# Patient Record
Sex: FEMALE | Race: WHITE | Employment: FULL TIME | URBAN - METROPOLITAN AREA
[De-identification: names, ages, dates, MRNs, and addresses within clinical notes are randomized per-mention and may not be internally consistent; named-entity substitution may affect disease eponyms.]

---

## 2017-04-04 ENCOUNTER — EMPLOYEE WELLNESS (OUTPATIENT)
Dept: OTHER | Age: 52
End: 2017-04-04

## 2017-04-04 LAB
CHOLESTEROL, TOTAL: 145 MG/DL (ref 0–199)
GLUCOSE BLD-MCNC: 78 MG/DL (ref 70–99)
HDLC SERPL-MCNC: 26 MG/DL (ref 40–60)
LDL CHOLESTEROL CALCULATED: 93 MG/DL
TRIGL SERPL-MCNC: 131 MG/DL (ref 0–150)

## 2017-04-13 ENCOUNTER — OFFICE VISIT (OUTPATIENT)
Dept: DERMATOLOGY | Age: 52
End: 2017-04-13

## 2017-04-13 DIAGNOSIS — R20.9 DISTURBANCE OF SKIN SENSATION: ICD-10-CM

## 2017-04-13 DIAGNOSIS — D18.01 CHERRY ANGIOMA: ICD-10-CM

## 2017-04-13 DIAGNOSIS — L91.8 SKIN TAG: Primary | ICD-10-CM

## 2017-04-13 PROCEDURE — 99213 OFFICE O/P EST LOW 20 MIN: CPT | Performed by: DERMATOLOGY

## 2017-04-13 PROCEDURE — 11200 RMVL SKIN TAGS UP TO&INC 15: CPT | Performed by: DERMATOLOGY

## 2017-06-01 ENCOUNTER — PROCEDURE VISIT (OUTPATIENT)
Dept: DERMATOLOGY | Age: 52
End: 2017-06-01

## 2017-06-01 DIAGNOSIS — Z41.1 ELECTIVE PROCEDURE FOR UNACCEPTABLE COSMETIC APPEARANCE: ICD-10-CM

## 2017-06-01 DIAGNOSIS — D18.01 CHERRY ANGIOMA: Primary | ICD-10-CM

## 2017-06-01 PROCEDURE — DM01335 VBEAM LASER ANGIOMAS 1-5: Performed by: DERMATOLOGY

## 2017-07-07 ENCOUNTER — HOSPITAL ENCOUNTER (OUTPATIENT)
Dept: MAMMOGRAPHY | Age: 52
Discharge: HOME | End: 2017-07-07
Admitting: OBSTETRICS & GYNECOLOGY

## 2017-07-07 DIAGNOSIS — N63.10 BREAST MASS, RIGHT: ICD-10-CM

## 2017-07-07 DIAGNOSIS — N63.0 LUMP OR MASS IN BREAST: ICD-10-CM

## 2017-11-16 VITALS
HEIGHT: 66 IN | RESPIRATION RATE: 16 BRPM | OXYGEN SATURATION: 100 % | DIASTOLIC BLOOD PRESSURE: 66 MMHG | SYSTOLIC BLOOD PRESSURE: 111 MMHG | HEART RATE: 71 BPM | BODY MASS INDEX: 28.12 KG/M2 | TEMPERATURE: 97.9 F | WEIGHT: 175 LBS

## 2017-11-16 NOTE — ANESTHESIA PRE-OP
Department of Anesthesiology  Preprocedure Note       Name:  Lionel Lozano   Age:  50 y.o.  :  1965                                          MRN:  [id number]         Date:  2017      Surgeon: Greg Munroe    Procedure: colonoscopy    Medications prior to admission:   Prior to Admission medications    Medication Sig Start Date End Date Taking? Authorizing Provider   norethindrone-ethinyl estradiol-Fe (LO LOESTRIN FE) 1 MG-10 MCG / 10 MCG tablet Take 1 tablet by mouth daily   Yes Historical Provider, MD   fluticasone (VERAMYST) 27.5 MCG/SPRAY nasal spray 2 sprays by Nasal route daily   Yes Historical Provider, MD   Fexofenadine HCl (ALLEGRA PO) Take  by mouth daily. Yes Historical Provider, MD       Current medications:    Current Outpatient Prescriptions   Medication Sig Dispense Refill    norethindrone-ethinyl estradiol-Fe (LO LOESTRIN FE) 1 MG-10 MCG / 10 MCG tablet Take 1 tablet by mouth daily      fluticasone (VERAMYST) 27.5 MCG/SPRAY nasal spray 2 sprays by Nasal route daily      Fexofenadine HCl (ALLEGRA PO) Take  by mouth daily. No current facility-administered medications for this encounter. Allergies: Allergies   Allergen Reactions    Dye [Iodides]        Problem List:  There is no problem list on file for this patient. Past Medical History:        Diagnosis Date    Difficult intubation     states she's been told twice this.     Endometriosis     Fibroid tumor     Nausea & vomiting     TMJ disease        Past Surgical History:        Procedure Laterality Date     SECTION      CHOLECYSTECTOMY  2013    laparoscopic    DILATION AND CURETTAGE OF UTERUS         Social History:    Social History   Substance Use Topics    Smoking status: Never Smoker    Smokeless tobacco: Not on file    Alcohol use No                                Counseling given: Not Answered      Vital Signs (Current):   Vitals:    17 1030   BP: 111/66   Pulse: 71   Resp: 16   Temp:

## 2017-11-16 NOTE — OP NOTE
descending colon was removed  Recommendations  Patient was instructed to call for biopsy results in 1 week and follow up appropriately. The patient is recommended to have a follow-up colonoscopy in 3 years. Thank you and please let me know if there are any additional questions or concerns.     Leslye Hogan

## 2017-11-16 NOTE — PLAN OF CARE
ADMISSION PRE-PROCEDURE INTRA-PROCEDURE POST-PROCEDURE: RECOVERY/ DISCHARGE   ASSESSMENT &  EVALUATION CONSULTS [x] Verify patient identification, allergies, vital signs, NPO, IV, & SPO2  [x] Complete the AUGUSTUS SCORE  [x] Consent form to treat signed  [x] History and Physical [x] Reassess patient after pre- procedure medication given  [x] GI physician evaluates pt  [x] Verify patient's name, allergies [x] Continuous monitoring of vital  signs, SPO2, LOC  [x] Emotional status  [x] Patient comfort level [x] Total system admission assessment with appropriate intervention  [x] Pain evaluation and management  [x] Discharge criteria met  [x] Discharge assessment with appropriate intervention  [x] Compare with pre-procedure status  [x] Discharge by appropriate physician   DIAGNOSTIC / TESTS [x]  Lab work ordered  [x]  Obtain and attach lab work to patient's chart  [x]  Report abnormals and F/U with physician [x] Assure needed test results are present [x] Diagnostic testing as indicated  [x] Obtained specimens sent to lab [x] Diagnostic testing as indicated     MEDICATIONS [x]  Conscious sedation medications  explained to patient  [x]  Start IV per physician's order  and/or protocol  [x]  Verify compliance of the colon prep  []  Pre-procedure med. as ordered  [x] Verify compliance of colon prep. [x] Re-check IV access [x] Assist with administration of IV conscious sedation medication  [x] Start O2  per  nasal cannula, if needed   [x] IV fluids as indicated/ordered  [x] Administration of medications as ordered  [x] Medications as prescribed  [x] D/C O2 therapy as ordered   PROCEDURE/TREATMENT [x] Specific order by GI physician  [x] Specific procedure as scheduled  [x]  Verify procedure as ordered [x] Time out/procedure verification checklist complete.  [x] EGD/Colonoscopy and related procedures  [x] Assist physician with the procedure [x] Treatment as indicated   NUTRITION / DIET [x] NPO after midnight, as ordered [x] Verify NPO status [x] IV fluids as support [x] Clear liquids and/or ice chips as ordered  [x] Tolerating clear liquids  [x] Special diet as ordered  [x] D/C IV fluids   ACTIVITY  [x] Assess level of function  [x]  Specified by physician  [x]  Activity as tolerated [x] Position on left side [x] Position on left side and reposition patient as physician ordered [x] Gradually elevate HOB to omers position  [x] Position changes as patient tolerated  [x] Ambulate as pre-procedure   PATIENT / S.O. EDUCATION [x] Pre, Intra Post-procedure  teaching appropriate to procedure  [x] Conscious Sedation Teaching  [x] Pain Management - instructed [x] Encourage questions  [x] Clarify any concerns [x] Safety devices maintain to  prevent patient injury  [x] Assist and support patient  [x] Observe standard precautions [x] Physician confers with the family/S.O. [x] Short visit from family in [place] area  [x] Physician specific post-procedure orders  [x] S/S complications with proper [x]F/U; office visits F/U  [x] Review discharge instructions, medicine to family /S. O. [x] Medication/ special diet information given to family/ S.O. [x]  Copy of discharge instructions givn to family/S.O.   OUTCOME PLANNING  DISCHARGE PLAN [x] Patient/S. O. will verbalize understanding of admission procedure & expectations of outcome in realistic terms   [x] Patient verbalize the role of family/S. O. in plan of care  [x] Patient will have designated responsible person available for discharge.  [x] Patient will demonstrate an  understanding of the planned  procedure and its related procedures and conscious sedation [x] Patient will:  - receive services according to the           standards of care  - receive standards for conscious       sedation  - remain free of injury [x] Patient will:  - have stable vital signs based on Rosie Score   - be pain free or tolerable, have no bleeding  - have minimal abdominal distention   -  return to pre-procedure level of orientation   -  tolerate fluid with no nausea and vomiting  -  ambulate as pre-procedure ADL   - verbalize understanding of the discharge instructions     [address]

## 2017-11-16 NOTE — ANESTHESIA POST-OP
Anesthesia Post-op Note    Patient: Syd Thompson  MRN: [id number]  YOB: 1965  Date of evaluation: 11/16/2017  Time:  12:27 PM     Procedure(s) Performed:     Last Vitals: /66   Pulse 71   Temp 97.9 °F (36.6 °C)   Resp 16   Ht 5' 6\" (1.676 m)   Wt 175 lb (79.4 kg)   SpO2 100%   BMI 28.25 kg/m²     Rosie Phase I:      Rosie Phase II:      Anesthesia Post Evaluation    Final anesthesia type: MAC  Patient location during evaluation: PACU  Patient participation: complete - patient participated  Level of consciousness: awake and alert  Pain score: 0  Airway patency: patent  Nausea & Vomiting: no nausea and no vomiting  Complications: no  Cardiovascular status: blood pressure returned to baseline  Respiratory status: acceptable  Hydration status: euvolemic        Cielo Carballo MD  12:27 PM

## 2017-11-30 ENCOUNTER — HOSPITAL ENCOUNTER (OUTPATIENT)
Dept: MAMMOGRAPHY | Age: 52
Discharge: HOME | End: 2017-11-30
Attending: OBSTETRICS & GYNECOLOGY | Admitting: OBSTETRICS & GYNECOLOGY

## 2017-11-30 DIAGNOSIS — Z12.31 ENCOUNTER FOR SCREENING MAMMOGRAM FOR BREAST CANCER: ICD-10-CM

## 2017-12-19 ENCOUNTER — HOSPITAL ENCOUNTER (OUTPATIENT)
Dept: ENDOSCOPY | Age: 52
Discharge: HOME | End: 2017-11-16

## 2018-02-19 ENCOUNTER — HOSPITAL ENCOUNTER (OUTPATIENT)
Dept: OTHER | Age: 53
Discharge: HOME | End: 2018-02-19
Attending: OBSTETRICS & GYNECOLOGY | Admitting: OBSTETRICS & GYNECOLOGY

## 2018-02-19 LAB
BASOPHILS ABSOLUTE: 0.1 K/UL (ref 0–0.2)
BASOPHILS RELATIVE PERCENT: 0.7 %
EOSINOPHILS ABSOLUTE: 0.2 K/UL (ref 0–0.6)
EOSINOPHILS RELATIVE PERCENT: 2.8 %
FOLLICLE STIMULATING HORMONE: 3.5 MIU/ML
HCT VFR BLD CALC: 36.7 % (ref 36–48)
HEMOGLOBIN: 12.6 G/DL (ref 12–16)
LYMPHOCYTES ABSOLUTE: 2.1 K/UL (ref 1–5.1)
LYMPHOCYTES RELATIVE PERCENT: 26.2 %
MCH RBC QN AUTO: 31.6 PG (ref 26–34)
MCHC RBC AUTO-ENTMCNC: 34.4 G/DL (ref 31–36)
MCV RBC AUTO: 91.7 FL (ref 80–100)
MONOCYTES ABSOLUTE: 0.6 K/UL (ref 0–1.3)
MONOCYTES RELATIVE PERCENT: 7.2 %
NEUTROPHILS ABSOLUTE: 5 K/UL (ref 1.7–7.7)
NEUTROPHILS RELATIVE PERCENT: 63.1 %
PDW BLD-RTO: 13.9 % (ref 12.4–15.4)
PLATELET # BLD: 213 K/UL (ref 135–450)
PMV BLD AUTO: 10.5 FL (ref 5–10.5)
RBC # BLD: 4 M/UL (ref 4–5.2)
WBC # BLD: 8 K/UL (ref 4–11)

## 2018-02-23 ENCOUNTER — NURSE TRIAGE (OUTPATIENT)
Dept: OTHER | Facility: CLINIC | Age: 53
End: 2018-02-23

## 2018-02-23 PROBLEM — N93.8 DUB (DYSFUNCTIONAL UTERINE BLEEDING): Status: ACTIVE | Noted: 2018-02-23

## 2018-03-01 ENCOUNTER — HOSPITAL ENCOUNTER (OUTPATIENT)
Dept: MRI IMAGING | Age: 53
Discharge: HOME | End: 2018-03-01
Attending: OBSTETRICS & GYNECOLOGY | Admitting: OBSTETRICS & GYNECOLOGY

## 2018-03-01 ENCOUNTER — PAT TELEPHONE (OUTPATIENT)
Dept: PREADMISSION TESTING | Age: 53
End: 2018-03-01

## 2018-03-01 VITALS — HEIGHT: 66 IN | WEIGHT: 175 LBS | BODY MASS INDEX: 28.12 KG/M2

## 2018-03-01 DIAGNOSIS — N93.8 UTERINE BLEEDING, DYSFUNCTIONAL: ICD-10-CM

## 2018-03-01 DIAGNOSIS — D25.9 UTERINE LEIOMYOMA, UNSPECIFIED LOCATION: ICD-10-CM

## 2018-03-01 DIAGNOSIS — N39.0 URINARY TRACT INFECTION: ICD-10-CM

## 2018-03-05 PROBLEM — D25.9 FIBROID UTERUS: Status: ACTIVE | Noted: 2018-03-05

## 2018-03-06 PROBLEM — G89.18 POST-OP PAIN: Status: ACTIVE | Noted: 2018-03-06

## 2018-03-20 VITALS — WEIGHT: 174 LBS | BODY MASS INDEX: 27.25 KG/M2

## 2018-04-03 ENCOUNTER — EMPLOYEE WELLNESS (OUTPATIENT)
Dept: OTHER | Age: 53
End: 2018-04-03

## 2018-04-03 LAB
CHOLESTEROL, TOTAL: 191 MG/DL (ref 0–199)
GLUCOSE BLD-MCNC: 89 MG/DL (ref 70–99)
HDLC SERPL-MCNC: 36 MG/DL (ref 40–60)
LDL CHOLESTEROL CALCULATED: 127 MG/DL
TRIGL SERPL-MCNC: 141 MG/DL (ref 0–150)

## 2018-04-10 VITALS — BODY MASS INDEX: 27.92 KG/M2 | WEIGHT: 173 LBS

## 2018-08-28 ENCOUNTER — NURSE TRIAGE (OUTPATIENT)
Dept: OTHER | Facility: CLINIC | Age: 53
End: 2018-08-28

## 2018-08-28 ENCOUNTER — HOSPITAL ENCOUNTER (EMERGENCY)
Age: 53
Discharge: HOME | End: 2018-08-28
Attending: EMERGENCY MEDICINE
Payer: COMMERCIAL

## 2018-08-28 VITALS
BODY MASS INDEX: 28.12 KG/M2 | OXYGEN SATURATION: 99 % | SYSTOLIC BLOOD PRESSURE: 106 MMHG | WEIGHT: 175 LBS | HEART RATE: 62 BPM | TEMPERATURE: 98 F | DIASTOLIC BLOOD PRESSURE: 67 MMHG | HEIGHT: 66 IN | RESPIRATION RATE: 16 BRPM

## 2018-08-28 DIAGNOSIS — R42 VERTIGO: Primary | ICD-10-CM

## 2018-08-28 LAB
A/G RATIO: 1.5 (ref 1.1–2.2)
ALBUMIN SERPL-MCNC: 4.4 G/DL (ref 3.4–5)
ALP BLD-CCNC: 90 U/L (ref 40–129)
ALT SERPL-CCNC: 15 U/L (ref 10–40)
ANION GAP SERPL CALCULATED.3IONS-SCNC: 11 MMOL/L (ref 3–16)
AST SERPL-CCNC: 14 U/L (ref 15–37)
BASOPHILS ABSOLUTE: 0 K/UL (ref 0–0.2)
BASOPHILS RELATIVE PERCENT: 0.6 %
BILIRUB SERPL-MCNC: 0.4 MG/DL (ref 0–1)
BUN BLDV-MCNC: 15 MG/DL (ref 7–20)
CALCIUM SERPL-MCNC: 10.3 MG/DL (ref 8.3–10.6)
CHLORIDE BLD-SCNC: 107 MMOL/L (ref 99–110)
CO2: 26 MMOL/L (ref 21–32)
CREAT SERPL-MCNC: 0.7 MG/DL (ref 0.6–1.1)
EOSINOPHILS ABSOLUTE: 0.1 K/UL (ref 0–0.6)
EOSINOPHILS RELATIVE PERCENT: 2.3 %
GFR AFRICAN AMERICAN: >60
GFR NON-AFRICAN AMERICAN: >60
GLOBULIN: 2.9 G/DL
GLUCOSE BLD-MCNC: 102 MG/DL (ref 70–99)
HCT VFR BLD CALC: 41.5 % (ref 36–48)
HEMOGLOBIN: 13.6 G/DL (ref 12–16)
LYMPHOCYTES ABSOLUTE: 1.2 K/UL (ref 1–5.1)
LYMPHOCYTES RELATIVE PERCENT: 20.4 %
MCH RBC QN AUTO: 29.3 PG (ref 26–34)
MCHC RBC AUTO-ENTMCNC: 32.7 G/DL (ref 31–36)
MCV RBC AUTO: 89.7 FL (ref 80–100)
MONOCYTES ABSOLUTE: 0.4 K/UL (ref 0–1.3)
MONOCYTES RELATIVE PERCENT: 6.9 %
NEUTROPHILS ABSOLUTE: 4.2 K/UL (ref 1.7–7.7)
NEUTROPHILS RELATIVE PERCENT: 69.8 %
PDW BLD-RTO: 14.8 % (ref 12.4–15.4)
PLATELET # BLD: 206 K/UL (ref 135–450)
PMV BLD AUTO: 9.8 FL (ref 5–10.5)
POTASSIUM SERPL-SCNC: 4.7 MMOL/L (ref 3.5–5.1)
RBC # BLD: 4.63 M/UL (ref 4–5.2)
SODIUM BLD-SCNC: 144 MMOL/L (ref 136–145)
TOTAL PROTEIN: 7.3 G/DL (ref 6.4–8.2)
WBC # BLD: 5.9 K/UL (ref 4–11)

## 2018-08-28 PROCEDURE — 6370000000 HC RX 637 (ALT 250 FOR IP): Performed by: EMERGENCY MEDICINE

## 2018-08-28 PROCEDURE — 99284 EMERGENCY DEPT VISIT MOD MDM: CPT

## 2018-08-28 PROCEDURE — 96374 THER/PROPH/DIAG INJ IV PUSH: CPT

## 2018-08-28 PROCEDURE — 2580000003 HC RX 258: Performed by: EMERGENCY MEDICINE

## 2018-08-28 PROCEDURE — 96361 HYDRATE IV INFUSION ADD-ON: CPT

## 2018-08-28 PROCEDURE — 85025 COMPLETE CBC W/AUTO DIFF WBC: CPT

## 2018-08-28 PROCEDURE — 80053 COMPREHEN METABOLIC PANEL: CPT

## 2018-08-28 PROCEDURE — 36415 COLL VENOUS BLD VENIPUNCTURE: CPT

## 2018-08-28 PROCEDURE — 6360000002 HC RX W HCPCS: Performed by: EMERGENCY MEDICINE

## 2018-08-28 RX ORDER — MECLIZINE HYDROCHLORIDE 25 MG/1
25 TABLET ORAL 3 TIMES DAILY PRN
Qty: 20 TABLET | Refills: 0 | Status: SHIPPED | OUTPATIENT
Start: 2018-08-28 | End: 2020-10-13

## 2018-08-28 RX ORDER — ONDANSETRON 4 MG/1
4 TABLET, ORALLY DISINTEGRATING ORAL EVERY 8 HOURS PRN
Qty: 20 TABLET | Refills: 0 | Status: SHIPPED | OUTPATIENT
Start: 2018-08-28 | End: 2019-05-01 | Stop reason: ALTCHOICE

## 2018-08-28 RX ORDER — 0.9 % SODIUM CHLORIDE 0.9 %
1000 INTRAVENOUS SOLUTION INTRAVENOUS ONCE
Status: COMPLETED | OUTPATIENT
Start: 2018-08-28 | End: 2018-08-28

## 2018-08-28 RX ORDER — ONDANSETRON 2 MG/ML
4 INJECTION INTRAMUSCULAR; INTRAVENOUS
Status: DISCONTINUED | OUTPATIENT
Start: 2018-08-28 | End: 2018-08-28 | Stop reason: HOSPADM

## 2018-08-28 RX ORDER — MECLIZINE HYDROCHLORIDE 25 MG/1
25 TABLET ORAL ONCE
Status: COMPLETED | OUTPATIENT
Start: 2018-08-28 | End: 2018-08-28

## 2018-08-28 RX ADMIN — ONDANSETRON HYDROCHLORIDE 4 MG: 2 INJECTION, SOLUTION INTRAMUSCULAR; INTRAVENOUS at 08:19

## 2018-08-28 RX ADMIN — SODIUM CHLORIDE 1000 ML: 9 INJECTION, SOLUTION INTRAVENOUS at 08:19

## 2018-08-28 RX ADMIN — MECLIZINE HYDROCHLORIDE 25 MG: 25 TABLET ORAL at 08:19

## 2018-08-28 NOTE — LETTER
[facility] Emergency Department  [place] [address] [address]  Phone: [phone]               August 28, 2018    Patient: Stefanie Desai   YOB: 1965   Date of Visit: 8/28/2018       To Whom It May Concern:    Stefanie Desai was seen and treated in our emergency department on 8/28/2018. She was advised to avoid flying if she is still experiencing symptoms due to her medical condition diagnosed today.       Sincerely,       Tegan Good MD         Signature:__________________________________

## 2018-08-28 NOTE — TELEPHONE ENCOUNTER
Reason for Disposition   SEVERE dizziness (e.g., unable to stand, requires support to walk, feels like passing out now)    Protocols used: DIZZINESS-ADULT-OH    Caller states this morning she has severe dizziness and states the room was spinning including her blinds in her room were moving. Caller states she has nausea and has vomited with this and currently has severe dizziness where she is afraid to stand. Caller denies diabetes or any other chronic illness and has been eating, drinking and urinating normally. Caller states she called her mother who will take her to the [place] ER.

## 2018-12-04 ENCOUNTER — HOSPITAL ENCOUNTER (OUTPATIENT)
Dept: WOMENS IMAGING | Age: 53
Discharge: HOME | End: 2018-12-04
Payer: COMMERCIAL

## 2018-12-04 DIAGNOSIS — Z12.39 BREAST CANCER SCREENING: ICD-10-CM

## 2018-12-04 PROCEDURE — 77067 SCR MAMMO BI INCL CAD: CPT

## 2019-05-01 ENCOUNTER — OFFICE VISIT (OUTPATIENT)
Dept: FAMILY MEDICINE CLINIC | Age: 54
End: 2019-05-01
Payer: COMMERCIAL

## 2019-05-01 VITALS
OXYGEN SATURATION: 96 % | HEART RATE: 83 BPM | HEIGHT: 66 IN | SYSTOLIC BLOOD PRESSURE: 112 MMHG | TEMPERATURE: 98.1 F | DIASTOLIC BLOOD PRESSURE: 72 MMHG | WEIGHT: 190.4 LBS | BODY MASS INDEX: 30.6 KG/M2 | RESPIRATION RATE: 16 BRPM

## 2019-05-01 DIAGNOSIS — Z00.00 ANNUAL PHYSICAL EXAM: Primary | ICD-10-CM

## 2019-05-01 DIAGNOSIS — M62.89 TIGHTNESS OF LEG FASCIA: ICD-10-CM

## 2019-05-01 DIAGNOSIS — M25.511 CHRONIC RIGHT SHOULDER PAIN: ICD-10-CM

## 2019-05-01 DIAGNOSIS — G89.29 CHRONIC RIGHT SHOULDER PAIN: ICD-10-CM

## 2019-05-01 DIAGNOSIS — R63.5 UNEXPLAINED WEIGHT GAIN: Primary | ICD-10-CM

## 2019-05-01 PROCEDURE — 99204 OFFICE O/P NEW MOD 45 MIN: CPT | Performed by: FAMILY MEDICINE

## 2019-05-01 ASSESSMENT — PATIENT HEALTH QUESTIONNAIRE - PHQ9
SUM OF ALL RESPONSES TO PHQ9 QUESTIONS 1 & 2: 0
SUM OF ALL RESPONSES TO PHQ QUESTIONS 1-9: 0
1. LITTLE INTEREST OR PLEASURE IN DOING THINGS: 0
SUM OF ALL RESPONSES TO PHQ QUESTIONS 1-9: 0
2. FEELING DOWN, DEPRESSED OR HOPELESS: 0

## 2019-05-01 NOTE — PROGRESS NOTES
resource strain: Not on file    Food insecurity:     Worry: Not on file     Inability: Not on file    Transportation needs:     Medical: Not on file     Non-medical: Not on file   Tobacco Use    Smoking status: Never Smoker    Smokeless tobacco: Never Used   Substance and Sexual Activity    Alcohol use: No    Drug use: No    Sexual activity: Yes     Partners: Male   Lifestyle    Physical activity:     Days per week: Not on file     Minutes per session: Not on file    Stress: Not on file   Relationships    Social connections:     Talks on phone: Not on file     Gets together: Not on file     Attends Hindu service: Not on file     Active member of club or organization: Not on file     Attends meetings of clubs or organizations: Not on file     Relationship status: Not on file    Intimate partner violence:     Fear of current or ex partner: Not on file     Emotionally abused: Not on file     Physically abused: Not on file     Forced sexual activity: Not on file   Other Topics Concern    Not on file   Social History Narrative    Not on file       Family History   Problem Relation Age of Onset    High Cholesterol Mother     Diabetes Father     Kidney Disease Father     High Cholesterol Father        Current Outpatient Medications   Medication Sig Dispense Refill    fexofenadine (ALLEGRA ALLERGY) 180 MG tablet Take 180 mg by mouth daily      fluticasone (FLONASE) 50 MCG/ACT nasal spray 1 spray by Nasal route as needed for Rhinitis      ibuprofen (ADVIL;MOTRIN) 600 MG tablet Take 1 tablet by mouth every 6 hours as needed for Pain 30 tablet 1    meclizine (ANTIVERT) 25 MG tablet Take 1 tablet by mouth 3 times daily as needed for Dizziness 20 tablet 0    docusate sodium (COLACE) 100 MG capsule Take 1 capsule by mouth 2 times daily 60 capsule 1     No current facility-administered medications for this visit.         Immunization History   Administered Date(s) Administered    Influenza Vaccine, unspecified formulation 10/01/2017       Allergies   Allergen Reactions    Dye [Iodides] Other (See Comments)     IVP dye for cystoscopy - 30 years ago approximately -\"instant head congestion\"       Admission on 08/28/2018, Discharged on 08/28/2018   Component Date Value Ref Range Status    WBC 08/28/2018 5.9  4.0 - 11.0 K/uL Final    RBC 08/28/2018 4.63  4.00 - 5.20 M/uL Final    Hemoglobin 08/28/2018 13.6  12.0 - 16.0 g/dL Final    Hematocrit 08/28/2018 41.5  36.0 - 48.0 % Final    MCV 08/28/2018 89.7  80.0 - 100.0 fL Final    MCH 08/28/2018 29.3  26.0 - 34.0 pg Final    MCHC 08/28/2018 32.7  31.0 - 36.0 g/dL Final    RDW 08/28/2018 14.8  12.4 - 15.4 % Final    Platelets 99/61/9479 206  135 - 450 K/uL Final    MPV 08/28/2018 9.8  5.0 - 10.5 fL Final    Neutrophils % 08/28/2018 69.8  % Final    Lymphocytes % 08/28/2018 20.4  % Final    Monocytes % 08/28/2018 6.9  % Final    Eosinophils % 08/28/2018 2.3  % Final    Basophils % 08/28/2018 0.6  % Final    Neutrophils # 08/28/2018 4.2  1.7 - 7.7 K/uL Final    Lymphocytes # 08/28/2018 1.2  1.0 - 5.1 K/uL Final    Monocytes # 08/28/2018 0.4  0.0 - 1.3 K/uL Final    Eosinophils # 08/28/2018 0.1  0.0 - 0.6 K/uL Final    Basophils # 08/28/2018 0.0  0.0 - 0.2 K/uL Final    Sodium 08/28/2018 144  136 - 145 mmol/L Final    Potassium 08/28/2018 4.7  3.5 - 5.1 mmol/L Final    Chloride 08/28/2018 107  99 - 110 mmol/L Final    CO2 08/28/2018 26  21 - 32 mmol/L Final    Anion Gap 08/28/2018 11  3 - 16 Final    Glucose 08/28/2018 102* 70 - 99 mg/dL Final    BUN 08/28/2018 15  7 - 20 mg/dL Final    CREATININE 08/28/2018 0.7  0.6 - 1.1 mg/dL Final    GFR Non- 08/28/2018 >60  >60 Final    Comment: >60 mL/min/1.73m2 EGFR, calc. for ages 25 and older using the  MDRD formula (not corrected for weight), is valid for stable  renal function.       GFR  08/28/2018 >60  >60 Final    Comment: Chronic Kidney Disease: less than 60 ml/min/1.73 sq.m. Kidney Failure: less than 15 ml/min/1.73 sq.m. Results valid for patients 18 years and older.  Calcium 08/28/2018 10.3  8.3 - 10.6 mg/dL Final    Total Protein 08/28/2018 7.3  6.4 - 8.2 g/dL Final    Alb 08/28/2018 4.4  3.4 - 5.0 g/dL Final    Albumin/Globulin Ratio 08/28/2018 1.5  1.1 - 2.2 Final    Total Bilirubin 08/28/2018 0.4  0.0 - 1.0 mg/dL Final    Alkaline Phosphatase 08/28/2018 90  40 - 129 U/L Final    ALT 08/28/2018 15  10 - 40 U/L Final    AST 08/28/2018 14* 15 - 37 U/L Final    Globulin 08/28/2018 2.9  g/dL Final       Review of Systems   Constitutional: Positive for unexpected weight change. Genitourinary: Negative for vaginal bleeding and vaginal pain. Musculoskeletal: Positive for arthralgias (with pressure). Reduced right UE ROM       /72 (Site: Left Upper Arm, Position: Sitting, Cuff Size: Medium Adult)   Pulse 83   Temp 98.1 °F (36.7 °C) (Oral)   Resp 16   Ht 5' 6\" (1.676 m)   Wt 190 lb 6.4 oz (86.4 kg)   SpO2 96%   Breastfeeding? No   BMI 30.73 kg/m²     Physical Exam   Constitutional: She is oriented to person, place, and time. She appears well-developed and well-nourished. HENT:   Head: Normocephalic and atraumatic. Eyes: Pupils are equal, round, and reactive to light. EOM are normal.   Neck: Normal range of motion. Cardiovascular: Normal rate, regular rhythm and normal heart sounds. Pulmonary/Chest: Effort normal and breath sounds normal.   Abdominal: Bowel sounds are normal. There is no tenderness. Musculoskeletal: She exhibits tenderness (right forearm) and deformity (left lateral calf). She exhibits no edema. Decreased right UE ROM   Neurological: She is alert and oriented to person, place, and time. She displays normal reflexes. Psychiatric: She has a normal mood and affect. Her behavior is normal. Judgment and thought content normal.   Vitals reviewed. Plan   Diagnosis Orders   1.  Unexplained weight gain 2. Tightness of leg fascia     3. Chronic right shoulder pain  Jalen Aden MD, Orthopedic Surgery, [facility]       Return in about 2 months (around 7/1/2019) for Physical Exam.    Prior to Visit Medications    Medication Sig Taking?  Authorizing Provider   fexofenadine (ALLEGRA ALLERGY) 180 MG tablet Take 180 mg by mouth daily Yes Historical Provider, MD   fluticasone (FLONASE) 50 MCG/ACT nasal spray 1 spray by Nasal route as needed for Rhinitis Yes Historical Provider, MD   ibuprofen (ADVIL;MOTRIN) 600 MG tablet Take 1 tablet by mouth every 6 hours as needed for Pain Yes Inocencio Marlow DO   meclizine (ANTIVERT) 25 MG tablet Take 1 tablet by mouth 3 times daily as needed for Dizziness  Karen Cardoza MD   docusate sodium (COLACE) 100 MG capsule Take 1 capsule by mouth 2 times daily  Inocencio Marlow DO

## 2019-05-03 DIAGNOSIS — Z00.00 ANNUAL PHYSICAL EXAM: ICD-10-CM

## 2019-05-03 LAB
A/G RATIO: 1.8 (ref 1.1–2.2)
ALBUMIN SERPL-MCNC: 4.4 G/DL (ref 3.4–5)
ALP BLD-CCNC: 78 U/L (ref 40–129)
ALT SERPL-CCNC: 13 U/L (ref 10–40)
ANION GAP SERPL CALCULATED.3IONS-SCNC: 10 MMOL/L (ref 3–16)
AST SERPL-CCNC: 12 U/L (ref 15–37)
BASOPHILS ABSOLUTE: 0.1 K/UL (ref 0–0.2)
BASOPHILS RELATIVE PERCENT: 0.9 %
BILIRUB SERPL-MCNC: 0.6 MG/DL (ref 0–1)
BUN BLDV-MCNC: 13 MG/DL (ref 7–20)
CALCIUM SERPL-MCNC: 9.9 MG/DL (ref 8.3–10.6)
CHLORIDE BLD-SCNC: 107 MMOL/L (ref 99–110)
CHOLESTEROL, TOTAL: 181 MG/DL (ref 0–199)
CO2: 26 MMOL/L (ref 21–32)
CREAT SERPL-MCNC: 0.6 MG/DL (ref 0.6–1.1)
EOSINOPHILS ABSOLUTE: 0.2 K/UL (ref 0–0.6)
EOSINOPHILS RELATIVE PERCENT: 3.9 %
GFR AFRICAN AMERICAN: >60
GFR NON-AFRICAN AMERICAN: >60
GLOBULIN: 2.5 G/DL
GLUCOSE BLD-MCNC: 89 MG/DL (ref 70–99)
HCT VFR BLD CALC: 41.8 % (ref 36–48)
HDLC SERPL-MCNC: 42 MG/DL (ref 40–60)
HEMOGLOBIN: 13.5 G/DL (ref 12–16)
LDL CHOLESTEROL CALCULATED: 122 MG/DL
LYMPHOCYTES ABSOLUTE: 1.5 K/UL (ref 1–5.1)
LYMPHOCYTES RELATIVE PERCENT: 27.7 %
MAGNESIUM: 2.3 MG/DL (ref 1.8–2.4)
MCH RBC QN AUTO: 30.1 PG (ref 26–34)
MCHC RBC AUTO-ENTMCNC: 32.2 G/DL (ref 31–36)
MCV RBC AUTO: 93.5 FL (ref 80–100)
MONOCYTES ABSOLUTE: 0.4 K/UL (ref 0–1.3)
MONOCYTES RELATIVE PERCENT: 8.3 %
NEUTROPHILS ABSOLUTE: 3.2 K/UL (ref 1.7–7.7)
NEUTROPHILS RELATIVE PERCENT: 59.2 %
PDW BLD-RTO: 13.8 % (ref 12.4–15.4)
PLATELET # BLD: 202 K/UL (ref 135–450)
PMV BLD AUTO: 10.4 FL (ref 5–10.5)
POTASSIUM SERPL-SCNC: 4.5 MMOL/L (ref 3.5–5.1)
RBC # BLD: 4.47 M/UL (ref 4–5.2)
SODIUM BLD-SCNC: 143 MMOL/L (ref 136–145)
T3 FREE: 3.1 PG/ML (ref 2.3–4.2)
T4 FREE: 1.1 NG/DL (ref 0.9–1.8)
TOTAL PROTEIN: 6.9 G/DL (ref 6.4–8.2)
TRIGL SERPL-MCNC: 86 MG/DL (ref 0–150)
TSH SERPL DL<=0.05 MIU/L-ACNC: 2.36 UIU/ML (ref 0.27–4.2)
VITAMIN D 25-HYDROXY: 21.9 NG/ML
VLDLC SERPL CALC-MCNC: 17 MG/DL
WBC # BLD: 5.3 K/UL (ref 4–11)

## 2019-05-06 ENCOUNTER — OFFICE VISIT (OUTPATIENT)
Dept: ORTHOPEDIC SURGERY | Age: 54
End: 2019-05-06
Payer: COMMERCIAL

## 2019-05-06 VITALS
HEIGHT: 66 IN | SYSTOLIC BLOOD PRESSURE: 121 MMHG | HEART RATE: 59 BPM | BODY MASS INDEX: 29.73 KG/M2 | WEIGHT: 185 LBS | DIASTOLIC BLOOD PRESSURE: 77 MMHG

## 2019-05-06 DIAGNOSIS — M25.511 RIGHT SHOULDER PAIN, UNSPECIFIED CHRONICITY: ICD-10-CM

## 2019-05-06 DIAGNOSIS — M75.21 BICEPS TENDONITIS ON RIGHT: Primary | ICD-10-CM

## 2019-05-06 DIAGNOSIS — M25.611 SHOULDER STIFFNESS, RIGHT: ICD-10-CM

## 2019-05-06 PROCEDURE — 99203 OFFICE O/P NEW LOW 30 MIN: CPT | Performed by: PHYSICIAN ASSISTANT

## 2019-05-06 PROCEDURE — 73030 X-RAY EXAM OF SHOULDER: CPT | Performed by: PHYSICIAN ASSISTANT

## 2019-05-06 PROCEDURE — 20610 DRAIN/INJ JOINT/BURSA W/O US: CPT | Performed by: PHYSICIAN ASSISTANT

## 2019-05-06 RX ORDER — MELOXICAM 15 MG/1
15 TABLET ORAL DAILY
Qty: 30 TABLET | Refills: 3 | Status: ON HOLD | OUTPATIENT
Start: 2019-05-06 | End: 2020-12-23

## 2019-05-06 RX ORDER — ESTRADIOL 1 MG/1
0.5 TABLET ORAL DAILY
COMMUNITY

## 2019-05-06 ASSESSMENT — ENCOUNTER SYMPTOMS: BACK PAIN: 1

## 2019-05-06 NOTE — PROGRESS NOTES
Depo-Medrol:  NDC: P7733084  Lot #: [id number]  Expiration Date: 1/2021    LIDOCAINE  NDC: 0762-3623-27  LOT: [id number]  EXP: 8/20    Rt.  Shoulder

## 2019-05-06 NOTE — LETTER
Physical Therapy Rehabilitation Referral    Patient Name:  Rocky Hanley      YOB: 1965    Diagnosis:    1. Chronic right shoulder pain    2. Biceps tendonitis on right    3. Shoulder stiffness, right    she has some characteristics of a early frozen shoulder  Precautions:     [x] Evaluate and Treat    Post Op Instructions:  [] Continuous passive motion (CPM) [] Elbow ROM  [x] Exercise in plane of scapula  []  Strengthening     [] Pulley and instruction   [x] Home exercise program (copy to patient)   [] Sling when arm at risk  [] Sling or brace at all times   [] AAROM: Forward elevation to  140            [] AAROM: External rotation  To  40    [] Isometric external rotator strengthening [] AAROM: internal rotation: up the back  [x] Isometric abductor strengthening  [] AAROM: Internal abduction   [] Isometric internal rotator strengthening [] AAROM: cross-body adduction             Stretching:     Strengthening:  [x] Four quadrant (FE, ER, IR, CBA)  [x] Rotator cuff (ER, IR, Abd)  [x] Forward Elevation    [] External Rotators     [x] External Rotation    [] Internal Rotators  [x] Internal Rotation: up/back   [] Abductors     [x] Internal Rotation: supine in abduction  [x] Sleeper Stretch    [] Flexors  [x] Cross-body abduction    [] Extensors  [x] Pendulum (FE, Abd/Add, cw/ccw)  [x] Scapular Stabilizers   [x] Wall-walking (FE, Abd)        [x] Shoulder shrugs     [x] Table slides (FE)                [x] Rhomboid pinch  [] Elbow (flex, ext, pron, sup)        [] Lat.  Pull downs     [] Medial epicondylitis program       [] Forward punch   [] Lateral epicondylitis program       [] Internal rotators     [] Progressive resistive exercises  [] Bench Press        [] Bench press plus  Activities:     [] Lateral pull-downs  [] Rowing     [x] Progressive two-hand supine press  [] Stepper/Exercise bike   [x] Biceps: curls/supination  [] Swimming  [] Water exercises    Modalities:     Return to Sport: [x] Of Choice      [] Plyometrics  [] Ultrasound     [] Rhythmic stabilization  [] Iontophoresis    [] Core strengthening   [] Moist heat     [] Sports specific program:   [] Massage         [x] Cryotherapy      [] Electrical stimulation     [] Paraffin  [] Whirlpool  [] TENS    [x] Home exercise program (copy to patient). Perform exercises for:   15     minutes    3      times/day  [x] Supervised physical therapy  Frequency: []  1x week  [x] 2x week  [] 3x week  [] Other:   Duration: [] 2 weeks   [] 4 weeks  [x] 6 weeks  [] Other:     Additional Instructions:   Please work on stretches first and once the motion is restored, she can work on strengthening of biceps and rotator cuff.           Malka Mead PA-C

## 2019-05-06 NOTE — LETTER
Physical Therapy Rehabilitation Referral    Patient Name:  Pb Kellogg      YOB: 1965    Diagnosis:    1. Chronic right shoulder pain        Precautions:     [x] Evaluate and Treat    Post Op Instructions:  [] Continuous passive motion (CPM) [] Elbow ROM  [x] Exercise in plane of scapula  []  Strengthening     [] Pulley and instruction   [x] Home exercise program (copy to patient)   [] Sling when arm at risk  [] Sling or brace at all times   [] AAROM: Forward elevation to  140            [] AAROM: External rotation  To  40    [] Isometric external rotator strengthening [] AAROM: internal rotation: up the back  [x] Isometric abductor strengthening  [] AAROM: Internal abduction   [] Isometric internal rotator strengthening [] AAROM: cross-body adduction             Stretching:     Strengthening:  [x] Four quadrant (FE, ER, IR, CBA)  [x] Rotator cuff (ER, IR, Abd)  [x] Forward Elevation    [] External Rotators     [x] External Rotation    [] Internal Rotators  [x] Internal Rotation: up/back   [] Abductors     [x] Internal Rotation: supine in abduction  [x] Sleeper Stretch    [] Flexors  [x] Cross-body abduction    [] Extensors  [x] Pendulum (FE, Abd/Add, cw/ccw)  [x] Scapular Stabilizers   [x] Wall-walking (FE, Abd)        [x] Shoulder shrugs     [x] Table slides (FE)                [x] Rhomboid pinch  [] Elbow (flex, ext, pron, sup)        [] Lat.  Pull downs     [] Medial epicondylitis program       [] Forward punch   [] Lateral epicondylitis program       [] Internal rotators     [] Progressive resistive exercises  [] Bench Press        [] Bench press plus  Activities:     [] Lateral pull-downs  [] Rowing     [x] Progressive two-hand supine press  [] Stepper/Exercise bike   [x] Biceps: curls/supination  [] Swimming  [] Water exercises    Modalities:     Return to Sport:  [x] Of Choice      [] Plyometrics  [] Ultrasound     [] Rhythmic stabilization [] Iontophoresis    [] Core strengthening   [] Moist heat     [] Sports specific program:   [] Massage         [x] Cryotherapy      [] Electrical stimulation     [] Paraffin  [] Whirlpool  [] TENS    [x] Home exercise program (copy to patient). Perform exercises for:   15     minutes    3      times/day  [x] Supervised physical therapy  Frequency: []  1x week  [x] 2x week  [] 3x week  [] Other:   Duration: [] 2 weeks   [] 4 weeks  [x] 6 weeks  [] Other:     Additional Instructions:   Please work on stretches first and once the motion is restored, she can work on strengthening of biceps and rotator cuff.           Florecita Subramanian PA-C

## 2019-05-06 NOTE — PROGRESS NOTES
Review of Systems   Constitutional:        Recent weight change   HENT:        Headaches-TMJ  Blurry vision   Cardiovascular: Positive for leg swelling. Gastrointestinal:        Hemorrhoids   Musculoskeletal: Positive for back pain and neck pain.        DONE: 5/6/19

## 2019-05-07 ENCOUNTER — TELEPHONE (OUTPATIENT)
Dept: FAMILY MEDICINE CLINIC | Age: 54
End: 2019-05-07

## 2019-05-07 DIAGNOSIS — E55.9 VITAMIN D DEFICIENCY: Primary | ICD-10-CM

## 2019-05-07 RX ORDER — ERGOCALCIFEROL (VITAMIN D2) 1250 MCG
50000 CAPSULE ORAL WEEKLY
Qty: 4 CAPSULE | Refills: 2 | Status: SHIPPED | OUTPATIENT
Start: 2019-05-07 | End: 2019-08-14 | Stop reason: ALTCHOICE

## 2019-05-08 ENCOUNTER — HOSPITAL ENCOUNTER (OUTPATIENT)
Dept: PHYSICAL THERAPY | Age: 54
Setting detail: THERAPIES SERIES
Discharge: HOME | End: 2019-05-08
Payer: COMMERCIAL

## 2019-05-08 PROCEDURE — 97140 MANUAL THERAPY 1/> REGIONS: CPT

## 2019-05-08 PROCEDURE — 97161 PT EVAL LOW COMPLEX 20 MIN: CPT

## 2019-05-08 PROCEDURE — 97110 THERAPEUTIC EXERCISES: CPT

## 2019-05-08 NOTE — PROGRESS NOTES
Limitations:   [x]Yes   []No  Functional Complaints:  Pain at work, unable to reach up to connect bra    PLOF:   [x]No functional limitations   []Pt with previous functional limitations  Pt's sleep is affected?    [x]Yes   []No         Patient goal for therapy:  \"Full use of my arm again\"      OBJECTIVE FINDINGS      Posture: [x]WNL  []Forward head  []Forward flexed trunk   []Pronounced CT junction    []Increased thoracic kyphosis   []Increased lumbar lordosis   []Scoliosis   []Swayback  []Decreased WB on   []R   []L   []Scapular winging  []Other:       Range of Motion   []Cervical Spine   [x]Thoracic Spine     Range Tested AROM PROM MMT/Resisted Tests   Flexion dec     Extension dec     Sidebending Left wnl     Sidebending Right dec     Rotation Left wnl     Rotation Right dec     Comments:    UE Range of Motion/Strength Testing     [x]All ROM WNL except as marked below   [x]All strength WNL (5/5) except as marked below (measured out of 5)     Range Tested AROM PROM Resisted Comments   *denotes pain Left Right Left Right Left Right    Shoulder Flexion 145/ 160 135/ 150  175 4 4 PROM In supine after manual tx   Shoulder Extension NL NL   4+ 4    Shoulder Abduction 165 145   4+ 4    Shoulder Adduction  NL NL   4+ 4+    Shoulder IR  45   4+ 4+    Shoulder ER  60   4+ 4    Elbow Flexion     5 5    Elbow Extension      5 5    Pronation     5 5    Supination     5 5    Wrist Flexion          Wrist Extension          Radial Deviation          Ulnar Deviation                5 5                Scapular Strength    []All WNL (5/5) except as marked below (measured out of 5)     Scapula Left Right   Upper Trapezius 4 4   Middle Trapezius 4- 4-   Lower Trapezius     Rhomboid     Serratus Anterior       Latissimus Dorsi          Dermatomes/Myotomes     [x]All dermatomes WNL for light touch except as marked below  [x]All myotomes WNL (5/5) except as marked below (measured out of 5)    Dermatomes Left Right Myotomes Left Right Posterior Head (C2)   Cervical Flexion (C1-2)     Posterior Neck (C3)   Cervical Sidebend (C3)     Supraclavicular (C4)   Shoulder Shrug (C4)     Lateral Upper Arm (C5)   Shoulder Abduction(C5)     Lateral Forearm/1st(C6)   Elbow flexion (C6)     3rd digit (C7)   Elbow extension (C7)     5th digit (C8)    Wrist extension (C6)     Medial Arm (T1)    Wrist flexion (C7)        Thumb Extension (C8)        Intrinsics (T1)      Comments:      Flexibility     Muscle Findings   Pectoralis Minor []WNL   [x]Decreased R   [x]Decreased L   []NT   Levator Scapula []WNL   []Decreased R   []Decreased L   []NT   Scalenes []WNL   []Decreased R   []Decreased L   []NT   Upper Trapezius  []WNL   [x]Decreased R   [x]Decreased L   []NT   Suboccipitals  []WNL   []Decreased R   []Decreased L   []NT   Other:  []WNL   []Decreased R   []Decreased L   []NT     Special Tests- UE seated     Special Test Findings   Empty can test/supraspinatus [x]Neg   []Pos R   []Pos L   []NT   Drop arm test [x]Neg   []Pos R   []Pos L   []NT   Neer's impingement [x]Neg   []Pos R   []Pos L   []NT   Speed's test  [x]Neg   []Pos R   []Pos L   []NT   Elbow varus stress []Neg   []Pos R   []Pos L   []NT   Elbow valgus stress  []Neg   []Pos R   []Pos L   []NT   Tinel's sign  []Neg   []Pos R   []Pos L   []NT  []Elbow   []Wrist    Finkelstein's test []Neg   []Pos R   []Pos L   []NT   Phalen's test  []Neg   []Pos R   []Pos L   []NT   Other []Neg   []Pos R   []Pos L   []NT   Other  []Neg   []Pos R   []Pos L   []NT     Palpation     Patient reported tenderness with palpation:  [x]Yes   []No   []NA  Location:  Painful palpation to Right thoracic ribs in region of T4-5  PT notes warmth:  []Yes   [x]No   []NA  Location:   PT notes increased muscle tone:   []Yes   [x]No   []NA  Location:   PT notes crepitus with palpation:   []Yes   [x]No   []NA  Location:   Ligament tenderness/provocation:   []Yes   [x]No   []NA  Location:    PT notes decreased scar mobility:   []Yes   [x]No

## 2019-05-08 NOTE — FLOWSHEET NOTE
Outpatient Physical Therapy     [x] Daily Treatment Note   [] Progress Note   [] Discharge Note    Date:  5/8/2019    Patient Name:  Earl Moy         YOB: 1965    Medical Diagnosis:   Chronic Right shoulder pain, biceps tendonitis on R, Shoulder stiffness R  ICD 10:    Treatment Diagnosis: Thoracic alignment syndrome, limited ROM, shoulder pain                                         Onset Date:    4/1/19    Referral Date:  5/6/19                 Referring Physician: Lisette Felix PA-C                                                    Visits Allowed/Insurance/Certification Information: [facility] [employer]     Restrictions/Precautions: no restrictions     Pt Occupation/Job Duties:  Pt works at [employer] in [address] of care sent to provider:   [x]NA   []Faxed   []Co-signature       Plan of care signed:    [x]NA   []Yes   []No      Progress Note covers period from (if applicable):    [x]NA    []From          To           Next Progress Note due:   6/8/19    Visit# / total visits:  1/12    Plan for Next Session:  Repeat manual thoracic techniques, review HEP, progress strengthening. Subjective:  See eval     Pain level:   AT EVAL:       Objective:       Exercises:    Exercises in bold performed in department today. Items not bolded are carried forward from prior visits for continuity of the record. Exercise/Equipment Resistance/Repetitions Other comments          AAROM supine flexion   x10    Sidelying IR/ER stretch   x10           Isometric ADD   x10    Isometric EXT   x10                                                                                             Therapeutic Exercise/Home Exercise Program:   10 minutes  Pt education provided regarding anatomy and physiology associated with dx, etiology of  Symptoms and plan of care. All questions answered to pt satisfaction.        Group Therapy:    0 minutes    Therapeutic Activity:  0 minutes     Gait: 0 minutes    Neuromuscular

## 2019-05-14 ENCOUNTER — HOSPITAL ENCOUNTER (OUTPATIENT)
Dept: PHYSICAL THERAPY | Age: 54
Setting detail: THERAPIES SERIES
Discharge: HOME | End: 2019-05-14
Payer: COMMERCIAL

## 2019-05-14 PROCEDURE — 97110 THERAPEUTIC EXERCISES: CPT

## 2019-05-14 PROCEDURE — 97140 MANUAL THERAPY 1/> REGIONS: CPT

## 2019-05-14 NOTE — FLOWSHEET NOTE
Outpatient Physical Therapy     [x] Daily Treatment Note   [] Progress Note   [] Discharge Note    Date:  5/14/2019    Patient Name:  Shelly Claudio         YOB: 1965    Medical Diagnosis:   Chronic Right shoulder pain, biceps tendonitis on R, Shoulder stiffness R  ICD 10:    Treatment Diagnosis: Thoracic alignment syndrome, limited ROM, shoulder pain                                         Onset Date:    4/1/19    Referral Date:  5/6/19                 Referring Physician: Kemar Dick PA-C                                                    Visits Allowed/Insurance/Certification Information: [employer]     Restrictions/Precautions: no restrictions     Pt Occupation/Job Duties:  Pt works at [employer] in [address] of care sent to provider:   [x]NA   []Faxed   []Co-signature       Plan of care signed:    [x]NA   []Yes   []No      Progress Note covers period from (if applicable):    [x]NA    []From          To           Next Progress Note due:   6/8/19    Visit# / total visits:  3/12    Plan for Next Session:  Repeat manual thoracic techniques, review HEP, progress strengthening. Subjective: Only has pain if moving it a certain way. Shooting into forearm greatly improved, almost completely gone. Pain level: 0/10 at rest.  Up to a 7 at times. AT EVAL:        Objective:    ROM :   180 degrees PROM flexion with manual tx. 160 AROM flexion at end of tx. IR at 90 degrees abduction 65     Exercises:    Exercises in bold performed in department today. Items not bolded are carried forward from prior visits for continuity of the record.   Exercise/Equipment Resistance/Repetitions Other comments          AAROM supine flexion   x10 Cues for form   R sidely sleeper stretch 3x30\"           Isometric ADD   x10    Isometric EXT   x10      R shoulder IR/ER neuro re-ed x2'             Rows red tband 1x10 HEP                                                                       Therapeutic Exercise/Home Exercise Program:   10 minutes  Pt education provided regarding anatomy and physiology associated with dx, etiology of  Symptoms and plan of care. All questions answered to pt satisfaction. Review of form and technique for HEP    Group Therapy:    0 minutes    Therapeutic Activity:  0 minutes     Gait: 0 minutes    Neuromuscular Re-Education:  0 minutes      Canalith Repositioning Procedure:      Manual Therapy:  20 minutes  Seated cervico-thoracic distraction mob - with cavitation   Seated A-P thoracic manipulation - without cavitation  Sidelying MET for thoracic rib torsion x5  Sub scap release, UT release,   1st and 2nd rib mob   R UE capsule stretch   Contract - Relax strength to shoulder capsule/MMs for IR. x5   Post Mob R shoulder with IR/ER    Modalities: 0 minutes    Functional Outcome Measure:   [x]NA  Measure Used:   Date Assessed:    Assessment/Treatment/Activity Tolerance:    Patients response to treatment:   [x]Patient tolerated treatment well []Patient limited by fatigue   []Patient limited by pain  []Patient limited by other medical complications   []Other:     GOALS    Short Term Goals: 3    weeks Long Term Goals:    6 weeks   1). Establish HEP 1). Pt independent with HEP   2). Pain 4 /10 or less with activity 2). Pain  2/10 or less at highest   3). Increase AROM 10 degrees 3). Achieve full AROM to 170 bilat   4). Increase strength 1/3 grade 4). Achieve 5/5 bilat    5).           Prognosis: [x]Good   []Fair   []Poor    Patient Requires Follow-up:  [x]Yes  []No    Plan: []Plan of care initiated     [x]Continue per plan of care    [] Alter current plan (see comments)    []Hold pending MD visit []Discharge    Timed Code Treatment Minutes:  30  Total Treatment Minutes:  30    Medicare Cap total YTD:  NA    Electronically signed by:  Annie Sanchez, PT, DPT #[id number]

## 2019-05-16 ENCOUNTER — HOSPITAL ENCOUNTER (OUTPATIENT)
Dept: PHYSICAL THERAPY | Age: 54
Setting detail: THERAPIES SERIES
Discharge: HOME | End: 2019-05-16
Payer: COMMERCIAL

## 2019-05-16 PROCEDURE — 97140 MANUAL THERAPY 1/> REGIONS: CPT

## 2019-05-16 PROCEDURE — 97110 THERAPEUTIC EXERCISES: CPT

## 2019-05-30 ENCOUNTER — HOSPITAL ENCOUNTER (OUTPATIENT)
Dept: PHYSICAL THERAPY | Age: 54
Setting detail: THERAPIES SERIES
Discharge: HOME | End: 2019-05-30
Payer: COMMERCIAL

## 2019-05-30 PROCEDURE — 97140 MANUAL THERAPY 1/> REGIONS: CPT

## 2019-05-30 NOTE — FLOWSHEET NOTE
Outpatient Physical Therapy     [x] Daily Treatment Note   [] Progress Note   [] Discharge Note    Date:  5/30/2019    Patient Name:  Betty Thompson         YOB: 1965    Medical Diagnosis:   Chronic Right shoulder pain, biceps tendonitis on R, Shoulder stiffness R  ICD 10:    Treatment Diagnosis: Thoracic alignment syndrome, limited ROM, shoulder pain                                         Onset Date:    4/1/19    Referral Date:  5/6/19                 Referring Physician: Lauren Wall PA-C                                                    Visits Allowed/Insurance/Certification Information: [employer]     Restrictions/Precautions: no restrictions     Pt Occupation/Job Duties:  Pt works at [address] in [address] of care sent to provider:   [x]NA   []Faxed   []Co-signature       Plan of care signed:    [x]NA   []Yes   []No      Progress Note covers period from (if applicable):    [x]NA    []From          To           Next Progress Note due:   6/8/19    Visit# / total visits:  5/12    Plan for Next Session:  Repeat manual thoracic techniques, review HEP, progress strengthening. Subjective:  On vacation last week, and thinks she overdid it. 3-4/10 with cleaning out garage. Tightness in R shoulder UT area. Pain level:currently 2-3/10  Dull ache    Up to a 7 at times. AT EVAL:        Objective:    ROM :     Exercises:    Exercises in bold performed in department today. Items not bolded are carried forward from prior visits for continuity of the record.   Exercise/Equipment Resistance/Repetitions Other comments          AAROM supine flexion   x10 Cues for form   R sidely sleeper stretch 3x30\"           Isometric ADD   x10    Isometric EXT   x10      R shoulder IR/ER neuro re-ed x2'             Rows red tband 1x10 HEP    scap retractions and depressions   3x10 ea      TMJ ex clucking, resting position and yudith's PRN To decrease neck and face tension therefore decreasing shoulder hiking Therapeutic Exercise/Home Exercise Program:   5 minutes  Pt education provided regarding anatomy and physiology All questions answered to pt satisfaction. Review of form and technique for HEP     Group Therapy:    0 minutes    Therapeutic Activity:  0 minutes     Gait: 0 minutes    Neuromuscular Re-Education:  0 minutes      Canalith Repositioning Procedure:      Manual Therapy:  25 minutes  Seated cervico-thoracic distraction mob - with cavitation   Prone PA thoracic manipulation - with cavitation  Sidelying MET for thoracic rib torsion x5  Sub scap release, UT release,   1st and 2nd rib mob   R UE capsule stretchs     Post Mob R shoulder with IR/ER    Modalities: 0 minutes  Ice to go     Functional Outcome Measure:   [x]NA  Measure Used:   Date Assessed:    Assessment/Treatment/Activity Tolerance:    Patients response to treatment:   [x]Patient tolerated treatment well []Patient limited by fatigue   []Patient limited by pain  []Patient limited by other medical complications   []Other:     GOALS    Short Term Goals: 3    weeks Long Term Goals:    6 weeks   1). Establish HEP 1). Pt independent with HEP   2). Pain 4 /10 or less with activity 2). Pain  2/10 or less at highest   3). Increase AROM 10 degrees 3). Achieve full AROM to 170 bilat   4). Increase strength 1/3 grade 4). Achieve 5/5 bilat    5).           Prognosis: [x]Good   []Fair   []Poor    Patient Requires Follow-up:  [x]Yes  []No    Plan: []Plan of care initiated     [x]Continue per plan of care    [] Alter current plan (see comments)    []Hold pending MD visit []Discharge    Timed Code Treatment Minutes:  30  Total Treatment Minutes:  27  2 man   [id number]    Medicare Cap total YTD:  NA    Electronically signed by:  Kalie Green, PT, DPT #[id number]

## 2019-05-31 ENCOUNTER — EMPLOYEE WELLNESS (OUTPATIENT)
Dept: OTHER | Age: 54
End: 2019-05-31

## 2019-05-31 LAB
CHOLESTEROL, TOTAL: 156 MG/DL (ref 0–199)
GLUCOSE BLD-MCNC: 84 MG/DL (ref 70–99)
HDLC SERPL-MCNC: 43 MG/DL (ref 40–60)
LDL CHOLESTEROL CALCULATED: 98 MG/DL
TRIGL SERPL-MCNC: 73 MG/DL (ref 0–150)

## 2019-06-03 VITALS — BODY MASS INDEX: 29.86 KG/M2 | WEIGHT: 185 LBS

## 2019-06-05 ENCOUNTER — HOSPITAL ENCOUNTER (OUTPATIENT)
Dept: PHYSICAL THERAPY | Age: 54
Setting detail: THERAPIES SERIES
Discharge: HOME | End: 2019-06-05
Payer: COMMERCIAL

## 2019-06-05 PROCEDURE — 97110 THERAPEUTIC EXERCISES: CPT

## 2019-06-05 PROCEDURE — 97140 MANUAL THERAPY 1/> REGIONS: CPT

## 2019-06-05 NOTE — FLOWSHEET NOTE
Outpatient Physical Therapy     [x] Daily Treatment Note   [] Progress Note   [] Discharge Note    Date:  6/5/2019    Patient Name:  Zuleima Nowak         YOB: 1965    Medical Diagnosis:   Chronic Right shoulder pain, biceps tendonitis on R, Shoulder stiffness R  ICD 10:    Treatment Diagnosis: Thoracic alignment syndrome, limited ROM, shoulder pain                                         Onset Date:    4/1/19    Referral Date:  5/6/19                 Referring Physician: Delores Veloz PA-C                                                    Visits Allowed/Insurance/Certification Information: [employer]     Restrictions/Precautions: no restrictions     Pt Occupation/Job Duties:  Pt works at [employer] in [address] of care sent to provider:   [x]NA   []Faxed   []Co-signature       Plan of care signed:    [x]NA   []Yes   []No      Progress Note covers period from (if applicable):    [x]NA    []From          To           Next Progress Note due:   6/8/19    Visit# / total visits:  6/12    Plan for Next Session:  Review tolerance to HEP, cont to progress strengthening. Teach home MET and 1st rib mob techniques. Consider DC in next few visits if remains pain free. Subjective: States that she has been feeling pretty good. The last time she experienced any pain for symptoms was the weds of her vacation (over a week ago). At that time she did experience a return of the pain into her right arm, but it only lasted a few days and then went away. Still feels tight from time to time but it does not hurt. States that she is surprised at how much better she feels overall since starting therapy. Pain level:currently 0/10. AT EVAL:        Objective:    ROM :     Exercises:    Exercises in bold performed in department today. Items not bolded are carried forward from prior visits for continuity of the record.   Exercise/Equipment Resistance/Repetitions Other comments          AAROM supine flexion x10 Cues for form   R sidely sleeper stretch 3x30\"           Isometric ADD   x10    Isometric EXT   x10      R shoulder IR/ER neuro re-ed x2'           Mid Rows red tband 2x10 HEP   Low Rows red tband 2x10    UE ADD with RTB 2x10    R UE extension 2x10          scap retractions and depressions   3x10 ea      TMJ ex clucking, resting position and yudith's PRN To decrease neck and face tension therefore decreasing shoulder hiking           Next Visit:  UT stretch                         Self 1st rib mob                       Self Rib torsion MET                            Therapeutic Exercise/Home Exercise Program:  10 minutes  Pt education provided regarding anatomy and physiology All questions answered to pt satisfaction. Review of form and technique for HEP. Progressed HEP to include scapular strengthening. Monitor for return of symptoms     Group Therapy:    0 minutes    Therapeutic Activity:  0 minutes     Gait: 0 minutes    Neuromuscular Re-Education:  0 minutes      Canalith Repositioning Procedure:      Manual Therapy:  30 minutes  Seated cervico-thoracic distraction mob - with cavitation   Seated A-pthoracic manipulation - with cavitation  Sidelying MET for thoracic rib torsion x5  Sub scap release, UT release,   1st and 2nd rib mob   R UE capsule stretch , MFR unwinding         Modalities: 0 minutes  Ice to go     Functional Outcome Measure:   [x]NA  Measure Used:   Date Assessed:    Assessment/Treatment/Activity Tolerance:    Patients response to treatment:   [x]Patient tolerated treatment well []Patient limited by fatigue   []Patient limited by pain  []Patient limited by other medical complications   []Other:     GOALS    Short Term Goals: 3    weeks Long Term Goals:    6 weeks   1). Establish HEP 1). Pt independent with HEP   2). Pain 4 /10 or less with activity 2). Pain  2/10 or less at highest   3). Increase AROM 10 degrees 3). Achieve full AROM to 170 bilat   4).   Increase strength 1/3 grade 4). Achieve 5/5 bilat    5).           Prognosis: [x]Good   []Fair   []Poor    Patient Requires Follow-up:  [x]Yes  []No    Plan: []Plan of care initiated     [x]Continue per plan of care    [] Alter current plan (see comments)    []Hold pending MD visit []Discharge    Timed Code Treatment Minutes:  40  Total Treatment Minutes:  36  2 man , 1 Ex      Medicare Cap total YTD:  NA    Electronically signed by:  Makenzie Orosco, PT, MPT, OMT-c [id number]

## 2019-06-12 ENCOUNTER — HOSPITAL ENCOUNTER (OUTPATIENT)
Dept: PHYSICAL THERAPY | Age: 54
Setting detail: THERAPIES SERIES
Discharge: HOME | End: 2019-06-12
Payer: COMMERCIAL

## 2019-06-12 PROCEDURE — 97110 THERAPEUTIC EXERCISES: CPT

## 2019-06-12 PROCEDURE — 97140 MANUAL THERAPY 1/> REGIONS: CPT

## 2019-06-12 NOTE — FLOWSHEET NOTE
Outpatient Physical Therapy     [x] Daily Treatment Note   [] Progress Note   [x] Discharge Note    Date:  6/12/2019    Patient Name:  Kashmir Hernandez         YOB: 1965    Medical Diagnosis:   Chronic Right shoulder pain, biceps tendonitis on R, Shoulder stiffness R  ICD 10:    Treatment Diagnosis: Thoracic alignment syndrome, limited ROM, shoulder pain                                         Onset Date:    4/1/19    Referral Date:  5/6/19                 Referring Physician: Amarilys Lozano PA-C                                                    Visits Allowed/Insurance/Certification Information: [employer]     Restrictions/Precautions: no restrictions     Pt Occupation/Job Duties:  Pt works at [employer] in [address] of care sent to provider:   [x]NA   []Faxed   []Co-signature       Plan of care signed:    [x]NA   []Yes   []No      Progress Note covers period from (if applicable):    []NA    [x]From  5/8/19-6/12/19       Next Progress Note due:   NA    Visit# / total visits:  7/12    Plan for Next Session:  Patient should continue with HEP independently. Subjective: Patient feeling really good. Was doing a lot of spring cleaning at her house this weekend and noticed some reoccurrence of her symptoms (pain in dorsal aspect R forearm) that only last for a few hours for both day. She feels she may have overdone it. Patient able to sleep through the night without waking due to pain. Patient reports a 75% improvement with PT treatment. Patient reports full return to function without limitations noted. Patient on vacation next week and in agreement with d/c from PT at this time. Pain level:currently 0/10, 5/10 at worst for brief periods in R dorsal forearm  AT EVAL:        Objective:    AROM :  R shoulder flex 180, abd 180, ext 60, ER 90, IR 67 (both in 90 deg abd)  MMT:  R shoulder flex 4/5, otherwise 5/5 throughout    Exercises:    Exercises in bold performed in department today. Items not bolded are carried forward from prior visits for continuity of the record. Exercise/Equipment Resistance/Repetitions Other comments          AAROM supine flexion   x10 Cues for form   R sidely sleeper stretch 3x30\"           Isometric ADD   x10    Isometric EXT   x10      R shoulder IR/ER neuro re-ed x2'           Mid Rows red tband 2x10 HEP   Low Rows red tband 2x10    UE ADD with RTB 2x10    R UE extension 2x10          scap retractions and depressions   3x10 ea      TMJ ex clucking, resting position and yudith's PRN To decrease neck and face tension therefore decreasing shoulder hiking             UT stretch   30 sec/ x3     Self 1st rib mob 30 sec/ x3      B shoulder flex to 90 deg with tband        X10, red                           Therapeutic Exercise/Home Exercise Program:  25 minutes. Reviewed and progressed exercises as noted above with new handout given. Postural education provided due to flexed posture with excessive shoulder hiking noted. Also instructed in postural ed while performing exercises to ensure proper technique. Assessment done for d/c note. Group Therapy:    0 minutes    Therapeutic Activity:  0 minutes     Gait: 0 minutes    Neuromuscular Re-Education:  0 minutes      Canalith Repositioning Procedure:      Manual Therapy:  35 minutes  SOR  TPR to UT, SCM, scalenes, levator scap, and cervical paraspinals  Cervical traction  R first rib inferior mobs  Manual stretches of UT, levator scap, MFR upper cervical stretch, and bilateral shoulder depression. Patient was instructed in deep breathing to help with relaxation and posture to prevent guarding and to facilitate neutral alignment. Patient verbalized significant relief in muscle tension and improved posture with no reports of pain or any symptoms after manual treatment.            Modalities: 0 minutes  Ice to go     Functional Outcome Measure:   [x]NA  Measure Used:   Date Assessed:    Assessment/Treatment/Activity Tolerance:  Patient made great progress with PT treatment. R shoulder AROM and strength WNL with full return to functional tasks. Posture improved after manual therapy. Demonstrates good understanding and compliance with HEP. No further PT treatment needed at this time. Patients response to treatment:   [x]Patient tolerated treatment well with no adverse reactions noted []Patient limited by fatigue   []Patient limited by pain  []Patient limited by other medical complications   [x]Other: pain 0/10 after treatment      GOALS  - all goals met except for LTG #2. Short Term Goals: 3    weeks Long Term Goals:    6 weeks   1). Establish HEP- MET 1). Pt independent with HEP- MET   2). Pain 4 /10 or less with activity- partially met 2). Pain  2/10 or less at highest- not met   3). Increase AROM 10 degrees- MET 3). Achieve full AROM to 170 bilat- MET   4). Increase strength 1/3 grade- MET 4). Achieve 5/5 bilat - MET except for bilateral shoulder flex   5).           Prognosis: [x]Good   []Fair   []Poor    Patient Requires Follow-up:  []Yes  [x]No    Plan: []Plan of care initiated     []Continue per plan of care    [] Alter current plan (see comments)    []Hold pending MD visit [x]Discharge    Timed Code Treatment Minutes:  60  Total Treatment Minutes:  61  2 man , 2 Ex      Medicare Cap total YTD:  NA    Electronically signed by:  Viry Navarro PT, MPT [id number]

## 2019-08-14 ENCOUNTER — OFFICE VISIT (OUTPATIENT)
Dept: FAMILY MEDICINE CLINIC | Age: 54
End: 2019-08-14
Payer: COMMERCIAL

## 2019-08-14 VITALS
OXYGEN SATURATION: 96 % | WEIGHT: 187 LBS | SYSTOLIC BLOOD PRESSURE: 118 MMHG | HEIGHT: 66 IN | RESPIRATION RATE: 14 BRPM | HEART RATE: 76 BPM | BODY MASS INDEX: 30.05 KG/M2 | DIASTOLIC BLOOD PRESSURE: 78 MMHG

## 2019-08-14 DIAGNOSIS — E55.9 VITAMIN D DEFICIENCY: ICD-10-CM

## 2019-08-14 DIAGNOSIS — M25.60 JOINT STIFFNESS: Primary | ICD-10-CM

## 2019-08-14 PROCEDURE — 99213 OFFICE O/P EST LOW 20 MIN: CPT | Performed by: FAMILY MEDICINE

## 2019-08-14 ASSESSMENT — PATIENT HEALTH QUESTIONNAIRE - PHQ9
SUM OF ALL RESPONSES TO PHQ QUESTIONS 1-9: 0
SUM OF ALL RESPONSES TO PHQ QUESTIONS 1-9: 0
SUM OF ALL RESPONSES TO PHQ9 QUESTIONS 1 & 2: 0
2. FEELING DOWN, DEPRESSED OR HOPELESS: 0
1. LITTLE INTEREST OR PLEASURE IN DOING THINGS: 0

## 2019-08-15 LAB — VITAMIN D 25-HYDROXY: 33.2 NG/ML

## 2019-09-20 ENCOUNTER — HOSPITAL ENCOUNTER (OUTPATIENT)
Dept: PHYSICAL THERAPY | Age: 54
Setting detail: THERAPIES SERIES
Discharge: HOME | End: 2019-09-20
Payer: COMMERCIAL

## 2019-09-20 PROCEDURE — 97161 PT EVAL LOW COMPLEX 20 MIN: CPT

## 2019-09-20 PROCEDURE — 97110 THERAPEUTIC EXERCISES: CPT

## 2019-09-20 NOTE — PROGRESS NOTES
Triceps (C7) 3+ 3+   Quadriceps (L3,4) 2+ 2+   Achilles (S1,2) 2+ 2+   Ankle clonus Negative Negative   Garcia's reflex  Negative Negative   Babinski's reflex  NT NT   Strength     Trunk Extensors Not tested    Gluteals Not tested    Abdominals Not tested      LE Range of Motion/Strength Testing   All strength tested on 5 point scale  All ROM WNL with exceptions noted below    Range Tested AROM PROM MMT/Resisted Comments   *denotes pain Left Right Left Right Left Right    Hip Flexion 97 107   5/5 5/5    Hip Extension 58 64   4+ 5/5    Hip Abduction     4+ 4+    Hip Adduction     4+ 4+    Hip IR 30 32   Not tested Not tested    Hip ER 50 60   Not tested Not tested    Knee Flexion     4- 4-    Knee Extension     5/5 5/5    Ankle Dorsiflex     WNL WNL    Ankle Plantarflex     WNL WNL    Ankle Inversion     WNL WNL    Ankle Eversion     WNL WNL      Special Tests- seated     Special Test Findings   Seated pelvic landmarks iliac crests equal   Sitting flexion test  Negative   Hip IR PROM  decreased on L   Slump test/Dural tension  Not tested     Flexibility     Muscle Findings   Hip flexors/Juaquin  WNL   Hamstrings  Degrees in 90/90 Decreased bilaterally   Right:  25            Left:   35   Gastrocs NT   Obers/TFL/ITB NT   Piriformis  NT   Other: quad flexibility decreased on L compared to right     Special Tests Lumbosacral and hip- supine/sidelying/prone   Special Test Findings   Sit up test/ Supine Long sit test   Negative   SI distraction NT   Thigh Thrust test Not tested   90/90 test  Not tested   Gaenslen's test Not tested   Straight Leg Raise Negative   Crams Not tested   Lumbar Distraction  NT   Hip scour Negative   Gayle's test Positive on L   Hugo's test Positive on L   Oscillation Not tested   Ant/Post Provocation  Not tested   SI compression NT   Prone knee bend test Not tested   Femoral nerve tension test Not tested   Pheasant test Not tested   Sacral thrusts  NT     Specific Joint Mobility minutes     Total Treatment Time:  60 minutes    Jim Edmond PT  License #[id number]

## 2019-09-20 NOTE — FLOWSHEET NOTE
exercise and plan of care. Double knee rocks, single knee rocks   1x10    Single knee to chest  3 reps hold 10 seconds     quad stretch standing and hip flexor stretch over side of bed  3 reps hold 10 seconds                                                                                                           Therapeutic Exercise/Home Exercise Program:   25 minutes  See above. Educated pt on condition and treatment. Group Therapy:    0 minutes    Therapeutic Activity:  0 minutes     Gait: 0 minutes    Neuromuscular Re-Education:  0 minutes      Canalith Repositioning Procedure:  0 minutes    Manual Therapy:  0 minutes    Modalities: 0 minutes    Functional Outcome Measure:   []NA  Measure Used:   Date Assessed:  Score:     Assessment/Treatment/Activity Tolerance: pt tolerated exercise with some limitation secondary to tightness. Instructed pt it stretch to only stretch and not pain. Patients response to treatment:   [x]Patient tolerated treatment well []Patient limited by fatigue   []Patient limited by pain  []Patient limited by other medical complications   []Other:     Goals:   Progress towards goals:  Goals established on 9/20/19  GOALS    Short Term Goals:  2 weeks Long Term Goals:  4 weeks   1). Establish HEP 1). Pt independent with HEP   2). Pain  3/10 or less at worst  2). Pain  1/10 or less at worst   3). Increased hamstring flexibility to 15 in 90/90 position. 3). Increase flexibility in left hip flexion by 10 degrees   4). Increase bilateral LE strength to 5/5 overall. 4). Increased left quad flexibility to equal right   5). 5). Pt able to put on shoe on left LE without difficulty   6). 6).  Pt able to wash foot in shower with no noted difficulty.       Prognosis: [x]Good   []Fair   []Poor    Patient Requires Follow-up:  [x]Yes  []No    Plan: [x]Plan of care initiated     []Continue per plan of care    [] Alter current plan (see comments)    []Hold pending MD

## 2019-09-25 ENCOUNTER — HOSPITAL ENCOUNTER (OUTPATIENT)
Dept: PHYSICAL THERAPY | Age: 54
Setting detail: THERAPIES SERIES
Discharge: HOME | End: 2019-09-25
Payer: COMMERCIAL

## 2019-09-25 NOTE — FLOWSHEET NOTE
Physical Therapy  Cancellation/No-show Note    Patient Name:  Geovanna Marroquin  :  1965   Date:  2019  Cancels to Date: 1  No-shows to Date: 0    For today's appointment patient:  [x]  Cancelled  []  Rescheduled appointment  []  No-show     Reason given by patient:  []  Patient ill  []  Conflicting appointment  []  No transportation    []  Conflict with work  []  No reason given  [x]  Other:     Comments:  Pt left message stating she's having back spasms and cannot come in     Electronically signed by:  Teresita Valdez PT

## 2019-10-04 ENCOUNTER — HOSPITAL ENCOUNTER (OUTPATIENT)
Dept: PHYSICAL THERAPY | Age: 54
Setting detail: THERAPIES SERIES
Discharge: HOME | End: 2019-10-04

## 2019-12-04 ENCOUNTER — HOSPITAL ENCOUNTER (EMERGENCY)
Age: 54
Discharge: HOME | End: 2019-12-04
Payer: COMMERCIAL

## 2019-12-04 ENCOUNTER — APPOINTMENT (OUTPATIENT)
Dept: GENERAL RADIOLOGY | Age: 54
End: 2019-12-04
Payer: COMMERCIAL

## 2019-12-04 VITALS
WEIGHT: 180 LBS | HEIGHT: 66 IN | OXYGEN SATURATION: 98 % | RESPIRATION RATE: 16 BRPM | SYSTOLIC BLOOD PRESSURE: 121 MMHG | DIASTOLIC BLOOD PRESSURE: 61 MMHG | BODY MASS INDEX: 28.93 KG/M2 | TEMPERATURE: 98.1 F | HEART RATE: 79 BPM

## 2019-12-04 DIAGNOSIS — S93.401A SPRAIN OF RIGHT ANKLE, UNSPECIFIED LIGAMENT, INITIAL ENCOUNTER: Primary | ICD-10-CM

## 2019-12-04 PROCEDURE — 73600 X-RAY EXAM OF ANKLE: CPT

## 2019-12-04 PROCEDURE — 99283 EMERGENCY DEPT VISIT LOW MDM: CPT

## 2019-12-04 PROCEDURE — 6370000000 HC RX 637 (ALT 250 FOR IP): Performed by: PHYSICIAN ASSISTANT

## 2019-12-04 RX ORDER — IBUPROFEN 800 MG/1
800 TABLET ORAL ONCE
Status: COMPLETED | OUTPATIENT
Start: 2019-12-04 | End: 2019-12-04

## 2019-12-04 RX ORDER — IBUPROFEN 800 MG/1
800 TABLET ORAL EVERY 8 HOURS PRN
Qty: 30 TABLET | Refills: 0 | Status: SHIPPED | OUTPATIENT
Start: 2019-12-04 | End: 2020-10-13 | Stop reason: DRUGHIGH

## 2019-12-04 RX ADMIN — IBUPROFEN 800 MG: 800 TABLET, FILM COATED ORAL at 11:36

## 2019-12-04 ASSESSMENT — PAIN DESCRIPTION - PAIN TYPE: TYPE: ACUTE PAIN

## 2019-12-04 ASSESSMENT — PAIN DESCRIPTION - FREQUENCY: FREQUENCY: CONTINUOUS

## 2019-12-04 ASSESSMENT — PAIN SCALES - GENERAL
PAINLEVEL_OUTOF10: 5
PAINLEVEL_OUTOF10: 5

## 2019-12-04 ASSESSMENT — ENCOUNTER SYMPTOMS
VOMITING: 0
NAUSEA: 0
COUGH: 0
SHORTNESS OF BREATH: 0

## 2019-12-04 ASSESSMENT — PAIN DESCRIPTION - DESCRIPTORS: DESCRIPTORS: ACHING;THROBBING

## 2019-12-04 ASSESSMENT — PAIN DESCRIPTION - ONSET: ONSET: ON-GOING

## 2019-12-04 ASSESSMENT — PAIN DESCRIPTION - LOCATION: LOCATION: ANKLE

## 2019-12-04 ASSESSMENT — PAIN DESCRIPTION - ORIENTATION: ORIENTATION: RIGHT

## 2019-12-10 ENCOUNTER — OFFICE VISIT (OUTPATIENT)
Dept: ORTHOPEDIC SURGERY | Age: 54
End: 2019-12-10
Payer: COMMERCIAL

## 2019-12-10 VITALS — BODY MASS INDEX: 28.91 KG/M2 | WEIGHT: 179.9 LBS | HEIGHT: 66 IN

## 2019-12-10 DIAGNOSIS — S93.401A SPRAIN OF RIGHT ANKLE, UNSPECIFIED LIGAMENT, INITIAL ENCOUNTER: Primary | ICD-10-CM

## 2019-12-10 PROCEDURE — 99243 OFF/OP CNSLTJ NEW/EST LOW 30: CPT | Performed by: ORTHOPAEDIC SURGERY

## 2019-12-12 ENCOUNTER — TELEPHONE (OUTPATIENT)
Dept: ORTHOPEDIC SURGERY | Age: 54
End: 2019-12-12

## 2019-12-12 RX ORDER — MELOXICAM 15 MG/1
15 TABLET ORAL DAILY
Qty: 30 TABLET | Refills: 0 | Status: SHIPPED | OUTPATIENT
Start: 2019-12-12 | End: 2019-12-13 | Stop reason: SDUPTHER

## 2019-12-13 ENCOUNTER — TELEPHONE (OUTPATIENT)
Dept: ORTHOPEDIC SURGERY | Age: 54
End: 2019-12-13

## 2019-12-13 DIAGNOSIS — S93.401A SPRAIN OF RIGHT ANKLE, UNSPECIFIED LIGAMENT, INITIAL ENCOUNTER: Primary | ICD-10-CM

## 2019-12-13 RX ORDER — MELOXICAM 15 MG/1
15 TABLET ORAL DAILY
Qty: 30 TABLET | Refills: 0 | Status: SHIPPED | OUTPATIENT
Start: 2019-12-13 | End: 2020-08-31 | Stop reason: ALTCHOICE

## 2019-12-20 ENCOUNTER — HOSPITAL ENCOUNTER (OUTPATIENT)
Dept: PHYSICAL THERAPY | Age: 54
Setting detail: THERAPIES SERIES
Discharge: HOME | End: 2019-12-20
Payer: COMMERCIAL

## 2019-12-20 PROCEDURE — 97110 THERAPEUTIC EXERCISES: CPT

## 2019-12-20 PROCEDURE — 97161 PT EVAL LOW COMPLEX 20 MIN: CPT

## 2019-12-20 PROCEDURE — 97140 MANUAL THERAPY 1/> REGIONS: CPT

## 2019-12-21 ENCOUNTER — HOSPITAL ENCOUNTER (OUTPATIENT)
Dept: MAMMOGRAPHY | Age: 54
Discharge: HOME | End: 2019-12-21
Payer: COMMERCIAL

## 2019-12-21 DIAGNOSIS — Z12.39 BREAST CANCER SCREENING: ICD-10-CM

## 2019-12-21 PROCEDURE — 77063 BREAST TOMOSYNTHESIS BI: CPT

## 2019-12-26 ENCOUNTER — HOSPITAL ENCOUNTER (OUTPATIENT)
Dept: PHYSICAL THERAPY | Age: 54
Setting detail: THERAPIES SERIES
Discharge: HOME | End: 2019-12-26
Payer: COMMERCIAL

## 2019-12-26 PROCEDURE — 97140 MANUAL THERAPY 1/> REGIONS: CPT

## 2019-12-26 PROCEDURE — 97110 THERAPEUTIC EXERCISES: CPT

## 2020-01-02 ENCOUNTER — HOSPITAL ENCOUNTER (OUTPATIENT)
Dept: PHYSICAL THERAPY | Age: 55
Setting detail: THERAPIES SERIES
Discharge: HOME | End: 2020-01-02
Payer: COMMERCIAL

## 2020-01-02 PROCEDURE — 97110 THERAPEUTIC EXERCISES: CPT

## 2020-01-02 PROCEDURE — 97140 MANUAL THERAPY 1/> REGIONS: CPT

## 2020-01-02 PROCEDURE — 97112 NEUROMUSCULAR REEDUCATION: CPT

## 2020-01-02 NOTE — FLOWSHEET NOTE
Outpatient Physical Therapy     [x] Daily Treatment Note   [] Progress Note   [] Discharge Note    Date:  1/2/2020    Patient Name:  Norman Atwood         YOB: 1965    Medical Diagnosis: Sprain of Right Ankle  ICD 10:  S93.401A  Treatment Diagnosis:   Ankle pain     Onset Date: Dec 4  Referral Date:  12/10/19  Referring Bebo/andrew:  Dr. Danilo Lorenzo     Visits Allowed/Insurance/Certification Information:  [address] [place] [address]    Restrictions/Precautions:  No restrictions     Pt's Occupation/Job Duties:  RN     Plan of care sent to provider:      [x]Co-signature    (attempts: 1[] 2 []3[])         Plan of care signed:      []Yes date:            [x]No      Progress Note covers period from (if applicable):    [x]NA    []From          To           Next Progress Note due:   1/20/20    Visit# / total visits:  3/8    Plan for Next Session:  Manual mobilization for ROM, progress strength, dynamic balance      Subjective:    Pt reports she is feeling good, continues to notice min swelling with sitting on high stool with legs dangling at work. Has been walking around her house barefoot without issue. Bibiana Francisco for f/u on 1/7/20. Pain level: 2/10 R ankle (soreness)  AT EVAL: 5/10      Objective:       Exercises:    Exercises in bold performed in department today. Items not bolded are carried forward from prior visits for continuity of the record.   Exercise/Equipment Resistance/Repetitions HEP Other comments   Stationary bike  L1 x6 min [] VC's and subjective hx taken   DF with RTB   3x10 [x] Band provided   Eversion with RTB  3x10 [x]    BAPS   -fwd/bkwd   -lateral   -CW/CCW circles   x30  x30  x30 [] Standing in // bars     Seated HR/TR 2x10 [x]      Long sitting gastroc stretch 3x30\" [x]      Standing HR/TR  2x15 [x] UE support     Step-Ups 6\" 2x10 [] UE support     []      [x]         []        []        []    Neuro Re-edu  []      Static stand on airex -feet together  2x60\" [] No UE support     SLS R 3x30\"

## 2020-01-07 ENCOUNTER — OFFICE VISIT (OUTPATIENT)
Dept: ORTHOPEDIC SURGERY | Age: 55
End: 2020-01-07
Payer: COMMERCIAL

## 2020-01-07 VITALS — WEIGHT: 179.9 LBS | BODY MASS INDEX: 28.91 KG/M2 | HEIGHT: 66 IN

## 2020-01-07 PROCEDURE — 99212 OFFICE O/P EST SF 10 MIN: CPT | Performed by: ORTHOPAEDIC SURGERY

## 2020-01-07 NOTE — PROGRESS NOTES
Subjective: Patient is here for follow-up of her Workmen's Comp. injury right ankle. 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 She has some pain in physical therapy and has been using an Aircast.  Objective: Physical exam shows minimal swelling over the lateral aspect of the right ankle anterior drawer and talar tilt show mild laxity. Strength is 4/5 in the dorsiflexion plantarflexion with no focal weakness. No midfoot instability mild antalgic gait tenderness over the ATFL to palpation as well as over the syndesmotic ligament imaging: 3 views of the right ankle show no obvious fracture no change in the position of the ankle mortise is well reduced  Assessment and plan: This patient is doing better.   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

## 2020-01-08 ENCOUNTER — HOSPITAL ENCOUNTER (OUTPATIENT)
Dept: PHYSICAL THERAPY | Age: 55
Setting detail: THERAPIES SERIES
Discharge: HOME | End: 2020-01-08
Payer: COMMERCIAL

## 2020-01-08 PROCEDURE — 97112 NEUROMUSCULAR REEDUCATION: CPT

## 2020-01-08 PROCEDURE — 97110 THERAPEUTIC EXERCISES: CPT

## 2020-01-08 PROCEDURE — 97140 MANUAL THERAPY 1/> REGIONS: CPT

## 2020-01-08 NOTE — FLOWSHEET NOTE
Outpatient Physical Therapy     [x] Daily Treatment Note     [x] Discharge Note    Date:  1/8/2020    Patient Name:  Melvi Eli         YOB: 1965    Medical Diagnosis: Sprain of Right Ankle  ICD 10:  S93.401A  Treatment Diagnosis:   Ankle pain     Onset Date: Dec 4  Referral Date:  12/10/19  Referring Bebo/andrew:  Dr. Samantha Bourgeois     Visits Allowed/Insurance/Certification Information:  [address] [place] [address]    Restrictions/Precautions:  No restrictions     Pt's Occupation/Job Duties:  RN     Plan of care sent to provider:      [x]Co-signature    (attempts: 1[] 2 []3[])         Plan of care signed:      []Yes date:            [x]No      Progress Note covers period from (if applicable):    [x]NA    []From          To           Next Progress Note due:   1/20/20    Visit# / total visits:  4/8    Plan for Next Session:  NA      Subjective:    Pt reports that she saw Ritu Ramos yesterday. Per pt, MD recommended to cancel the rest of her therapy appointments and cont with HEP at home. This will be her last visit. Copied from MD office visit dated 1/7/20. Dr. Carlos Monique and plan: This patient is doing better. 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 Is fearful of falling. Exercises:    Exercises in bold performed in department today. Items not bolded are carried forward from prior visits for continuity of the record.   Exercise/Equipment Resistance/Repetitions HEP Other comments   Stationary bike  L1 x6 min [] VC's and subjective hx taken   DF with RTB   3x10 [x] Band provided   Eversion with RTB  3x10 [x]    BAPS   -fwd/bkwd   -lateral   -CW/CCW circles   x30  x30  x30 [] Standing in // bars     Seated HR/TR 2x10 [x]      Long sitting gastroc stretch 3x30\" [x]      Standing HR/TR  2x15 [x] UE support     Step-Ups 6\" 2x10 [] UE support     []    Standing Gastroc stretch  30\" [x]    Standing soleus stretch   30\" [x]       []        []    Neuro Re-edu  []      Static stand on airex -feet together  2x60\" [] No UE support     SLS R firm surface  3x30\" [x] No UE support     SLS on R, compliant surface 3 x 30\" [x] Intermittent UE support     Dynamic star exercise for proprioception Forward, side , back /  Bilat  x5 [x]      Therapeutic Exercise/Home Exercise Program:   [id number]  Reassess for DC  HEP reviewed and revised. Education regarding   Use of aircast   Use of neoprene sleeve as progression from cast, per MD recommendations  All questions answered to pt satisfaction. Neuromuscular Re-Education:  x10 minutes  See chart above for details    Manual Therapy:  x10 minutes  Gentle manual PROM   Gentle Grade I, II Talocrural mobs, glides to promote ROM  TC joint distraction  Subtalar joint mobilization      Modalities: 0 minutes   Educated on applying ice to lateral ankle 20 min on/ 20 min off when noticing inc edema    Assessment/Treatment/Activity Tolerance:  Pt has been seen for 4 visits of PT. Eval date 12/20/19. Progressing well meeting 5/5 Short term goals and 3/5 Long term goals of therapy. Did not meet foal for strength due to cont 4/5 with inversion. Remains on limited duty at work. Discussed fear of falling. Advised pt to proceed with DC for now and cont HEP until follow up with Dr. Onel Patrick. If remains feeling unstable/ fearful, pt may discuss with MD and consider more advanced PT for gait and agility/proprioception. Pt VU.       Patients response to treatment:   [x]Patient tolerated treatment well []Patient limited by fatigue   []Patient limited by pain  []Patient limited by other medical complications       Goals:   Progress towards goals:  Goals established on 12/20/19. GOALS    Short Term Goals:   2  weeks Long Term Goals:  4   weeks   1). Establish HEP                        MET 1). Pt independent with HEP             MET   2). Pain 3 /10 or less                   MET 2). Pain  0-1/10 or less                      MET   3). Increase strength 1/3 grade   MET 3). Increase strength to 4+/5 or greater  MET, except inversion remains 4/5   4). Increase ROM 5 degrees       MET 4). ROM WNL                                    MET        5). Tolerate ADLs without increased pain  MET 5). Return to all ADLs without limitation - remains on limited duty at work   6).  6).        Prognosis: [x]Good   []Fair   []Poor    Patient Requires Follow-up: [x]No    Plan: [x]Discharge    Timed Code Treatment Minutes:  55 minutes  Total Treatment Minutes:  55 minutes    Workers Comp Time Stamp  []N/A   Time In: 14:05  Time Out: 1500    Electronically signed by:  JOHN Salomon, MPT, OMT-C [id number]

## 2020-01-09 ENCOUNTER — TELEPHONE (OUTPATIENT)
Dept: ORTHOPEDIC SURGERY | Age: 55
End: 2020-01-09

## 2020-01-09 NOTE — TELEPHONE ENCOUNTER
Ferny Chang was not completed at the time of the office visit 1/7/2020 and is now being requested by Scarlett Toney. .     Notified [address]/Dr. Francisco's clinic    Once notified the [employer] has been completed I will forward to [address] [place] [address]

## 2020-01-28 ENCOUNTER — OFFICE VISIT (OUTPATIENT)
Dept: ORTHOPEDIC SURGERY | Age: 55
End: 2020-01-28
Payer: COMMERCIAL

## 2020-01-28 VITALS — BODY MASS INDEX: 28.91 KG/M2 | HEIGHT: 66 IN | WEIGHT: 179.9 LBS

## 2020-01-28 PROCEDURE — 99212 OFFICE O/P EST SF 10 MIN: CPT | Performed by: ORTHOPAEDIC SURGERY

## 2020-02-25 ENCOUNTER — OFFICE VISIT (OUTPATIENT)
Dept: ORTHOPEDIC SURGERY | Age: 55
End: 2020-02-25
Payer: COMMERCIAL

## 2020-02-25 VITALS — WEIGHT: 179.9 LBS | HEIGHT: 66 IN | BODY MASS INDEX: 28.91 KG/M2

## 2020-02-25 PROCEDURE — 99212 OFFICE O/P EST SF 10 MIN: CPT | Performed by: ORTHOPAEDIC SURGERY

## 2020-03-30 ENCOUNTER — TELEPHONE (OUTPATIENT)
Dept: ORTHOPEDIC SURGERY | Age: 55
End: 2020-03-30

## 2020-03-30 NOTE — TELEPHONE ENCOUNTER
Pt has an appt for tomorrow to be released back to work without any restrictions on her [facility] claim. She is wanting to know if she can cancel her appt and have us send in an updated [employer] 14 releasing her?

## 2020-08-25 ENCOUNTER — EMPLOYEE WELLNESS (OUTPATIENT)
Dept: OTHER | Age: 55
End: 2020-08-25

## 2020-08-25 LAB
CHOLESTEROL, TOTAL: 173 MG/DL (ref 0–199)
GLUCOSE BLD-MCNC: 92 MG/DL (ref 70–99)
HDLC SERPL-MCNC: 38 MG/DL (ref 40–60)
LDL CHOLESTEROL CALCULATED: 112 MG/DL
TRIGL SERPL-MCNC: 113 MG/DL (ref 0–150)

## 2020-08-27 NOTE — PROGRESS NOTES
[facility]) Dermatology  Miguelito Aaron MD  [phone]      Lucius Parham  1965    50 y.o. female     Date of Visit: 2020    Last Visit: >3yrs    Chief Complaint: Lesions     History of Present Illness:  1. Here for mole check. No new moles. No moles changing in size, shape, color. None associated w/ pain, bleeding, pruritus.    -Uses SPF 30 sunscreen regularly     2. Concerned about persistent asymptomatic discoloration of shoulders    3. Few day hx minimally pruritic eruption of lower legs     Derm History:   -Cherry angiomas s/p Vbeam  2017    Review of Systems:  Constitutional: Reports general sense of well-being. Allergies: Reviewed and updated. Past Medical History, Surgical History, Medications and Allergies reviewed. Past Medical History:   Diagnosis Date    Difficult intubation     states she's been told twice this.     Endometriosis     Fibroid tumor     Nausea & vomiting     PONV (postoperative nausea and vomiting)     TMJ disease      Past Surgical History:   Procedure Laterality Date     SECTION      CHOLECYSTECTOMY  2013    laparoscopic    CYSTOSCOPY      DILATION AND CURETTAGE OF UTERUS      OTHER SURGICAL HISTORY  2018    SUCTION DILATATION AND CURETTAGE HYSTEROSCOPY,    STUART AND BSO  2018     Robotic assisted laparoscopic hysterectomy, Bilateral salpingoophorectomy, Minilaparotomy for uterine extraction         Allergies   Allergen Reactions    Iodides Other (See Comments)     IVP dye for cystoscopy - 30 years ago approximately -\"instant head congestion\"     Outpatient Medications Marked as Taking for the 20 encounter (Office Visit) with Miguelito Aaron MD   Medication Sig Dispense Refill    amoxicillin (AMOXIL) 500 MG capsule Take 500 mg by mouth 3 times daily      metroNIDAZOLE (FLAGYL) 500 MG tablet Take 500 mg by mouth 2 times daily      ibuprofen (ADVIL;MOTRIN) 800 MG tablet Take 1 tablet by mouth every 8 hours as needed for Pain 30 tablet 0    estradiol (ESTRACE) 1 MG tablet Take 1 mg by mouth daily      fexofenadine (ALLEGRA ALLERGY) 180 MG tablet Take 180 mg by mouth daily      ibuprofen (ADVIL;MOTRIN) 600 MG tablet Take 1 tablet by mouth every 6 hours as needed for Pain 30 tablet 1       Physical Examination     The following were examined and determined to be normal: Psych/Neuro, Scalp/hair, Head/face, Conjunctivae/eyelids, Gums/teeth/lips, Neck, Nails/digits and Genitalia/groin/buttocks. The following were examined and determined to be abnormal: Breast/axilla/chest, Abdomen, Back, RUE, LUE, RLE and LLE. -General: Well-appearing, NAD  1. Scattered on the trunk and extremities are multiple well-defined round and oval symmetric smoothly-bordered uniformly brown macules and papules. 2. Shoulders - multiple discrete and coalescing few mm round tan and whitish macules   3. Distal lower legs - several discrete few mm round smooth edematous erythematous papules    Assessment and Plan     1. Benign acquired melanocytic nevi  -Recommend monthly self skin exams   -Educated regarding the ABCDEs of melanoma detection   -Call for any new/changing moles or concerning lesions  -Reviewed sun protective behavior -- sun avoidance during the peak hours of the day, sun-protective clothing (including hat and sunglasses), sunscreen use (water resistant, broad spectrum, SPF at least 30, need for reapplication every 2 to 3 hours), avoidance of tanning beds   -Return for full skin exam in 1 year (sooner if indicated)     2. Solar lentigines  -Edu re: benignity, relationship w/ chronic cumulative sun exposure, darkening w/ unprotected sun exposure    3.  Arthropod bites  -Offered topical steroid but pt declined

## 2020-08-31 ENCOUNTER — OFFICE VISIT (OUTPATIENT)
Dept: DERMATOLOGY | Age: 55
End: 2020-08-31
Payer: COMMERCIAL

## 2020-08-31 VITALS — TEMPERATURE: 97.9 F

## 2020-08-31 PROCEDURE — 99203 OFFICE O/P NEW LOW 30 MIN: CPT | Performed by: DERMATOLOGY

## 2020-08-31 RX ORDER — METRONIDAZOLE 500 MG/1
500 TABLET ORAL 2 TIMES DAILY
COMMUNITY
End: 2020-10-13 | Stop reason: ALTCHOICE

## 2020-08-31 RX ORDER — AMOXICILLIN 500 MG/1
500 CAPSULE ORAL 3 TIMES DAILY
COMMUNITY
End: 2020-10-13 | Stop reason: ALTCHOICE

## 2020-10-12 NOTE — PATIENT INSTRUCTIONS
Laser Wound Care Instructions    Following the procedure, the treated areas may be red or appear bruised and will likely be swollen for a few hours or up to a few days. The affected areas should be treated delicately following treatment. Discomfort and swelling should be treated with the application of hourly cool compresses the evening of the procedure. Avoid applying ice directly to the skin. If your face was treated, you may want to sleep with your head elevated on an extra pillow to help minimize swelling. Improper care of the treated area while the discoloration is present may increase the chance of scarring or skin textural changes to the treated area. Please call the office if you have excessive discomfort, herpes simplex virus flare, or burns, blisters, or scabbing.

## 2020-10-13 ENCOUNTER — PROCEDURE VISIT (OUTPATIENT)
Dept: DERMATOLOGY | Age: 55
End: 2020-10-13

## 2020-10-13 VITALS — TEMPERATURE: 97.7 F

## 2020-10-13 PROCEDURE — DM01340 VBEAM LASER ANGIOMAS 6-15: Performed by: DERMATOLOGY

## 2020-10-19 VITALS — BODY MASS INDEX: 30.04 KG/M2 | WEIGHT: 186 LBS

## 2020-12-16 ENCOUNTER — NURSE TRIAGE (OUTPATIENT)
Dept: OTHER | Facility: CLINIC | Age: 55
End: 2020-12-16

## 2020-12-16 ENCOUNTER — HOSPITAL ENCOUNTER (EMERGENCY)
Age: 55
Discharge: HOME | End: 2020-12-17
Attending: EMERGENCY MEDICINE
Payer: COMMERCIAL

## 2020-12-16 PROCEDURE — 99284 EMERGENCY DEPT VISIT MOD MDM: CPT

## 2020-12-16 PROCEDURE — 80053 COMPREHEN METABOLIC PANEL: CPT

## 2020-12-16 PROCEDURE — 96374 THER/PROPH/DIAG INJ IV PUSH: CPT

## 2020-12-16 PROCEDURE — 36415 COLL VENOUS BLD VENIPUNCTURE: CPT

## 2020-12-16 PROCEDURE — 83690 ASSAY OF LIPASE: CPT

## 2020-12-16 PROCEDURE — 85025 COMPLETE CBC W/AUTO DIFF WBC: CPT

## 2020-12-16 ASSESSMENT — PAIN SCALES - GENERAL: PAINLEVEL_OUTOF10: 7

## 2020-12-16 ASSESSMENT — PAIN DESCRIPTION - LOCATION: LOCATION: BACK;CHEST

## 2020-12-16 ASSESSMENT — PAIN DESCRIPTION - DESCRIPTORS: DESCRIPTORS: ACHING;HEAVINESS

## 2020-12-16 ASSESSMENT — PAIN DESCRIPTION - PAIN TYPE: TYPE: ACUTE PAIN

## 2020-12-16 NOTE — LETTER
[facility] Emergency Department  [facility] [address]  Phone: [phone]               December 17, 2020    Patient: Martine Mendenhall   YOB: 1965   Date of Visit: 12/16/2020       To Whom It May Concern:    Martine Mendenhall was seen and treated in our emergency department on 12/16/2020  You can return to work/be with others after at least 10 days since symptoms first appeared (12/21/20) and at least 24 hours with no fever without fever reducing medication.     Sincerely,       Bennett Rojas DO         Signature:__________________________________

## 2020-12-17 ENCOUNTER — CARE COORDINATION (OUTPATIENT)
Dept: OTHER | Facility: CLINIC | Age: 55
End: 2020-12-17

## 2020-12-17 ENCOUNTER — APPOINTMENT (OUTPATIENT)
Dept: GENERAL RADIOLOGY | Age: 55
End: 2020-12-17
Payer: COMMERCIAL

## 2020-12-17 VITALS
RESPIRATION RATE: 16 BRPM | OXYGEN SATURATION: 99 % | HEIGHT: 66 IN | TEMPERATURE: 98.3 F | DIASTOLIC BLOOD PRESSURE: 72 MMHG | SYSTOLIC BLOOD PRESSURE: 110 MMHG | BODY MASS INDEX: 29.73 KG/M2 | HEART RATE: 63 BPM | WEIGHT: 185 LBS

## 2020-12-17 LAB
A/G RATIO: 1.4 (ref 1.1–2.2)
ALBUMIN SERPL-MCNC: 3.9 G/DL (ref 3.4–5)
ALP BLD-CCNC: 67 U/L (ref 40–129)
ALT SERPL-CCNC: 15 U/L (ref 10–40)
ANION GAP SERPL CALCULATED.3IONS-SCNC: 10 MMOL/L (ref 3–16)
AST SERPL-CCNC: 22 U/L (ref 15–37)
BACTERIA: ABNORMAL /HPF
BASOPHILS ABSOLUTE: 0 K/UL (ref 0–0.2)
BASOPHILS RELATIVE PERCENT: 0.3 %
BILIRUB SERPL-MCNC: 0.3 MG/DL (ref 0–1)
BILIRUBIN URINE: NEGATIVE
BLOOD, URINE: ABNORMAL
BUN BLDV-MCNC: 6 MG/DL (ref 7–20)
CALCIUM SERPL-MCNC: 8.8 MG/DL (ref 8.3–10.6)
CHLORIDE BLD-SCNC: 101 MMOL/L (ref 99–110)
CLARITY: ABNORMAL
CO2: 26 MMOL/L (ref 21–32)
COLOR: YELLOW
CREAT SERPL-MCNC: 0.8 MG/DL (ref 0.6–1.1)
EOSINOPHILS ABSOLUTE: 0 K/UL (ref 0–0.6)
EOSINOPHILS RELATIVE PERCENT: 0.2 %
EPITHELIAL CELLS, UA: ABNORMAL /HPF (ref 0–5)
GFR AFRICAN AMERICAN: >60
GFR NON-AFRICAN AMERICAN: >60
GLOBULIN: 2.8 G/DL
GLUCOSE BLD-MCNC: 105 MG/DL (ref 70–99)
GLUCOSE URINE: NEGATIVE MG/DL
HCT VFR BLD CALC: 39.8 % (ref 36–48)
HEMOGLOBIN: 13.2 G/DL (ref 12–16)
KETONES, URINE: NEGATIVE MG/DL
LEUKOCYTE ESTERASE, URINE: NEGATIVE
LIPASE: 32 U/L (ref 13–60)
LYMPHOCYTES ABSOLUTE: 0.9 K/UL (ref 1–5.1)
LYMPHOCYTES RELATIVE PERCENT: 31.8 %
MCH RBC QN AUTO: 30.4 PG (ref 26–34)
MCHC RBC AUTO-ENTMCNC: 33.1 G/DL (ref 31–36)
MCV RBC AUTO: 91.8 FL (ref 80–100)
MICROSCOPIC EXAMINATION: YES
MONOCYTES ABSOLUTE: 0.4 K/UL (ref 0–1.3)
MONOCYTES RELATIVE PERCENT: 12.8 %
NEUTROPHILS ABSOLUTE: 1.6 K/UL (ref 1.7–7.7)
NEUTROPHILS RELATIVE PERCENT: 54.9 %
NITRITE, URINE: NEGATIVE
PDW BLD-RTO: 13 % (ref 12.4–15.4)
PH UA: 6 (ref 5–8)
PLATELET # BLD: 134 K/UL (ref 135–450)
PMV BLD AUTO: 10.3 FL (ref 5–10.5)
POTASSIUM REFLEX MAGNESIUM: 4.2 MMOL/L (ref 3.5–5.1)
PROTEIN UA: NEGATIVE MG/DL
RBC # BLD: 4.34 M/UL (ref 4–5.2)
RBC UA: ABNORMAL /HPF (ref 0–4)
SODIUM BLD-SCNC: 137 MMOL/L (ref 136–145)
SPECIFIC GRAVITY UA: 1.01 (ref 1–1.03)
TOTAL PROTEIN: 6.7 G/DL (ref 6.4–8.2)
URINE REFLEX TO CULTURE: ABNORMAL
URINE TYPE: ABNORMAL
UROBILINOGEN, URINE: 0.2 E.U./DL
WBC # BLD: 2.9 K/UL (ref 4–11)
WBC UA: ABNORMAL /HPF (ref 0–5)

## 2020-12-17 PROCEDURE — 6360000002 HC RX W HCPCS: Performed by: EMERGENCY MEDICINE

## 2020-12-17 PROCEDURE — 87086 URINE CULTURE/COLONY COUNT: CPT

## 2020-12-17 PROCEDURE — 2580000003 HC RX 258: Performed by: EMERGENCY MEDICINE

## 2020-12-17 PROCEDURE — 87186 SC STD MICRODIL/AGAR DIL: CPT

## 2020-12-17 PROCEDURE — 87088 URINE BACTERIA CULTURE: CPT

## 2020-12-17 PROCEDURE — 81001 URINALYSIS AUTO W/SCOPE: CPT

## 2020-12-17 PROCEDURE — 71046 X-RAY EXAM CHEST 2 VIEWS: CPT

## 2020-12-17 RX ORDER — ONDANSETRON 4 MG/1
4 TABLET, ORALLY DISINTEGRATING ORAL EVERY 8 HOURS PRN
Qty: 12 TABLET | Refills: 0 | Status: ON HOLD | OUTPATIENT
Start: 2020-12-17 | End: 2020-12-25 | Stop reason: HOSPADM

## 2020-12-17 RX ORDER — 0.9 % SODIUM CHLORIDE 0.9 %
1000 INTRAVENOUS SOLUTION INTRAVENOUS ONCE
Status: COMPLETED | OUTPATIENT
Start: 2020-12-17 | End: 2020-12-17

## 2020-12-17 RX ORDER — BENZONATATE 100 MG/1
200 CAPSULE ORAL 3 TIMES DAILY PRN
Qty: 30 CAPSULE | Refills: 0 | Status: ON HOLD | OUTPATIENT
Start: 2020-12-17 | End: 2020-12-25 | Stop reason: HOSPADM

## 2020-12-17 RX ORDER — DEXTROMETHORPHAN HYDROBROMIDE AND PROMETHAZINE HYDROCHLORIDE 15; 6.25 MG/5ML; MG/5ML
5 SYRUP ORAL 4 TIMES DAILY PRN
Qty: 180 ML | Refills: 0 | Status: ON HOLD | OUTPATIENT
Start: 2020-12-17 | End: 2020-12-25 | Stop reason: HOSPADM

## 2020-12-17 RX ORDER — ACETAMINOPHEN 500 MG
500 TABLET ORAL EVERY 6 HOURS PRN
COMMUNITY
End: 2022-08-24

## 2020-12-17 RX ORDER — PSEUDOEPHEDRINE HCL 60 MG/1
60 TABLET ORAL EVERY 6 HOURS PRN
Qty: 20 TABLET | Refills: 0 | Status: ON HOLD | OUTPATIENT
Start: 2020-12-17 | End: 2020-12-25 | Stop reason: HOSPADM

## 2020-12-17 RX ORDER — ONDANSETRON 2 MG/ML
4 INJECTION INTRAMUSCULAR; INTRAVENOUS ONCE
Status: COMPLETED | OUTPATIENT
Start: 2020-12-17 | End: 2020-12-17

## 2020-12-17 RX ADMIN — SODIUM CHLORIDE 1000 ML: 9 INJECTION, SOLUTION INTRAVENOUS at 00:11

## 2020-12-17 RX ADMIN — ONDANSETRON HYDROCHLORIDE 4 MG: 2 INJECTION, SOLUTION INTRAMUSCULAR; INTRAVENOUS at 00:10

## 2020-12-17 NOTE — TELEPHONE ENCOUNTER
fever? \" If so, ask: \"What is your temperature, how was it measured, and when did it start? \"      Pt states Fever was 100.9 orally    7. RESPIRATORY STATUS: \"Describe your breathing? \" (e.g., shortness of breath, wheezing, unable to speak)       Pt states chest heaviness    8. BETTER-SAME-WORSE: Merla Soho you getting better, staying the same or getting worse compared to yesterday? \"  If getting worse, ask, \"In what way? \"     Pt states she is starting to feel worse today, symptoms are more worse     9. HIGH RISK DISEASE: \"Do you have any chronic medical problems? \" (e.g., asthma, heart or lung disease, weak immune system, obesity, etc.)      Pt denies    10. PREGNANCY: \"Is there any chance you are pregnant? \" \"When was your last menstrual period? \"Hysterectomy    11. OTHER SYMPTOMS: \"Do you have any other symptoms? \"  (e.g., chills, fatigue, headache, loss of smell or taste, muscle pain, sore throat; new loss of smell or taste especially support the diagnosis of COVID-19)        Chills, fever, body aches, cough, sore throat, nausea etc,    Protocols used: CORONAVIRUS (COVID-19) DIAGNOSED OR [facility]USPECT[facility]

## 2020-12-17 NOTE — CARE COORDINATION
[facility] ED Follow Up Call    2020    Patient: Mingo Donahue Patient : 1965   MRN: [id number]  Reason for Admission: Cough, Concern for Covid-19  Discharge Date: 2020           Patient contacted regarding COVID-19 exposure. Discussed COVID-19 related testing which was pending at this time. Test results were pending. Patient informed of results, if available? Pending- was tested Tuesday 12/15/2020. Care Transition Nurse/ Ambulatory Care Manager contacted the patient by telephone to perform post discharge assessment. Call within 2 business days of discharge: Yes. Verified name and  with patient as identifiers. Provided introduction to self, and explanation of the CTN/ACM role, and reason for call due to risk factors for infection and/or exposure to COVID-19. Symptoms reviewed with patient who verbalized the following symptoms: fever, fatigue, pain or aching joints, cough, chills or shaking, nausea and chest pain. Due to no new or worsening symptoms encounter was not routed to provider for escalation. Discussed follow-up appointments. If no appointment was previously scheduled, appointment scheduling offered: Yes- Patient will schedule virtual visit with PCP. [facility] follow up appointment(s): No future appointments. Non-face-to-face services provided:  Obtained and reviewed discharge summary and/or continuity of care documents  Reviewed and followed up on pending diagnostic tests and treatments-Pending Covid  Education of patient/family/caregiver/guardian to support self-management-Staying hydrated, rest  Assessment and support for treatment adherence and medication management-Has zofran PRN, Tylenol PRN, Tessalon and Promethazine cough syrup for cough. Advance Care Planning:   Does patient have an Advance Directive:  reviewed and current. Patient has following risk factors of: no known risk factors.  CTN/ACM reviewed discharge instructions, medical action plan and red flags such as increased shortness of breath, increasing fever and signs of decompensation with patient who verbalized understanding. Discussed exposure protocols and quarantine with CDC Guidelines What to do if you are sick with coronavirus disease 2019.  Patient was given an opportunity for questions and concerns. The patient agrees to contact the [facility] exposure line [phone], local health department [place] Department of Health: ([phone]) and PCP office for questions related to their healthcare. CTN/ACM provided contact information for future needs. Reviewed and educated patient on any new and changed medications related to discharge diagnosis     Patient/family/caregiver given information for [employer] Loop and agrees to enroll no  Patient's preferred e-mail:   Patient's preferred phone number:   Based on Loop alert triggers, patient will be contacted by nurse care manager for worsening symptoms. Patient returned home at [address] [place] [address], was sleeping when I called. She reports cough is persistent, dry. Intermittent fevers accompanied with myalgia (did not take temp today). Takes Tylenol when feels febrile. Plans to begin cough syrup and Tessalon today to help cough. She reported mild chest tightness with coughing. SOB is improving per pt. Adv to monitor for fever or worsening SOB and cough. Return to work date is pending, as she still awaits her Covid results and symptom improvement. Plan for follow-up call in 3-5 days based on severity of symptoms and risk factors. Marti Soto, [place]  Associate Care Manager  [phone] [facility])  [email or web address]

## 2020-12-17 NOTE — ED PROVIDER NOTES
CHIEF COMPLAINT  Concern For COVID-19 (daughter tested + Friday, pt was tested yesterday no results at this time)      HISTORY OF PRESENT ILLNESS  Carolina Pierre is a 50 y.o. female presents to the ED with concern that she has COVID-19 infection, she reports cough, congestion, loss of taste/smell, a little nausea, fever, initially had some chest tightness with coughing, no active chest pain currently, no vomiting, no significant bowel changes, reports she has been eating and drinking normally but seems like her urination is less frequent, no dysuria/hematuria that she was aware of, T-max was 100.9 but she does not think her thermometer is accurate, has felt like she had a fever for the past 5 days, reports her daughter tested positive for Covid at that time and she also developed symptoms, she is a nurse at [employer], she just got tested day before yesterday and is awaiting result, no significant shortness of breath or productive sputum but she is concerned that she could be developing pneumonia since she has had the fever for so long, she is also concerned that she is dehydrated and would like some fluids, has had some ear pressure/pain bilaterally, no significant sore throat. Has also had mild frontal headache, no other complaints, modifying factors or associated symptoms. I have reviewed the following from the nursing documentation. Past Medical History:   Diagnosis Date    Difficult intubation     states she's been told twice this.     Endometriosis     Fibroid tumor     Nausea & vomiting     PONV (postoperative nausea and vomiting)     TMJ disease      Past Surgical History:   Procedure Laterality Date     SECTION      CHOLECYSTECTOMY  2013    laparoscopic    CYSTOSCOPY      DILATION AND CURETTAGE OF UTERUS      OTHER SURGICAL HISTORY  2018    SUCTION DILATATION AND CURETTAGE HYSTEROSCOPY,    STUART AND BSO  2018     Robotic assisted laparoscopic hysterectomy, Bilateral salpingoophorectomy, Minilaparotomy for uterine extraction       Family History   Problem Relation Age of Onset    High Cholesterol Mother     Diabetes Father     Kidney Disease Father     High Cholesterol Father      Social History     Socioeconomic History    Marital status:      Spouse name: Not on file    Number of children: Not on file    Years of education: Not on file    Highest education level: Not on file   Occupational History    Not on file   Social Needs    Financial resource strain: Not on file    Food insecurity     Worry: Not on file     Inability: Not on file    Transportation needs     Medical: Not on file     Non-medical: Not on file   Tobacco Use    Smoking status: Never Smoker    Smokeless tobacco: Never Used   Substance and Sexual Activity    Alcohol use: No    Drug use: No    Sexual activity: Yes     Partners: Male   Lifestyle    Physical activity     Days per week: Not on file     Minutes per session: Not on file    Stress: Not on file   Relationships    Social connections     Talks on phone: Not on file     Gets together: Not on file     Attends Oriental orthodox service: Not on file     Active member of club or organization: Not on file     Attends meetings of clubs or organizations: Not on file     Relationship status: Not on file    Intimate partner violence     Fear of current or ex partner: Not on file     Emotionally abused: Not on file     Physically abused: Not on file     Forced sexual activity: Not on file   Other Topics Concern    Not on file   Social History Narrative    Not on file     No current facility-administered medications for this encounter.       Current Outpatient Medications   Medication Sig Dispense Refill    promethazine-dextromethorphan (PROMETHAZINE-DM) 6.25-15 MG/5ML syrup Take 5 mLs by mouth 4 times daily as needed for Cough (nausea) 180 mL 0    ondansetron (ZOFRAN ODT) 4 MG disintegrating tablet Take 1 tablet by mouth every 8 hours as needed for Nausea or Vomiting 12 tablet 0    benzonatate (TESSALON PERLES) 100 MG capsule Take 2 capsules by mouth 3 times daily as needed for Cough 30 capsule 0    pseudoephedrine (SUDAFED) 60 MG tablet Take 1 tablet by mouth every 6 hours as needed for Congestion 20 tablet 0    estradiol (ESTRACE) 1 MG tablet Take 1 mg by mouth daily      fexofenadine (ALLEGRA ALLERGY) 180 MG tablet Take 180 mg by mouth daily      ibuprofen (ADVIL;MOTRIN) 600 MG tablet Take 1 tablet by mouth every 6 hours as needed for Pain 30 tablet 1    meloxicam (MOBIC) 15 MG tablet Take 1 tablet by mouth daily 30 tablet 3     Allergies   Allergen Reactions    Iodides Other (See Comments)     IVP dye for cystoscopy - 30 years ago approximately -\"instant head congestion\"       REVIEW OF SYSTEMS  10 systems reviewed, pertinent positives per HPI otherwise noted to be negative. PHYSICAL EXAM  /72   Pulse 63   Temp 98.3 °F (36.8 °C) (Oral)   Resp 16   Ht 5' 6\" (1.676 m)   Wt 185 lb (83.9 kg)   LMP 08/14/2018 (Exact Date)   SpO2 99%   BMI 29.86 kg/m²   GENERAL APPEARANCE: Awake and alert. Cooperative. No acute distress  HEAD: Normocephalic. Atraumatic. EYES: PERRL. EOM's grossly intact. No conjunctival injection  ENT: Mucous membranes are moist.  Airway patent, no stridor, TMs without bulging/erythema/edema, no otorrhea or rhinorrhea, sounds a little congested, no oropharyngeal erythema/edema, no exudates, midline uvula  NECK: Supple. No rigidity  HEART: RRR. No murmurs  LUNGS: Respirations nonlabored. Lungs are clear to auscultation bilaterally after multiple deep breaths, initially heard a few crackles in the left base but presumed atelectatic since resolved. No rhonchi or wheezing  ABDOMEN: Soft. Non-distended. Non-tender. No guarding or rebound. Normal bowel sounds. EXTREMITIES: No peripheral edema. Moves all extremities equally. No calf tenderness  SKIN: Warm and dry. No acute rashes.    NEUROLOGICAL: Alert and oriented. No gross facial drooping. Normal speech, ambulates with steady gait  PSYCHIATRIC: Normal mood and affect. LABS  I have reviewed all labs for this visit.    Results for orders placed or performed during the hospital encounter of 12/16/20   CBC Auto Differential   Result Value Ref Range    WBC 2.9 (L) 4.0 - 11.0 K/uL    RBC 4.34 4.00 - 5.20 M/uL    Hemoglobin 13.2 12.0 - 16.0 g/dL    Hematocrit 39.8 36.0 - 48.0 %    MCV 91.8 80.0 - 100.0 fL    MCH 30.4 26.0 - 34.0 pg    MCHC 33.1 31.0 - 36.0 g/dL    RDW 13.0 12.4 - 15.4 %    Platelets 346 (L) 126 - 450 K/uL    MPV 10.3 5.0 - 10.5 fL    Neutrophils % 54.9 %    Lymphocytes % 31.8 %    Monocytes % 12.8 %    Eosinophils % 0.2 %    Basophils % 0.3 %    Neutrophils Absolute 1.6 (L) 1.7 - 7.7 K/uL    Lymphocytes Absolute 0.9 (L) 1.0 - 5.1 K/uL    Monocytes Absolute 0.4 0.0 - 1.3 K/uL    Eosinophils Absolute 0.0 0.0 - 0.6 K/uL    Basophils Absolute 0.0 0.0 - 0.2 K/uL   Comprehensive Metabolic Panel w/ Reflex to MG   Result Value Ref Range    Sodium 137 136 - 145 mmol/L    Potassium reflex Magnesium 4.2 3.5 - 5.1 mmol/L    Chloride 101 99 - 110 mmol/L    CO2 26 21 - 32 mmol/L    Anion Gap 10 3 - 16    Glucose 105 (H) 70 - 99 mg/dL    BUN 6 (L) 7 - 20 mg/dL    CREATININE 0.8 0.6 - 1.1 mg/dL    GFR Non-African American >60 >60    GFR African American >60 >60    Calcium 8.8 8.3 - 10.6 mg/dL    Total Protein 6.7 6.4 - 8.2 g/dL    Alb 3.9 3.4 - 5.0 g/dL    Albumin/Globulin Ratio 1.4 1.1 - 2.2    Total Bilirubin 0.3 0.0 - 1.0 mg/dL    Alkaline Phosphatase 67 40 - 129 U/L    ALT 15 10 - 40 U/L    AST 22 15 - 37 U/L    Globulin 2.8 g/dL   Urinalysis Reflex to Culture    Specimen: Urine, clean catch   Result Value Ref Range    Color, UA Yellow Straw/Yellow    Clarity, UA SL CLOUDY (A) Clear    Glucose, Ur Negative Negative mg/dL    Bilirubin Urine Negative Negative    Ketones, Urine Negative Negative mg/dL    Specific Gravity, UA 1.010 1.005 - 1.030    Blood, Urine SMALL (A) Negative    pH, UA 6.0 5.0 - 8.0    Protein, UA Negative Negative mg/dL    Urobilinogen, Urine 0.2 <2.0 E.U./dL    Nitrite, Urine Negative Negative    Leukocyte Esterase, Urine Negative Negative    Microscopic Examination YES     Urine Type NotGiven     Urine Reflex to Culture Not Indicated    Lipase   Result Value Ref Range    Lipase 32.0 13.0 - 60.0 U/L   Microscopic Urinalysis   Result Value Ref Range    WBC, UA 0-2 0 - 5 /HPF    RBC, UA 5-10 (A) 0 - 4 /HPF    Epithelial Cells, UA 2-5 0 - 5 /HPF    Bacteria, UA 3+ (A) None Seen /HPF         RADIOLOGY  Xr Chest (2 Vw)    Result Date: 12/17/2020  EXAMINATION: TWO XRAY VIEWS OF THE CHEST 12/17/2020 12:11 am COMPARISON: None. HISTORY: ORDERING SYSTEM PROVIDED HISTORY: cough, fever, likely covid, patient concern for PNA TECHNOLOGIST PROVIDED HISTORY: Reason for exam:->cough, fever, likely covid, patient concern for PNA Reason for Exam: cough, fever Acuity: Acute Type of Exam: Initial FINDINGS: No infiltrate or consolidation or effusion is identified. The heart size is normal.     Radiographically clear lungs. ED COURSE/MDM  Patient seen and evaluated. Old records reviewed. Labs and imaging reviewed and results discussed with patient.    61-year-old female who has likely COVID-19 infection, given work excuse if needed, given medications for symptom management, Zofran for nausea here, given fluids at patient's request, no evidence of significant dehydration based on labs, discussed her slightly low white blood cell count, encouraged repeat labs, she had not noticed any hematuria, but encouraged to get UA follow-up as well, no UTI symptoms so we will just send culture for the bacteriuria, she is able to tolerate p.o., we did discuss her risk of PE/DVT given her age/estrogen supplementation and likely COVID-19 infection, she is aware of signs to watch for, no evidence of this at this time, chest x-ray within normal limits, no evidence of pneumonia, patient is in no respiratory distress, vital signs stable, low suspicion for sepsis/meningitis/encephalitis, strict return precautions given, all questions answered, will return if any worsening symptoms or new concerns, see AVS for further discharge information, patient verbalized understanding of plan, felt comfortable going home. Orders Placed This Encounter   Procedures    Culture, Urine    XR CHEST (2 VW)    CBC Auto Differential    Comprehensive Metabolic Panel w/ Reflex to MG    Urinalysis Reflex to Culture    Lipase    Microscopic Urinalysis     Orders Placed This Encounter   Medications    0.9 % sodium chloride bolus    ondansetron (ZOFRAN) injection 4 mg    promethazine-dextromethorphan (PROMETHAZINE-DM) 6.25-15 MG/5ML syrup     Sig: Take 5 mLs by mouth 4 times daily as needed for Cough (nausea)     Dispense:  180 mL     Refill:  0    ondansetron (ZOFRAN ODT) 4 MG disintegrating tablet     Sig: Take 1 tablet by mouth every 8 hours as needed for Nausea or Vomiting     Dispense:  12 tablet     Refill:  0    benzonatate (TESSALON PERLES) 100 MG capsule     Sig: Take 2 capsules by mouth 3 times daily as needed for Cough     Dispense:  30 capsule     Refill:  0    pseudoephedrine (SUDAFED) 60 MG tablet     Sig: Take 1 tablet by mouth every 6 hours as needed for Congestion     Dispense:  20 tablet     Refill:  0          CLINICAL IMPRESSION  1. Cough    2. Suspected COVID-19 virus infection    3. Nausea    4. Decreased urination    5. Bacteriuria    6. Other microscopic hematuria        Blood pressure 110/72, pulse 63, temperature 98.3 °F (36.8 °C), temperature source Oral, resp. rate 16, height 5' 6\" (1.676 m), weight 185 lb (83.9 kg), last menstrual period 08/14/2018, SpO2 99 %, not currently breastfeeding. DISPOSITION  Janie Hudson was discharged to home in stable condition.                         Shakira Hunter, DO  12/17/20 [id number]

## 2020-12-19 ENCOUNTER — HOSPITAL ENCOUNTER (EMERGENCY)
Age: 55
Discharge: HOME | End: 2020-12-19
Attending: EMERGENCY MEDICINE
Payer: COMMERCIAL

## 2020-12-19 ENCOUNTER — APPOINTMENT (OUTPATIENT)
Dept: GENERAL RADIOLOGY | Age: 55
End: 2020-12-19
Payer: COMMERCIAL

## 2020-12-19 ENCOUNTER — NURSE TRIAGE (OUTPATIENT)
Dept: OTHER | Facility: CLINIC | Age: 55
End: 2020-12-19

## 2020-12-19 VITALS
RESPIRATION RATE: 18 BRPM | WEIGHT: 190 LBS | DIASTOLIC BLOOD PRESSURE: 72 MMHG | SYSTOLIC BLOOD PRESSURE: 98 MMHG | BODY MASS INDEX: 30.67 KG/M2 | TEMPERATURE: 98.2 F | HEART RATE: 72 BPM | OXYGEN SATURATION: 98 %

## 2020-12-19 LAB
A/G RATIO: 1.3 (ref 1.1–2.2)
ALBUMIN SERPL-MCNC: 3.5 G/DL (ref 3.4–5)
ALP BLD-CCNC: 72 U/L (ref 40–129)
ALT SERPL-CCNC: 22 U/L (ref 10–40)
ANION GAP SERPL CALCULATED.3IONS-SCNC: 7 MMOL/L (ref 3–16)
AST SERPL-CCNC: 29 U/L (ref 15–37)
BASOPHILS ABSOLUTE: 0 K/UL (ref 0–0.2)
BASOPHILS RELATIVE PERCENT: 0.2 %
BILIRUB SERPL-MCNC: 0.3 MG/DL (ref 0–1)
BUN BLDV-MCNC: 7 MG/DL (ref 7–20)
CALCIUM SERPL-MCNC: 9 MG/DL (ref 8.3–10.6)
CHLORIDE BLD-SCNC: 105 MMOL/L (ref 99–110)
CO2: 27 MMOL/L (ref 21–32)
CREAT SERPL-MCNC: 0.7 MG/DL (ref 0.6–1.1)
EKG ATRIAL RATE: 67 BPM
EKG DIAGNOSIS: NORMAL
EKG P AXIS: 52 DEGREES
EKG P-R INTERVAL: 144 MS
EKG Q-T INTERVAL: 394 MS
EKG QRS DURATION: 70 MS
EKG QTC CALCULATION (BAZETT): 416 MS
EKG R AXIS: 41 DEGREES
EKG T AXIS: 29 DEGREES
EKG VENTRICULAR RATE: 67 BPM
EOSINOPHILS ABSOLUTE: 0 K/UL (ref 0–0.6)
EOSINOPHILS RELATIVE PERCENT: 0.4 %
GFR AFRICAN AMERICAN: >60
GFR NON-AFRICAN AMERICAN: >60
GLOBULIN: 2.8 G/DL
GLUCOSE BLD-MCNC: 91 MG/DL (ref 70–99)
HCT VFR BLD CALC: 37.7 % (ref 36–48)
HEMOGLOBIN: 12.5 G/DL (ref 12–16)
LYMPHOCYTES ABSOLUTE: 0.6 K/UL (ref 1–5.1)
LYMPHOCYTES RELATIVE PERCENT: 27.4 %
MCH RBC QN AUTO: 30.4 PG (ref 26–34)
MCHC RBC AUTO-ENTMCNC: 33 G/DL (ref 31–36)
MCV RBC AUTO: 92.1 FL (ref 80–100)
MONOCYTES ABSOLUTE: 0.2 K/UL (ref 0–1.3)
MONOCYTES RELATIVE PERCENT: 10.8 %
NEUTROPHILS ABSOLUTE: 1.3 K/UL (ref 1.7–7.7)
NEUTROPHILS RELATIVE PERCENT: 61.2 %
ORGANISM: ABNORMAL
PDW BLD-RTO: 13.5 % (ref 12.4–15.4)
PLATELET # BLD: 110 K/UL (ref 135–450)
PMV BLD AUTO: 9.6 FL (ref 5–10.5)
POTASSIUM REFLEX MAGNESIUM: 4.2 MMOL/L (ref 3.5–5.1)
RBC # BLD: 4.09 M/UL (ref 4–5.2)
SODIUM BLD-SCNC: 139 MMOL/L (ref 136–145)
TOTAL PROTEIN: 6.3 G/DL (ref 6.4–8.2)
TROPONIN: <0.01 NG/ML
URINE CULTURE, ROUTINE: ABNORMAL
WBC # BLD: 2.1 K/UL (ref 4–11)

## 2020-12-19 PROCEDURE — 80053 COMPREHEN METABOLIC PANEL: CPT

## 2020-12-19 PROCEDURE — 93005 ELECTROCARDIOGRAM TRACING: CPT | Performed by: EMERGENCY MEDICINE

## 2020-12-19 PROCEDURE — 93010 ELECTROCARDIOGRAM REPORT: CPT | Performed by: INTERNAL MEDICINE

## 2020-12-19 PROCEDURE — 84484 ASSAY OF TROPONIN QUANT: CPT

## 2020-12-19 PROCEDURE — 2580000003 HC RX 258: Performed by: EMERGENCY MEDICINE

## 2020-12-19 PROCEDURE — 36415 COLL VENOUS BLD VENIPUNCTURE: CPT

## 2020-12-19 PROCEDURE — 71045 X-RAY EXAM CHEST 1 VIEW: CPT

## 2020-12-19 PROCEDURE — 99284 EMERGENCY DEPT VISIT MOD MDM: CPT

## 2020-12-19 PROCEDURE — 85025 COMPLETE CBC W/AUTO DIFF WBC: CPT

## 2020-12-19 RX ORDER — 0.9 % SODIUM CHLORIDE 0.9 %
1000 INTRAVENOUS SOLUTION INTRAVENOUS ONCE
Status: COMPLETED | OUTPATIENT
Start: 2020-12-19 | End: 2020-12-19

## 2020-12-19 RX ORDER — ASPIRIN 81 MG/1
81 TABLET ORAL DAILY
Qty: 30 TABLET | Refills: 0 | Status: SHIPPED | OUTPATIENT
Start: 2020-12-19 | End: 2022-01-07 | Stop reason: CLARIF

## 2020-12-19 RX ORDER — AZITHROMYCIN 250 MG/1
250 TABLET, FILM COATED ORAL SEE ADMIN INSTRUCTIONS
Qty: 6 TABLET | Refills: 0 | Status: ON HOLD | OUTPATIENT
Start: 2020-12-19 | End: 2020-12-25 | Stop reason: HOSPADM

## 2020-12-19 RX ADMIN — SODIUM CHLORIDE 1000 ML: 9 INJECTION, SOLUTION INTRAVENOUS at 14:06

## 2020-12-19 ASSESSMENT — ENCOUNTER SYMPTOMS
VOMITING: 0
NAUSEA: 0
BACK PAIN: 0
ABDOMINAL PAIN: 0
DIARRHEA: 0
CONSTIPATION: 0
COUGH: 1
SHORTNESS OF BREATH: 1
SORE THROAT: 0

## 2020-12-19 ASSESSMENT — PAIN SCALES - GENERAL
PAINLEVEL_OUTOF10: 8
PAINLEVEL_OUTOF10: 6

## 2020-12-19 ASSESSMENT — PAIN DESCRIPTION - LOCATION
LOCATION: CHEST
LOCATION: CHEST

## 2020-12-19 ASSESSMENT — PAIN DESCRIPTION - PAIN TYPE: TYPE: ACUTE PAIN

## 2020-12-19 ASSESSMENT — PAIN DESCRIPTION - DESCRIPTORS
DESCRIPTORS: ACHING
DESCRIPTORS: ACHING

## 2020-12-19 NOTE — TELEPHONE ENCOUNTER
Reason for Disposition   SEVERE or constant chest pain or pressure (Exception: mild central chest pain, present only when coughing)    Answer Assessment - Initial Assessment Questions  1. COVID-19 DIAGNOSIS: \"Who made your Coronavirus (COVID-19) diagnosis? \" \"Was it confirmed by a positive lab test?\" If not diagnosed by a HCP, ask \"Are there lots of cases (community spread) where you live? \" (See public health department website, if unsure)      Diagnosed yesterday    2. COVID-19 EXPOSURE: \"Was there any known exposure to COVID before the symptoms began? \" CDC Definition of close contact: within 6 feet (2 meters) for a total of 15 minutes or more over a 24-hour period. Exposed     3. ONSET: \"When did the COVID-19 symptoms start? \"         Last Friday started, cough changed     4. WORST SYMPTOM: \"What is your worst symptom? \" (e.g., cough, fever, shortness of breath, muscle aches)      Cough - gave tesselon pearls and codeine cough med and not helping    5. COUGH: \"Do you have a cough? \" If so, ask: \"How bad is the cough? \"        Started wet in lungs, changed to dry, and now has increased and is making chest hurt     6. FEVER: \"Do you have a fever? \" If so, ask: \"What is your temperature, how was it measured, and when did it start? \"      Still has temp     7. RESPIRATORY STATUS: \"Describe your breathing? \" (e.g., shortness of breath, wheezing, unable to speak)          \"I feel like im breathing harder\"    8. BETTER-SAME-WORSE: Inder Monroees you getting better, staying the same or getting worse compared to yesterday? \"  If getting worse, ask, \"In what way? \"         Worse     9. HIGH RISK DISEASE: \"Do you have any chronic medical problems? \" (e.g., asthma, heart or lung disease, weak immune system, obesity, etc.)          Denies    10. PREGNANCY: \"Is there any chance you are pregnant? \" \"When was your last menstrual period? \"          11. OTHER SYMPTOMS: \"Do you have any other symptoms? \"  (e.g., chills, fatigue, headache, loss of smell or taste, muscle pain, sore throat; new loss of smell or taste especially support the diagnosis of COVID-19)        All symptoms except vomiting and diarrhea    Protocols used: CORONAVIRUS (COVID-19) DIAGNOSED OR SUSPECTED-ADULT-AH    Patient called pre-service center [facility]) Patricia with red flag complaint. Brief description of triage: see above     Triage indicates for patient to go to ED for constant chest heaviness and moderate SOB. Pt was tested positive for COVID and states these symptoms have worsened or changed since being diagnosed. Pt verbalized understanding and is agreeable to plan. Care advice provided, patient verbalizes understanding; denies any other questions or concerns; instructed to call back for any new or worsening symptoms. Attention Provider: Thank you for allowing me to participate in the care of your patient. The patient was connected to triage in response to information provided to the [facility]. Please do not respond through this encounter as the response is not directed to a shared pool.

## 2020-12-19 NOTE — ED PROVIDER NOTES
pallor and rash. Neurological: Positive for headaches. Negative for light-headedness. All other systems reviewed and are negative. Except as noted above the remainder of the review of systems was reviewed and negative. PAST MEDICAL HISTORY     Past Medical History:   Diagnosis Date    Difficult intubation     states she's been told twice this.     Endometriosis     Fibroid tumor     Nausea & vomiting     PONV (postoperative nausea and vomiting)     TMJ disease          SURGICAL HISTORY       Past Surgical History:   Procedure Laterality Date     SECTION      CHOLECYSTECTOMY  2013    laparoscopic    CYSTOSCOPY      DILATION AND CURETTAGE OF UTERUS      OTHER SURGICAL HISTORY  2018    SUCTION DILATATION AND CURETTAGE HYSTEROSCOPY,    STUART AND BSO  2018     Robotic assisted laparoscopic hysterectomy, Bilateral salpingoophorectomy, Minilaparotomy for uterine extraction           CURRENT MEDICATIONS       Discharge Medication List as of 2020  3:58 PM      CONTINUE these medications which have NOT CHANGED    Details   promethazine-dextromethorphan (PROMETHAZINE-DM) 6.25-15 MG/5ML syrup Take 5 mLs by mouth 4 times daily as needed for Cough (nausea), Disp-180 mL, R-0Print      ondansetron (ZOFRAN ODT) 4 MG disintegrating tablet Take 1 tablet by mouth every 8 hours as needed for Nausea or Vomiting, Disp-12 tablet, R-0Print      benzonatate (TESSALON PERLES) 100 MG capsule Take 2 capsules by mouth 3 times daily as needed for Cough, Disp-30 capsule, R-0Print      pseudoephedrine (SUDAFED) 60 MG tablet Take 1 tablet by mouth every 6 hours as needed for Congestion, Disp-20 tablet, R-0Print      acetaminophen (TYLENOL) 500 MG tablet Take 500 mg by mouth every 6 hours as needed for Pain or FeverHistorical Med      estradiol (ESTRACE) 1 MG tablet Take 1 mg by mouth dailyHistorical Med      fexofenadine (ALLEGRA ALLERGY) 180 MG tablet Take 180 mg by mouth dailyHistorical Med ibuprofen (ADVIL;MOTRIN) 600 MG tablet Take 1 tablet by mouth every 6 hours as needed for Pain, Disp-30 tablet, R-1Print      meloxicam (MOBIC) 15 MG tablet Take 1 tablet by mouth daily, Disp-30 tablet, R-3Normal             ALLERGIES     Iodides    FAMILY HISTORY       Family History   Problem Relation Age of Onset    High Cholesterol Mother     Diabetes Father     Kidney Disease Father     High Cholesterol Father           SOCIAL HISTORY       Social History     Socioeconomic History    Marital status:      Spouse name: None    Number of children: None    Years of education: None    Highest education level: None   Occupational History    None   Social Needs    Financial resource strain: None    Food insecurity     Worry: None     Inability: None    Transportation needs     Medical: None     Non-medical: None   Tobacco Use    Smoking status: Never Smoker    Smokeless tobacco: Never Used   Substance and Sexual Activity    Alcohol use: No    Drug use: No    Sexual activity: Yes     Partners: Male   Lifestyle    Physical activity     Days per week: None     Minutes per session: None    Stress: None   Relationships    Social connections     Talks on phone: None     Gets together: None     Attends Jainism service: None     Active member of club or organization: None     Attends meetings of clubs or organizations: None     Relationship status: None    Intimate partner violence     Fear of current or ex partner: None     Emotionally abused: None     Physically abused: None     Forced sexual activity: None   Other Topics Concern    None   Social History Narrative    None       SCREENINGS    [place] Coma Scale  Eye Opening: Spontaneous  Best Verbal Response: Oriented  Best Motor Response: Obeys commands  [place] Coma Scale Score: 15          PHYSICAL EXAM    (up to 7 for level 4, 8 or more for level 5)     ED Triage Vitals [12/19/20 1334]   BP Temp Temp Source Pulse Resp SpO2 Height Weight [facility]  [address] [place],  [place] [address]   Phone [phone]   COMPREHENSIVE METABOLIC PANEL W/ REFLEX TO MG FOR LOW K - Abnormal; Notable for the following components: Total Protein 6.3 (*)     All other components within normal limits    Narrative:     Performed at:  [facility]. [facility]  [address] [place],  [place] [address], [employer] [address]   Phone [phone]   TROPONIN    Narrative:     Performed at:  [facility]. [facility]  [address],  [place] [address], [address]   Phone [phone]       All other labs were within normal range or not returned as of this dictation. EMERGENCY DEPARTMENT COURSE and DIFFERENTIAL DIAGNOSIS/MDM:   Vitals:    Vitals:    12/19/20 1334 12/19/20 1450 12/19/20 1605   BP: 109/64 99/68 98/72   Pulse: 71 68 72   Resp: 20 18    Temp: 98.2 °F (36.8 °C)     TempSrc: Oral     SpO2: 99% 99% 98%   Weight: 190 lb (86.2 kg)         Patient evaluated and previous record reviewed. Patient presents with worsening chest heaviness, shortness of breath, and cough with continued fever in the setting of known COVID-19 infection. Vital signs stable and within normal limits. Physical exam as documented above. Lab work-up notable for leukopenia. Chest x-ray concerning for infiltrates in the left mid to lower lung. As this follows a lobar pattern rather than the multifocal infiltrates we will go ahead and treat patient with an antibiotic. Place patient on a Z-Marcio. Advised her to start taking a baby aspirin. Prescribed her an at home pulse ox to monitor her symptoms. Counseled her on at home care instructions, expectant management, and return precautions. Patient voiced understanding was amenable with this plan. Patient discharged home. CONSULTS:  None    PROCEDURES:  Unless otherwise noted below, none     Procedures      FINAL IMPRESSION      1. COVID-19    2.  Pneumonia due to organism    3. Shortness of breath          DISPOSITION/PLAN   DISPOSITION        PATIENT REFERRED TO:  DO Socorro Cornelius   [phone]    Schedule an appointment as soon as possible for a visit         DISCHARGE MEDICATIONS:  Discharge Medication List as of 12/19/2020  3:58 PM      START taking these medications    Details   aspirin EC 81 MG EC tablet Take 1 tablet by mouth daily, Disp-30 tablet, R-0Print      azithromycin (ZITHROMAX) 250 MG tablet Take 1 tablet by mouth See Admin Instructions for 5 days 500mg on day 1 followed by 250mg on days 2 - 5, Disp-6 tablet, R-0Print           Controlled Substances Monitoring:     No flowsheet data found.     (Please note that portions of this note were completed with a voice recognition program.  Efforts were made to edit the dictations but occasionally words are mis-transcribed.)    German Redman MD (electronically signed)  Attending Emergency Physician            Lucy Devine MD  12/19/20 1676

## 2020-12-19 NOTE — ED TRIAGE NOTES
Covid +. tested Tuesday , notified positive yesterday. Symptoms since last Friday. States that she was seen at [facility] but the chest heaviness has become worse today. +fevers.  Medicated at 1130 tylenol and motrin

## 2020-12-19 NOTE — ED NOTES
No needs at this time. Awaiting results ivf infusing per order.       Oswaldo Black, RN  12/19/20 6792

## 2020-12-20 ENCOUNTER — CARE COORDINATION (OUTPATIENT)
Dept: CASE MANAGEMENT | Age: 55
End: 2020-12-20

## 2020-12-20 NOTE — CARE COORDINATION
Patient contacted regarding [id number] diagnosis\". Discussed COVID-19 related testing which was available at this time. Test results were positive. Patient informed of results, if available? Yes    Care Transition Nurse/ Ambulatory Care Manager contacted the patient by telephone to perform post discharge assessment. Call within 2 business days of discharge: Yes. Verified name and  with patient as identifiers. Provided introduction to self, and explanation of the CTN/ACM role, and reason for call due to risk factors for infection and/or exposure to COVID-19. Symptoms reviewed with patient who verbalized the following symptoms: shortness of breath. Due to no new or worsening symptoms encounter was not routed to provider for escalation. Discussed follow-up appointments. If no appointment was previously scheduled, appointment scheduling offered: Yes  [facility] follow up appointment(s): No future appointments. Advised to call PCP for appt. Explained that a virtual visit could possibly be obtained    Non-face-to-face services provided:  Education of patient/family/caregiver/guardian to support self-management-educated that lukewarm showers and wash cloths inaddition to fluids and medications are helpful. Also educated on salt water gargle     Advance Care Planning:   Does patient have an Advance Directive:  not on file. Patient has following risk factors of: pneumonia. CTN/ACM reviewed discharge instructions, medical action plan and red flags such as increased shortness of breath, increasing fever and signs of decompensation with patient who verbalized understanding. Discussed exposure protocols and quarantine with CDC Guidelines What to do if you are sick with coronavirus disease 2019.  Patient was given an opportunity for questions and concerns.  The patient agrees to contact the [facility] exposure line [phone], [facility] department [place] Department of Health: ([phone]) and PCP office for questions related to their healthcare. CTN/ACM provided contact information for future needs. Reviewed and educated patient on any new and changed medications related to discharge diagnosis     Patient/family/caregiver given information for GetWell Loop and agrees to enroll yes  Patient's preferred e-mail: [email or web address]   Patient's preferred phone [phone]  Based on Loop alert triggers, patient will be contacted by nurse care manager for worsening symptoms. Pt will be further monitored by COVID Loop Team based on severity of symptoms and risk factors.

## 2020-12-21 ENCOUNTER — CARE COORDINATION (OUTPATIENT)
Dept: OTHER | Facility: CLINIC | Age: 55
End: 2020-12-21

## 2020-12-21 ENCOUNTER — NURSE TRIAGE (OUTPATIENT)
Dept: OTHER | Facility: CLINIC | Age: 55
End: 2020-12-21

## 2020-12-21 ENCOUNTER — CARE COORDINATION (OUTPATIENT)
Dept: CARE COORDINATION | Age: 55
End: 2020-12-21

## 2020-12-21 LAB — SARS-COV-2: POSITIVE

## 2020-12-21 NOTE — TELEPHONE ENCOUNTER
Patient called in via the [address] [place] [address] to report symptoms of breathing difficulty worsening since being diagnosed with covid and pneumonia. Rates breathing difficulty as moderate. Describes feeling short of breath even at rest. Reports oxygen saturation has been around 93-94% today. Denies severe breathing difficulty at this time. Informed of disposition. Caller provided care advice and instructed to call back with worsening symptoms. Verbalized understanding and denies any further questions/concerns. Attention provider: Your patient utilized nurse triage services offered by employer, payer or community. This encounter includes an overview of the reason for call, assessment and recommended disposition. Please do not respond through this encounter as the response is not directed to a shared pool. Reason for Disposition   MODERATE difficulty breathing (e.g., speaks in phrases, SOB even at rest, pulse 100-120)    Answer Assessment - Initial Assessment Questions  1. COVID-19 DIAGNOSIS: \"Who made your Coronavirus (COVID-19) diagnosis? \" \"Was it confirmed by a positive lab test?\" If not diagnosed by a HCP, ask \"Are there lots of cases (community spread) where you live? \" (See public health department website, if unsure)      Yes-positive test    2. COVID-19 EXPOSURE: \"Was there any known exposure to COVID before the symptoms began? \" CDC Definition of close contact: within 6 feet (2 meters) for a total of 15 minutes or more over a 24-hour period. Yes    3. ONSET: \"When did the COVID-19 symptoms start?\"       12/11/20    4. WORST SYMPTOM: \"What is your worst symptom? \" (e.g., cough, fever, shortness of breath, muscle aches)      Shortness of breath    5. COUGH: \"Do you have a cough? \" If so, ask: \"How bad is the cough? \"        Moderate    6. FEVER: \"Do you have a fever? \" If so, ask: \"What is your temperature, how was it measured, and when did it start? \"      Chilling now    7.  RESPIRATORY STATUS: \"Describe your breathing? \" (e.g., shortness of breath, wheezing, unable to speak)       Short of breath at rest    8. BETTER-SAME-WORSE: Lise Herrmann you getting better, staying the same or getting worse compared to yesterday? \"  If getting worse, ask, \"In what way? \"      Worse-breathing    9. HIGH RISK DISEASE: \"Do you have any chronic medical problems? \" (e.g., asthma, heart or lung disease, weak immune system, obesity, etc.)      No    10. PREGNANCY: \"Is there any chance you are pregnant? \" \"When was your last menstrual period? \"        No    11. OTHER SYMPTOMS: \"Do you have any other symptoms? \"  (e.g., chills, fatigue, headache, loss of smell or taste, muscle pain, sore throat; new loss of smell or taste especially support the diagnosis of COVID-19)    Protocols used: CORONAVIRUS (COVID-19) DIAGNOSED OR SUSPECTED-[facility]

## 2020-12-21 NOTE — CARE COORDINATION
Call to pt to offer assistance with scheduling follow up appointment with PCP, Dr. Nicolette Yarbrough due to being discharged on 12/19 from [facility] with order for pulse oximeter. Left HIPAA compliant message on machine requesting a return call. If no return call, will attempt to reach pt again.     Dena Velasquez  [phone]

## 2020-12-21 NOTE — CARE COORDINATION
[facility] ED Follow Up Call    2020    Patient: Daiana Landa Patient : 1965   MRN: [id number]  Reason for Admission: Covid-19, SOB  Discharge Date: 2020 and 2020      Patient contacted regarding COVID-19 risk and screening. Discussed COVID-19 related testing which was available at this time. Test results were positive. Patient informed of results, if available? Yes. Care Transition Nurse/ Ambulatory Care Manager contacted the patient by telephone to perform follow-up assessment. Verified name and  with patient as identifiers. Patient has following risk factors of: no known risk factors. Symptoms reviewed with patient who verbalized the following symptoms: fever, fatigue, cough, shortness of breath, nausea, chest pain and dizziness/lightheadedness. Due to worsening symptoms encounter was routed to provider for escalation. Encouraged patient to schedule a virtual appointment with her PCP. Patient stated her cough worsened over the weekend, causing chest pressure/pain and increased WOB. Continued with intermittent fevers Friday into Saturday. Presented back to the ED 2020, dx PNA secondary to Covid-19. Cough is persistent and dry. Was given DME order for a pulse ox. O2 sats range from 93-97% while at rest, she states she is unsure what sats are when standing or mild exertion. Headaches are intermittent. C/o extreme nausea and very low appetite. C/o chills, sweats, fatigue. She is trying to stay hydrated, urinating at least Q 4-6 hours. Leukopenia was notable on CBC. Began on Z-rhona and ASA 81mg on Saturday. She is also taking Tessalon, Promethazine DM, and Tylenol PRN. Discussed monitoring oxygen while at home, breathing techniques, and importance of staying hydrated. Encouraged rest but making sure she is getting up every now and then to walk around as tolerated.     Patient advised to schedule VV with her PCP within the next 1-2 days.    Education provided regarding infection prevention, and signs and symptoms of COVID-19 and when to seek medical attention with patient who verbalized understanding. Discussed exposure protocols and quarantine from [address] you at higher risk for severe illness 2019 and given an opportunity for questions and concerns. The patient agrees to contact the COVID-19 hotline [phone] or PCP office for questions related to their healthcare. CTN/ACM provided contact information for future reference. From CDC: Are you at higher risk for severe illness?  Wash your hands often.  Avoid close contact (6 feet, which is about two arm lengths) with people who are sick.  Put distance between yourself and other people if COVID-19 is spreading in your community.  Clean and disinfect frequently touched surfaces.  Avoid all cruise travel and non-essential air travel.  Call your healthcare professional if you have concerns about COVID-19 and your underlying condition or if you are sick. For more information on steps you can take to protect yourself, see CDC's How to Lizzeth for follow-up call in 1-2 days based on severity of symptoms and risk factors. Marti Roman [facility]  Associate Care Manager  [phone] [facility])  [email or web address]

## 2020-12-22 NOTE — CARE COORDINATION
Call to pt to offer assistance with scheduling follow up appointment with PCP, Dr. Celestina Quesada due to being discharged on 12/19 from [facility] with order for pulse oximeter. Left another HIPAA compliant message on machine requesting a return call. No further attempts will be made, will wait for a return call.     Michele Marcelo  [phone]

## 2020-12-23 ENCOUNTER — CARE COORDINATION (OUTPATIENT)
Dept: OTHER | Facility: CLINIC | Age: 55
End: 2020-12-23

## 2020-12-23 ENCOUNTER — HOSPITAL ENCOUNTER (INPATIENT)
Age: 55
LOS: 3 days | Discharge: HOME | DRG: 177 | End: 2020-12-26
Attending: INTERNAL MEDICINE | Admitting: INTERNAL MEDICINE
Payer: COMMERCIAL

## 2020-12-23 PROBLEM — U07.1 PNEUMONIA DUE TO COVID-19 VIRUS: Status: ACTIVE | Noted: 2020-12-23

## 2020-12-23 PROBLEM — J12.82 PNEUMONIA DUE TO COVID-19 VIRUS: Status: ACTIVE | Noted: 2020-12-23

## 2020-12-23 PROCEDURE — 1200000000 HC SEMI PRIVATE

## 2020-12-23 PROCEDURE — 2700000000 HC OXYGEN THERAPY PER DAY

## 2020-12-23 PROCEDURE — 2580000003 HC RX 258: Performed by: PHYSICIAN ASSISTANT

## 2020-12-23 PROCEDURE — 94761 N-INVAS EAR/PLS OXIMETRY MLT: CPT

## 2020-12-23 PROCEDURE — XW033E5 INTRODUCTION OF REMDESIVIR ANTI-INFECTIVE INTO PERIPHERAL VEIN, PERCUTANEOUS APPROACH, NEW TECHNOLOGY GROUP 5: ICD-10-PCS | Performed by: INTERNAL MEDICINE

## 2020-12-23 PROCEDURE — 6360000002 HC RX W HCPCS: Performed by: PHYSICIAN ASSISTANT

## 2020-12-23 PROCEDURE — 2060000000 HC ICU INTERMEDIATE R&B

## 2020-12-23 RX ORDER — SODIUM CHLORIDE 0.9 % (FLUSH) 0.9 %
10 SYRINGE (ML) INJECTION EVERY 12 HOURS SCHEDULED
Status: DISCONTINUED | OUTPATIENT
Start: 2020-12-23 | End: 2020-12-26 | Stop reason: HOSPADM

## 2020-12-23 RX ORDER — GUAIFENESIN/DEXTROMETHORPHAN 100-10MG/5
5 SYRUP ORAL EVERY 4 HOURS PRN
Status: DISCONTINUED | OUTPATIENT
Start: 2020-12-23 | End: 2020-12-26 | Stop reason: HOSPADM

## 2020-12-23 RX ORDER — ACETAMINOPHEN 650 MG/1
650 SUPPOSITORY RECTAL EVERY 6 HOURS PRN
Status: DISCONTINUED | OUTPATIENT
Start: 2020-12-23 | End: 2020-12-26 | Stop reason: HOSPADM

## 2020-12-23 RX ORDER — PROMETHAZINE HYDROCHLORIDE 25 MG/1
12.5 TABLET ORAL EVERY 6 HOURS PRN
Status: DISCONTINUED | OUTPATIENT
Start: 2020-12-23 | End: 2020-12-26 | Stop reason: HOSPADM

## 2020-12-23 RX ORDER — POLYETHYLENE GLYCOL 3350 17 G/17G
17 POWDER, FOR SOLUTION ORAL DAILY PRN
Status: DISCONTINUED | OUTPATIENT
Start: 2020-12-23 | End: 2020-12-26 | Stop reason: HOSPADM

## 2020-12-23 RX ORDER — SODIUM CHLORIDE 0.9 % (FLUSH) 0.9 %
10 SYRINGE (ML) INJECTION PRN
Status: DISCONTINUED | OUTPATIENT
Start: 2020-12-23 | End: 2020-12-26 | Stop reason: HOSPADM

## 2020-12-23 RX ORDER — ACETAMINOPHEN 325 MG/1
650 TABLET ORAL EVERY 6 HOURS PRN
Status: DISCONTINUED | OUTPATIENT
Start: 2020-12-23 | End: 2020-12-26 | Stop reason: HOSPADM

## 2020-12-23 RX ORDER — ESTRADIOL 1 MG/1
1 TABLET ORAL DAILY
Status: DISCONTINUED | OUTPATIENT
Start: 2020-12-23 | End: 2020-12-26 | Stop reason: HOSPADM

## 2020-12-23 RX ORDER — 0.9 % SODIUM CHLORIDE 0.9 %
30 INTRAVENOUS SOLUTION INTRAVENOUS PRN
Status: DISCONTINUED | OUTPATIENT
Start: 2020-12-23 | End: 2020-12-26 | Stop reason: HOSPADM

## 2020-12-23 RX ORDER — ONDANSETRON 2 MG/ML
4 INJECTION INTRAMUSCULAR; INTRAVENOUS EVERY 6 HOURS PRN
Status: DISCONTINUED | OUTPATIENT
Start: 2020-12-23 | End: 2020-12-26 | Stop reason: HOSPADM

## 2020-12-23 RX ORDER — ASPIRIN 81 MG/1
81 TABLET ORAL DAILY
Status: DISCONTINUED | OUTPATIENT
Start: 2020-12-23 | End: 2020-12-26 | Stop reason: HOSPADM

## 2020-12-23 RX ADMIN — DEXTROSE MONOHYDRATE 500 MG: 50 INJECTION, SOLUTION INTRAVENOUS at 22:34

## 2020-12-23 RX ADMIN — DEXAMETHASONE 6 MG: 4 TABLET ORAL at 21:45

## 2020-12-23 RX ADMIN — Medication 10 ML: at 21:48

## 2020-12-23 RX ADMIN — CEFTRIAXONE SODIUM 1 G: 1 INJECTION, POWDER, FOR SOLUTION INTRAMUSCULAR; INTRAVENOUS at 21:48

## 2020-12-23 NOTE — PLAN OF CARE
Admit 2W     COVID-19 PNA-->has been admitted at [facility] for 2 days, started on decadron and remdesivir, hypoxia with 2L NC O2, check PCT, Abx ordered based on prior ED visit with lobar PNA changes, no pulm consult on admission-->may need if symptoms worsening

## 2020-12-23 NOTE — LETTER
Yvonne Parham  1965   AGREEMENT FOR SELF-ISOLATION FOR COVID-19 (required for [address])      Since you have been tested for SARS-CoV-2 or Coronavirus Disease (COVID 19) you are required consistent with the recommendations of the Centers for Disease Control & Prevention (CDC)  to self-isolate until receiving confirmation of a negative test or for 14 days after the first onset of symptoms. The time period for self-isolation will be specified at the time of collecting the nasopharyngeal test sample. To prevent possible transmission of COVID 19 to others, I agree to follow the measures described below until notified by by my provider or [facility] or [employer]. I understand these measures based upon recommendations of the CDC are necessary to minimize the exposure of others to COVID 19.    ? I agree to remain in my residence. I agree not to leave my residence unless it is absolutely necessary (such as in an emergency). If I must leave my home for any reason or develop symptoms consistent with COVID 19, I agree to notify my local health department as soon as possible. ? I agree to not have visitors in my residence. ? I understand that the mask will reduce the exposure of others to COVID 19.    ? I agree to wear a mask and utilize additional precautions, as instructed by [facility] or my provider, if I must leave my home for a doctor appointment with my physician or any clinic during the 14-day isolation period (or negative test result). ? I agree to not utilize public transportation for the 14-day isolation period. I understand additional information is available on what to do if you are sick at [facility].de. html      Contact Information for [address] [place] [address] Street of Patient Residence: [address] [phone]           Signature of Responsible Party  Relationship to Patient  Date Witness

## 2020-12-23 NOTE — CARE COORDINATION
Date/Time:  12/23/2020 11:14 AM  Attempted to reach patient by telephone. Call within 2 business days of discharge: Yes Left HIPPA compliant message requesting a return call. Will attempt to reach patient again.

## 2020-12-24 PROBLEM — J96.00 ACUTE RESPIRATORY FAILURE DUE TO COVID-19 (HCC): Status: ACTIVE | Noted: 2020-12-23

## 2020-12-24 LAB
A/G RATIO: 1.4 (ref 1.1–2.2)
ALBUMIN SERPL-MCNC: 3.3 G/DL (ref 3.4–5)
ALP BLD-CCNC: 67 U/L (ref 40–129)
ALT SERPL-CCNC: 105 U/L (ref 10–40)
ANION GAP SERPL CALCULATED.3IONS-SCNC: 7 MMOL/L (ref 3–16)
AST SERPL-CCNC: 72 U/L (ref 15–37)
BASOPHILS ABSOLUTE: 0 K/UL (ref 0–0.2)
BASOPHILS RELATIVE PERCENT: 0.2 %
BILIRUB SERPL-MCNC: <0.2 MG/DL (ref 0–1)
BUN BLDV-MCNC: 19 MG/DL (ref 7–20)
CALCIUM SERPL-MCNC: 8.8 MG/DL (ref 8.3–10.6)
CHLORIDE BLD-SCNC: 109 MMOL/L (ref 99–110)
CO2: 25 MMOL/L (ref 21–32)
CREAT SERPL-MCNC: 0.6 MG/DL (ref 0.6–1.1)
EOSINOPHILS ABSOLUTE: 0 K/UL (ref 0–0.6)
EOSINOPHILS RELATIVE PERCENT: 0 %
GFR AFRICAN AMERICAN: >60
GFR NON-AFRICAN AMERICAN: >60
GLOBULIN: 2.4 G/DL
GLUCOSE BLD-MCNC: 132 MG/DL (ref 70–99)
HCT VFR BLD CALC: 35.2 % (ref 36–48)
HEMOGLOBIN: 11.5 G/DL (ref 12–16)
LYMPHOCYTES ABSOLUTE: 0.7 K/UL (ref 1–5.1)
LYMPHOCYTES RELATIVE PERCENT: 6.4 %
MCH RBC QN AUTO: 30.3 PG (ref 26–34)
MCHC RBC AUTO-ENTMCNC: 32.8 G/DL (ref 31–36)
MCV RBC AUTO: 92.4 FL (ref 80–100)
MONOCYTES ABSOLUTE: 0.6 K/UL (ref 0–1.3)
MONOCYTES RELATIVE PERCENT: 5 %
NEUTROPHILS ABSOLUTE: 10.1 K/UL (ref 1.7–7.7)
NEUTROPHILS RELATIVE PERCENT: 88.4 %
PDW BLD-RTO: 13.7 % (ref 12.4–15.4)
PLATELET # BLD: 215 K/UL (ref 135–450)
PMV BLD AUTO: 8.9 FL (ref 5–10.5)
POTASSIUM REFLEX MAGNESIUM: 3.9 MMOL/L (ref 3.5–5.1)
PROCALCITONIN: 0.05 NG/ML (ref 0–0.15)
RBC # BLD: 3.81 M/UL (ref 4–5.2)
SODIUM BLD-SCNC: 141 MMOL/L (ref 136–145)
TOTAL PROTEIN: 5.7 G/DL (ref 6.4–8.2)
WBC # BLD: 11.4 K/UL (ref 4–11)

## 2020-12-24 PROCEDURE — 85025 COMPLETE CBC W/AUTO DIFF WBC: CPT

## 2020-12-24 PROCEDURE — 1200000000 HC SEMI PRIVATE

## 2020-12-24 PROCEDURE — 94761 N-INVAS EAR/PLS OXIMETRY MLT: CPT

## 2020-12-24 PROCEDURE — 2060000000 HC ICU INTERMEDIATE R&B

## 2020-12-24 PROCEDURE — 6370000000 HC RX 637 (ALT 250 FOR IP): Performed by: INTERNAL MEDICINE

## 2020-12-24 PROCEDURE — 94640 AIRWAY INHALATION TREATMENT: CPT

## 2020-12-24 PROCEDURE — 36415 COLL VENOUS BLD VENIPUNCTURE: CPT

## 2020-12-24 PROCEDURE — 2580000003 HC RX 258: Performed by: PHYSICIAN ASSISTANT

## 2020-12-24 PROCEDURE — 2500000003 HC RX 250 WO HCPCS: Performed by: PHYSICIAN ASSISTANT

## 2020-12-24 PROCEDURE — 84145 PROCALCITONIN (PCT): CPT

## 2020-12-24 PROCEDURE — 6370000000 HC RX 637 (ALT 250 FOR IP): Performed by: PHYSICIAN ASSISTANT

## 2020-12-24 PROCEDURE — 80053 COMPREHEN METABOLIC PANEL: CPT

## 2020-12-24 PROCEDURE — 99223 1ST HOSP IP/OBS HIGH 75: CPT | Performed by: INTERNAL MEDICINE

## 2020-12-24 PROCEDURE — 6360000002 HC RX W HCPCS: Performed by: PHYSICIAN ASSISTANT

## 2020-12-24 PROCEDURE — 2700000000 HC OXYGEN THERAPY PER DAY

## 2020-12-24 RX ORDER — POLYETHYLENE GLYCOL 3350 17 G/17G
17 POWDER, FOR SOLUTION ORAL DAILY
Status: DISCONTINUED | OUTPATIENT
Start: 2020-12-24 | End: 2020-12-26 | Stop reason: HOSPADM

## 2020-12-24 RX ORDER — ALBUTEROL SULFATE 90 UG/1
2 AEROSOL, METERED RESPIRATORY (INHALATION) EVERY 6 HOURS PRN
Status: DISCONTINUED | OUTPATIENT
Start: 2020-12-24 | End: 2020-12-26 | Stop reason: HOSPADM

## 2020-12-24 RX ORDER — ALBUTEROL SULFATE 90 UG/1
2 AEROSOL, METERED RESPIRATORY (INHALATION)
Status: DISCONTINUED | OUTPATIENT
Start: 2020-12-24 | End: 2020-12-25

## 2020-12-24 RX ORDER — PROMETHAZINE HYDROCHLORIDE AND CODEINE PHOSPHATE 6.25; 1 MG/5ML; MG/5ML
5 SOLUTION ORAL EVERY 4 HOURS PRN
Status: DISCONTINUED | OUTPATIENT
Start: 2020-12-24 | End: 2020-12-26 | Stop reason: HOSPADM

## 2020-12-24 RX ORDER — SENNA PLUS 8.6 MG/1
3 TABLET ORAL DAILY PRN
Status: DISCONTINUED | OUTPATIENT
Start: 2020-12-24 | End: 2020-12-26 | Stop reason: HOSPADM

## 2020-12-24 RX ADMIN — CEFTRIAXONE SODIUM 1 G: 1 INJECTION, POWDER, FOR SOLUTION INTRAMUSCULAR; INTRAVENOUS at 20:08

## 2020-12-24 RX ADMIN — ENOXAPARIN SODIUM 30 MG: 100 INJECTION SUBCUTANEOUS at 10:13

## 2020-12-24 RX ADMIN — POLYETHYLENE GLYCOL (3350) 17 G: 17 POWDER, FOR SOLUTION ORAL at 10:15

## 2020-12-24 RX ADMIN — Medication 2 PUFF: at 23:25

## 2020-12-24 RX ADMIN — GUAIFENESIN AND DEXTROMETHORPHAN 5 ML: 100; 10 SYRUP ORAL at 12:42

## 2020-12-24 RX ADMIN — DEXTROSE MONOHYDRATE 500 MG: 50 INJECTION, SOLUTION INTRAVENOUS at 20:44

## 2020-12-24 RX ADMIN — SENNOSIDES 25.8 MG: 8.6 TABLET, FILM COATED ORAL at 12:42

## 2020-12-24 RX ADMIN — ASPIRIN 81 MG: 81 TABLET, COATED ORAL at 10:14

## 2020-12-24 RX ADMIN — Medication 10 ML: at 10:14

## 2020-12-24 RX ADMIN — ESTRADIOL 1 MG: 1 TABLET ORAL at 10:14

## 2020-12-24 RX ADMIN — Medication 10 ML: at 20:08

## 2020-12-24 RX ADMIN — REMDESIVIR 100 MG: 100 INJECTION, POWDER, LYOPHILIZED, FOR SOLUTION INTRAVENOUS at 10:11

## 2020-12-24 RX ADMIN — SODIUM CHLORIDE 30 ML: 9 INJECTION, SOLUTION INTRAVENOUS at 10:11

## 2020-12-24 RX ADMIN — ENOXAPARIN SODIUM 30 MG: 100 INJECTION SUBCUTANEOUS at 20:08

## 2020-12-24 RX ADMIN — DEXAMETHASONE 6 MG: 4 TABLET ORAL at 10:14

## 2020-12-24 NOTE — FLOWSHEET NOTE
12/24/20 0841   Vital Signs   Temp 98.7 °F (37.1 °C)   Temp Source Oral   Pulse 62   Heart Rate Source Monitor   Resp 18   /61   BP Location Right upper arm   Patient Position Semi fowlers   Level of Consciousness Alert (0)   MEWS Score 1   Patient Currently in Pain No   Oxygen Therapy   SpO2 94 %   O2 Device Nasal cannula   O2 Flow Rate (L/min) 1.5 L/min   AM assessment completed, see flow sheet. Pt is alert and oriented. Vital signs are WNL. Respirations are even & easy. Pt does c/o dyspnea with exertion. No complaints voiced. Pt denies needs at this time. SR up x 2, and bed in low position. Call light is within reach.

## 2020-12-24 NOTE — PROGRESS NOTES
Received report from Shantel Escudero at [facility], pt arrived during shift change, therefore report was given to [address] [place] [address] Rd, no care was given to this pt from this RN.

## 2020-12-24 NOTE — H&P
Hospital Medicine History & Physical      PCP: Nafisa Cisneros DO    Date of Admission: 2020    Date of Service: Pt seen/examined on 2020    Chief Complaint:  SOB, COVID-19     History Of Present Illness: The patient is a 50 y.o. female with endometriosis and recently dx with COVID-19 who was transferred from [facility] after 2 days of admission for COVID PNA with acute hypoxic respiratory failure. She was placed on methylprednisolone and remdesivir. She was transferred here for further management. She is currently requiring 1.5 L of oxygen. Past Medical History:        Diagnosis Date    Difficult intubation     states she's been told twice this.  Endometriosis     Fibroid tumor     Nausea & vomiting     PONV (postoperative nausea and vomiting)     TMJ disease        Past Surgical History:        Procedure Laterality Date     SECTION      CHOLECYSTECTOMY  2013    laparoscopic    CYSTOSCOPY      DILATION AND CURETTAGE OF UTERUS      OTHER SURGICAL HISTORY  2018    SUCTION DILATATION AND CURETTAGE HYSTEROSCOPY,    STUART AND BSO  2018     Robotic assisted laparoscopic hysterectomy, Bilateral salpingoophorectomy, Minilaparotomy for uterine extraction         Medications Prior to Admission:    Prior to Admission medications    Medication Sig Start Date End Date Taking?  Authorizing Provider   aspirin EC 81 MG EC tablet Take 1 tablet by mouth daily 20   German Kwan MD   azithromycin (ZITHROMAX) 250 MG tablet Take 1 tablet by mouth See Admin Instructions for 5 days 500mg on day 1 followed by 250mg on days 2 - 5 20  German Kwan MD   promethazine-dextromethorphan (PROMETHAZINE-DM) 6.25-15 MG/5ML syrup Take 5 mLs by mouth 4 times daily as needed for Cough (nausea) 20  Vanessa Nobles DO ondansetron (ZOFRAN ODT) 4 MG disintegrating tablet Take 1 tablet by mouth every 8 hours as needed for Nausea or Vomiting 12/17/20   Luna uGidry DO   benzonatate (TESSALON PERLES) 100 MG capsule Take 2 capsules by mouth 3 times daily as needed for Cough 12/17/20 12/24/20  Luna Guidry, DO   pseudoephedrine (SUDAFED) 60 MG tablet Take 1 tablet by mouth every 6 hours as needed for Congestion 12/17/20   Luna Guidry DO   acetaminophen (TYLENOL) 500 MG tablet Take 500 mg by mouth every 6 hours as needed for Pain or Fever    Historical Provider, MD   estradiol (ESTRACE) 1 MG tablet Take 1 mg by mouth daily    Historical Provider, MD   fexofenadine (ALLEGRA ALLERGY) 180 MG tablet Take 180 mg by mouth daily    Historical Provider, MD   ibuprofen (ADVIL;MOTRIN) 600 MG tablet Take 1 tablet by mouth every 6 hours as needed for Pain 3/5/18   Inocencio Marlow DO       Allergies: Iodides    Social History:  The patient currently lives at home     TOBACCO:   reports that she has never smoked. She has never used smokeless tobacco.  ETOH:   reports no history of alcohol use. Family History:   Positive as follows:        Problem Relation Age of Onset    High Cholesterol Mother     Diabetes Father     Kidney Disease Father     High Cholesterol Father        REVIEW OF SYSTEMS:       Constitutional: +for fever   HENT: Negative for sore throat   Eyes: Negative for redness   Respiratory: +  for dyspnea, cough   Cardiovascular: Negative for chest pain   Gastrointestinal: Negative for vomiting, diarrhea   Genitourinary: Negative for hematuria   Musculoskeletal: Negative for arthralgias   Skin: Negative for rash   Neurological: Negative for syncope   Hematological: Negative for adenopathy   Psychiatric/Behavorial: Negative for anxiety    PHYSICAL EXAM:    /61   Pulse 62   Temp 98.7 °F (37.1 °C) (Oral)   Resp 18   LMP 08/14/2018 (Exact Date)   SpO2 94%     Gen: No distress. Alert. Eyes: PERRL. No sclera icterus. No conjunctival injection. ENT: No discharge. Pharynx clear. Neck: No JVD. No Carotid Bruit. Trachea midline. Resp: No accessory muscle use. Bilateral crackles. No wheezes. No rhonchi. CV: Regular rate. Regular rhythm. No murmur. No rub. No edema. Capillary Refill: Brisk,< 3 seconds   Peripheral Pulses: +2 palpable, equal bilaterally   GI: Non-tender. Non-distended. No masses. No organomegaly. Normal bowel sounds. No hernia. Skin: Warm and dry. No nodule on exposed extremities. No rash on exposed extremities. M/S: No cyanosis. No joint deformity. No clubbing. Neuro: Awake. Grossly nonfocal    Psych: Oriented x 3. No anxiety or agitation. CBC:   Recent Labs     12/24/20  0542   WBC 11.4*   HGB 11.5*   HCT 35.2*   MCV 92.4        BMP:   Recent Labs     12/24/20  0542      K 3.9      CO2 25   BUN 19   CREATININE 0.6     LIVER PROFILE:   Recent Labs     12/24/20  0542   AST 72*   *   BILITOT <0.2   ALKPHOS 67     CULTURES  COVID-19 (12/21): +   Strep Pneumonia: pending   Legionella Antigen: pending     EKG:    No EKG from this admission for review    RADIOLOGY  No orders to display     Pertinent previous results reviewed   Urine Cx: 12/17/20  Escherichia coli (1)    Antibiotic Interpretation CHANCE Status    ampicillin Sensitive 4 mcg/mL     ceFAZolin Sensitive <=4 mcg/mL      NOTE: Cefazolin should only be used for uncomplicated UTI         for E.coli or Klebsiella pneumoniae.    cefepime Sensitive <=0.12 mcg/mL     cefTRIAXone Sensitive <=0.25 mcg/mL     ciprofloxacin Sensitive <=0.25 mcg/mL     ertapenem Sensitive <=0.12 mcg/mL     gentamicin Sensitive <=1 mcg/mL     levofloxacin Sensitive <=0.12 mcg/mL     nitrofurantoin Sensitive <=16 mcg/mL     piperacillin-tazobactam Sensitive <=4 mcg/mL     trimethoprim-sulfamethoxazole Sensitive <=20 mcg/mL           ASSESSMENT/PLAN:  COVID-19 PNA - dx on 12/14 , has been admitted at [facility] x 2 days prior to transfer   - [address] [facility]   - associated with hypoxia, CTPA negative for PE at OSH and Echo with normal EF at OSH  - Was started on methylprednisolone (12/22), start decadron D#1 and remdesivir D#3 at [facility] but no CCP given. Too late in the process for CC  - wean supplemental O2 as tolerated   - PCT wnl, but patient has been on Abx for initial concerns for bacterial PNA in ED on 12/19, continued on Azithromycin (suspect day #5 based on ED visit), Rocephin D#2     Acute hypoxic Respiratory Failure  - Does not use supplemental O2 at home   - Now requiring 1.5L NC O2   - 2/2 COVID PNA   - Wean as tolerated  - See above      Abnormal LFTs  - in the setting of acute COVID infection   - Receiving remdesivir   - Monitor LFTs closely     UTI?  - Urine Cx from 12/17 + for E. Coli   - Was on Azithromycin but no other Abx  - Pan sensitive on culture    - Now on Rocephin D#2    Constipation. MiraLAX and senna. DVT Prophylaxis: Lovenox 30 mg BID  Diet: DIET GENERAL;   Code Status: Full Code    OJE Coleman.

## 2020-12-24 NOTE — CARE COORDINATION
Case Management Assessment  Initial Evaluation      Patient Name: Amanda Kwan  YOB: 1965  Diagnosis: Pneumonia due to COVID-19 virus [U07.1, J12.89]  Date / Time: 12/23/2020  7:19 PM    Admission status/Date: 12/23/2020 inpatient   Chart Reviewed: Yes      Patient Veda Pickett:  Yes via call to bedside phone due to +covid-19  Family Interviewed:  No      Hospitalization in the last 30 days:  No      Health Care Decision Maker :     (CM - must 1st enter selection under Navigator - emergency contact- Odell Relationship and pick relationship)   Who do you trust or have selected to make healthcare decisions for you      Met with: pt  Interview conducted  (bedside/phone): phone    Current PCP: Maritza Fay  Pt stated she has insurance through [facility] as she is an employee here  Radha TeraFirrma required for SNF : Y          3 night stay required - [employer]  Support Systems/Care Needs: Parent, Children, Family Members  Transportation: self    Meal Preparation: self    Housing  Living Arrangements: pt lives at home with her 15year old twins  Steps: \"couple\"  Intent for return to present living arrangements: Yes  Identified Issues: [place] Sandra  Active with [address] : No Agency:(Services)  Type of Home Care Services: None  Passport/Waiver : No  :                      Phone Number:    Passport/Waiver Services: n/a          Durable Medical Equiptment   DME Provider:   Equipment:   Walker___Cane___RTS___ BSC___Shower Chair___Hospital Bed___W/C____Other________  02 at ____Liter(s)---wears(frequency)_______ HHN ___ CPAP___ BiPap___   N/A___x_      Home O2 Use :  No    If No for home O2---if presently on O2 during hospitalization:  Yes  if yes CM to follow for potential DC O2 need  Informed of need for care provider to bring portable home O2 tank on day of discharge for nursing to connect prior to leaving:   Not Indicated Verbalized agreement/Understanding:   Not Indicated    Community Service Affiliation  Dialysis:  No    · Agency:  · Location:  · Dialysis Schedule:  · Phone:   · Fax: Other Community Services: n/a    DISCHARGE PLAN: Explained Case Management role/services. Chart review. Spoke with pt via call to bedside phone. Pt reported being independent at home. Pt is an RN at [employer]. Pt plans on returning home when discharged. Pt denied needs for HHC. Pt stated she may need to go home on o2. Explained a list would be provided of DME companies to review and she denied needing or wanting a list. Pt requested CM to find an O2 company if she needs this. CM will follow for possible o2. Please notify CM if needs or concerns arise.

## 2020-12-24 NOTE — PROGRESS NOTES
Patient admitted to room 219 from [facility]. Patient oriented to room, call light, bed rails, phone, lights and bathroom. Patient instructed about the schedule of the day including: vital sign frequency, lab draws, possible tests, frequency of MD and staff rounds, daily weights, I &O's and prescribed diet. Bed alarm deferred patient low fall risk and refuses alarm. Telemetry box in place, patient aware of placement and reason. Bed locked, in lowest position, side rails up 2/4, call light within reach. Recliner Assessment:     Patient is able to demonstrate the ability to move from a reclining position to an upright position within the recliner. 4 Eyes Skin Assessment:     The patient is being assess for   Admission    I agree that 2 RN's have performed a thorough Head to Toe Skin Assessment on the patient. ALL assessment sites listed below have been assessed. Areas assessed by both nurses:   []   Head, Face, and Ears   []   Shoulders, Back, and Chest, Abdomen  []   Arms, Elbows, and Hands   []   Coccyx, Sacrum, and Ischium  []   Legs, Feet, and Heels        Patient refused 4 eyes skin assessment, denies any open areas or rashes. **SHARE this note so that the co-signing nurse is able to place an eSignature**    Co-signer eSignature: Electronically signed by Ede Waller RN on 12/24/20 at 3:02 AM EST    Does the Patient have Skin Breakdown?   No          Satya Prevention initiated:  NA   Wound Care Orders initiated:  NA      [facility] nurse consulted for Pressure Injury (Stage 3,4, Unstageable, DTI, NWPT, Complex wounds)and New or Established Ostomies:  NA      Primary Nurse eSignature: Electronically signed by Ede Waller RN on 12/24/20 at 3:02 AM EST

## 2020-12-25 LAB
A/G RATIO: 1.5 (ref 1.1–2.2)
ALBUMIN SERPL-MCNC: 3.2 G/DL (ref 3.4–5)
ALP BLD-CCNC: 62 U/L (ref 40–129)
ALT SERPL-CCNC: 101 U/L (ref 10–40)
ANION GAP SERPL CALCULATED.3IONS-SCNC: 5 MMOL/L (ref 3–16)
AST SERPL-CCNC: 47 U/L (ref 15–37)
BILIRUB SERPL-MCNC: <0.2 MG/DL (ref 0–1)
BUN BLDV-MCNC: 21 MG/DL (ref 7–20)
CALCIUM SERPL-MCNC: 8.7 MG/DL (ref 8.3–10.6)
CHLORIDE BLD-SCNC: 105 MMOL/L (ref 99–110)
CO2: 26 MMOL/L (ref 21–32)
CREAT SERPL-MCNC: 0.6 MG/DL (ref 0.6–1.1)
GFR AFRICAN AMERICAN: >60
GFR NON-AFRICAN AMERICAN: >60
GLOBULIN: 2.2 G/DL
GLUCOSE BLD-MCNC: 103 MG/DL (ref 70–99)
HCT VFR BLD CALC: 33.3 % (ref 36–48)
HEMOGLOBIN: 11.1 G/DL (ref 12–16)
MCH RBC QN AUTO: 30.8 PG (ref 26–34)
MCHC RBC AUTO-ENTMCNC: 33.4 G/DL (ref 31–36)
MCV RBC AUTO: 92.3 FL (ref 80–100)
PDW BLD-RTO: 13.5 % (ref 12.4–15.4)
PLATELET # BLD: 221 K/UL (ref 135–450)
PMV BLD AUTO: 9 FL (ref 5–10.5)
POTASSIUM SERPL-SCNC: 3.8 MMOL/L (ref 3.5–5.1)
RBC # BLD: 3.61 M/UL (ref 4–5.2)
SODIUM BLD-SCNC: 136 MMOL/L (ref 136–145)
TOTAL PROTEIN: 5.4 G/DL (ref 6.4–8.2)
WBC # BLD: 8.2 K/UL (ref 4–11)

## 2020-12-25 PROCEDURE — 36415 COLL VENOUS BLD VENIPUNCTURE: CPT

## 2020-12-25 PROCEDURE — 6370000000 HC RX 637 (ALT 250 FOR IP): Performed by: PHYSICIAN ASSISTANT

## 2020-12-25 PROCEDURE — 2580000003 HC RX 258: Performed by: PHYSICIAN ASSISTANT

## 2020-12-25 PROCEDURE — 6370000000 HC RX 637 (ALT 250 FOR IP): Performed by: INTERNAL MEDICINE

## 2020-12-25 PROCEDURE — 6360000002 HC RX W HCPCS: Performed by: PHYSICIAN ASSISTANT

## 2020-12-25 PROCEDURE — 94761 N-INVAS EAR/PLS OXIMETRY MLT: CPT

## 2020-12-25 PROCEDURE — 2500000003 HC RX 250 WO HCPCS: Performed by: INTERNAL MEDICINE

## 2020-12-25 PROCEDURE — 1200000000 HC SEMI PRIVATE

## 2020-12-25 PROCEDURE — 80053 COMPREHEN METABOLIC PANEL: CPT

## 2020-12-25 PROCEDURE — 85027 COMPLETE CBC AUTOMATED: CPT

## 2020-12-25 PROCEDURE — 94640 AIRWAY INHALATION TREATMENT: CPT

## 2020-12-25 PROCEDURE — 2580000003 HC RX 258: Performed by: INTERNAL MEDICINE

## 2020-12-25 PROCEDURE — 99232 SBSQ HOSP IP/OBS MODERATE 35: CPT | Performed by: INTERNAL MEDICINE

## 2020-12-25 RX ORDER — ALBUTEROL SULFATE 90 UG/1
2 AEROSOL, METERED RESPIRATORY (INHALATION) EVERY 6 HOURS PRN
Qty: 1 INHALER | Refills: 0 | Status: SHIPPED | OUTPATIENT
Start: 2020-12-25 | End: 2022-01-07

## 2020-12-25 RX ORDER — ALBUTEROL SULFATE 90 UG/1
2 AEROSOL, METERED RESPIRATORY (INHALATION) 3 TIMES DAILY
Status: DISCONTINUED | OUTPATIENT
Start: 2020-12-25 | End: 2020-12-26 | Stop reason: HOSPADM

## 2020-12-25 RX ORDER — CEPHALEXIN 250 MG/1
250 CAPSULE ORAL 4 TIMES DAILY
Qty: 12 CAPSULE | Refills: 0 | Status: ON HOLD | OUTPATIENT
Start: 2020-12-25 | End: 2021-07-30 | Stop reason: ALTCHOICE

## 2020-12-25 RX ORDER — CEPHALEXIN 250 MG/1
250 CAPSULE ORAL EVERY 6 HOURS SCHEDULED
Status: DISCONTINUED | OUTPATIENT
Start: 2020-12-25 | End: 2020-12-26 | Stop reason: HOSPADM

## 2020-12-25 RX ORDER — DEXAMETHASONE 6 MG/1
6 TABLET ORAL DAILY
Qty: 8 TABLET | Refills: 0 | Status: SHIPPED | OUTPATIENT
Start: 2020-12-25 | End: 2021-01-02

## 2020-12-25 RX ORDER — SENNA PLUS 8.6 MG/1
3 TABLET ORAL DAILY PRN
Qty: 15 TABLET | Refills: 0 | Status: ON HOLD | OUTPATIENT
Start: 2020-12-25 | End: 2021-07-30 | Stop reason: ALTCHOICE

## 2020-12-25 RX ADMIN — ENOXAPARIN SODIUM 30 MG: 100 INJECTION SUBCUTANEOUS at 20:54

## 2020-12-25 RX ADMIN — ESTRADIOL 1 MG: 1 TABLET ORAL at 09:13

## 2020-12-25 RX ADMIN — Medication 2 PUFF: at 07:56

## 2020-12-25 RX ADMIN — Medication 2 PUFF: at 13:29

## 2020-12-25 RX ADMIN — Medication 10 ML: at 09:25

## 2020-12-25 RX ADMIN — SENNOSIDES 25.8 MG: 8.6 TABLET, FILM COATED ORAL at 09:40

## 2020-12-25 RX ADMIN — CEPHALEXIN 250 MG: 250 CAPSULE ORAL at 09:39

## 2020-12-25 RX ADMIN — ASPIRIN 81 MG: 81 TABLET, COATED ORAL at 09:13

## 2020-12-25 RX ADMIN — CEPHALEXIN 250 MG: 250 CAPSULE ORAL at 12:37

## 2020-12-25 RX ADMIN — REMDESIVIR 100 MG: 100 INJECTION, POWDER, LYOPHILIZED, FOR SOLUTION INTRAVENOUS at 09:13

## 2020-12-25 RX ADMIN — Medication 2 PUFF: at 20:04

## 2020-12-25 RX ADMIN — GUAIFENESIN AND DEXTROMETHORPHAN 5 ML: 100; 10 SYRUP ORAL at 18:19

## 2020-12-25 RX ADMIN — POLYETHYLENE GLYCOL (3350) 17 G: 17 POWDER, FOR SOLUTION ORAL at 09:15

## 2020-12-25 RX ADMIN — DEXAMETHASONE 6 MG: 4 TABLET ORAL at 09:13

## 2020-12-25 RX ADMIN — CEPHALEXIN 250 MG: 250 CAPSULE ORAL at 18:14

## 2020-12-25 RX ADMIN — ENOXAPARIN SODIUM 30 MG: 100 INJECTION SUBCUTANEOUS at 09:12

## 2020-12-25 RX ADMIN — Medication 10 ML: at 20:54

## 2020-12-25 ASSESSMENT — PAIN SCALES - GENERAL
PAINLEVEL_OUTOF10: 0

## 2020-12-25 NOTE — PLAN OF CARE
Problem: Airway Clearance - Ineffective  Goal: Achieve or maintain patent airway  12/25/2020 0246 by Joe Mar RN  Outcome: Ongoing     Problem: Gas Exchange - Impaired  Goal: Absence of hypoxia  12/25/2020 0246 by Joe Mar RN  Outcome: Ongoing  Goal: Promote optimal lung function  12/25/2020 0246 by Joe Mar RN  Outcome: Ongoing     Problem: Breathing Pattern - Ineffective  Goal: Ability to achieve and maintain a regular respiratory rate  12/25/2020 0246 by Joe Mar RN  Outcome: Ongoing     Problem:  Body Temperature -  Risk of, Imbalanced  Goal: Ability to maintain a body temperature within defined limits  12/25/2020 0246 by Joe Mar RN  Outcome: Ongoing  Goal: Will regain or maintain usual level of consciousness  12/25/2020 0246 by Joe Mar RN  Outcome: Ongoing  Goal: Complications related to the disease process, condition or treatment will be avoided or minimized  12/25/2020 0246 by Joe Mar RN  Outcome: Ongoing     Problem: Isolation Precautions - Risk of Spread of Infection  Goal: Prevent transmission of infection  12/25/2020 0246 by Joe Mar RN  Outcome: Ongoing     Problem: Nutrition Deficits  Goal: Optimize nutrtional status  12/25/2020 0246 by Joe Mar RN  Outcome: Ongoing     Problem: Risk for Fluid Volume Deficit  Goal: Maintain normal heart rhythm  12/25/2020 0246 by Joe Mar RN  Outcome: Ongoing  Goal: Maintain absence of muscle cramping  12/25/2020 0246 by Joe Mar RN  Outcome: Ongoing  Goal: Maintain normal serum potassium, sodium, calcium, phosphorus, and pH  12/25/2020 0246 by Joe Mar RN  Outcome: Ongoing     Problem: Loneliness or Risk for Loneliness  Goal: Demonstrate positive use of time alone when socialization is not possible  12/25/2020 0246 by Joe Mar RN  Outcome: Ongoing     Problem: Fatigue  Goal: Verbalize increase energy and improved vitality 12/25/2020 0246 by Humberto Baldwin RN  Outcome: Ongoing     Problem: Patient Education: Go to Patient Education Activity  Goal: Patient/Family Education  12/25/2020 0246 by Humberto Baldwin RN  Outcome: Ongoing

## 2020-12-25 NOTE — PROGRESS NOTES
AM assessment done at this time. Medications given per [facility]; refer to [facility]. Fresh ice water given. No pain noted. 02 off at this time; SAT 96% RA. Alert and oriented. Call light within reach, bed in lowest position.

## 2020-12-25 NOTE — PROGRESS NOTES
Skin: Warm and dry. No nodule on exposed extremities. No rash on exposed extremities. M/S: No cyanosis. No joint deformity. No clubbing. Neuro: Awake. Grossly nonfocal    Psych: Oriented x 3. No anxiety or agitation. Lab Data:  CBC:   Recent Labs     12/24/20  0542 12/25/20  0620   WBC 11.4* 8.2   RBC 3.81* 3.61*   HGB 11.5* 11.1*   HCT 35.2* 33.3*   MCV 92.4 92.3   RDW 13.7 13.5    221     BMP:   Recent Labs     12/24/20  0542 12/25/20  0620    136   K 3.9 3.8    105   CO2 25 26   BUN 19 21*   CREATININE 0.6 0.6     BNP: No results for input(s): BNP in the last 72 hours. PT/INR: No results for input(s): PROTIME, INR in the last 72 hours. APTT:No results for input(s): APTT in the last 72 hours. CARDIAC ENZYMES: No results for input(s): CKMB, CKMBINDEX, TROPONINI in the last 72 hours. Invalid input(s): CKTOTAL;3  FASTING LIPID PANEL:  Lab Results   Component Value Date    CHOL 173 08/25/2020    HDL 38 08/25/2020    HDL 33 06/15/2011    TRIG 113 08/25/2020     LIVER PROFILE:   Recent Labs     12/24/20  0542 12/25/20  0620   AST 72* 47*   * 101*   BILITOT <0.2 <0.2   ALKPHOS 67 62       Urine Cx: 12/17/20  Escherichia coli (1)              Antibiotic Interpretation CHANCE Status     ampicillin Sensitive 4 mcg/mL       ceFAZolin Sensitive <=4 mcg/mL         NOTE: Cefazolin should only be used for uncomplicated UTI         for E.coli or Klebsiella pneumoniae.    cefepime Sensitive <=0.12 mcg/mL       cefTRIAXone Sensitive <=0.25 mcg/mL       ciprofloxacin Sensitive <=0.25 mcg/mL       ertapenem Sensitive <=0.12 mcg/mL       gentamicin Sensitive <=1 mcg/mL       levofloxacin Sensitive <=0.12 mcg/mL       nitrofurantoin Sensitive <=16 mcg/mL       piperacillin-tazobactam Sensitive <=4 mcg/mL       trimethoprim-sulfamethoxazole Sensitive <=20 mcg/mL              Assessment & Plan:     Patient Active Problem List    Diagnosis Date Noted    Abnormal LFTs  Urinary tract infection without hematuria     Acute respiratory failure due to COVID-19 ([address] 12/23/2020    Post-op pain 03/06/2018    Fibroid uterus 03/05/2018    DUB (dysfunctional uterine bleeding) 02/23/2018       COVID-19 PNA  - dx on 12/14 , has been admitted at [facility] x 2 days prior to transfer   - DROPLET + PRECAUTIONS   - associated with hypoxia, CTPA negative for PE at OSH and Echo with normal EF at OSH  - Was started on methylprednisolone (12/22), start decadron D#2 and remdesivir D#4. Recd 2 doses  at [facility] but no CCP given. Too late in the process for CC  - wean supplemental O2 as tolerated   - PCT wnl, but patient has been on Abx for initial concerns for bacterial PNA in ED on 12/19, finished 5 days of  Azithromycin      Acute hypoxic Respiratory Failure  - Does not use supplemental O2 at home   - Now requiring 2 L NC O2   - 2/2 COVID PNA   - Wean as tolerated  - See above       Abnormal LFTs  - in the setting of acute COVID infection   - Receiving remdesivir   - Monitor LFTs closely      UTI?  - Urine Cx from 12/17 + for E. Coli   - Was on Azithromycin but no other Abx  - Pan sensitive on culture    - on Rocephin D#2- switch to keflex day 1/3      Constipation.   MiraLAX and senna.     DVT Prophylaxis: Lovenox 30 mg BID  Diet: DIET GENERAL;   Code Status: Full Code        Fortunatolorie Nikkie

## 2020-12-25 NOTE — PROGRESS NOTES
Level of Consciousness: Alert, Oriented, and Cooperative = 0    Level of Activity: Walking unassisted = 0    Respiratory Pattern: Regular Pattern; RR 8-20 = 0    Breath Sounds: Diminshed bilaterally and/or crackles = 2    Sputum   ,  ,    Cough: Strong, spontaneous, non-productive = 0    Vital Signs   /64   Pulse 60   Temp 97.4 °F (36.3 °C) (Oral)   Resp 16   LMP 08/14/2018 (Exact Date)   SpO2 97%   SPO2 (COPD values may differ): 90-91% on room air or greater than 92% on FiO2 24- 28% = 1    Peak Flow (asthma only): not applicable = 0    RSI: 0-4 = See once and convert to home regimen or discontinue        Plan       Goals: medication delivery    Patient/caregiver was educated on the proper method of use for Respiratory Care Devices:  Yes      Level of patient/caregiver understanding able to:   ? Verbalize understanding   ? Demonstrate understanding       ? Teach back        ? Needs reinforcement       ? No available caregiver               ? Other:     Response to education:  Good     Is patient being placed on Home Treatment Regimen? No     Does the patient have everything they need prior to discharge? NA     Comments: chart reviewed and patient assessed    Plan of Care: change albuterol/atrovent q4wa to tid per patient request    Electronically signed by Jacky Aggarwal RCP on 12/25/2020 at 1:20 AM    Respiratory Protocol Guidelines     1. Assessment and treatment by Respiratory Therapy will be initiated for medication and therapeutic interventions upon initiation of aerosolized medication. 2. Physician will be contacted for respiratory rate (RR) greater than 35 breaths per minute. Therapy will be held for heart rate (HR) greater than 140 beats per minute, pending direction from physician. 3. Bronchodilators will be administered via Metered Dose Inhaler (MDI) with spacer when the following criteria are met:  a.  Alert and cooperative     b. HR < 140 bpm  c. RR < 30 bpm 1. If the patient lacks prior history of lung disease, is not using inhaled anti-inflammatory medication at home, and lacks wheezing by examination or by history for at least 24 hours, contact physician for possible discontinuation.

## 2020-12-26 VITALS
SYSTOLIC BLOOD PRESSURE: 118 MMHG | HEART RATE: 66 BPM | OXYGEN SATURATION: 95 % | RESPIRATION RATE: 16 BRPM | DIASTOLIC BLOOD PRESSURE: 74 MMHG | TEMPERATURE: 97.6 F

## 2020-12-26 LAB
A/G RATIO: 1.4 (ref 1.1–2.2)
ALBUMIN SERPL-MCNC: 3.2 G/DL (ref 3.4–5)
ALP BLD-CCNC: 63 U/L (ref 40–129)
ALT SERPL-CCNC: 82 U/L (ref 10–40)
ANION GAP SERPL CALCULATED.3IONS-SCNC: 5 MMOL/L (ref 3–16)
AST SERPL-CCNC: 23 U/L (ref 15–37)
BILIRUB SERPL-MCNC: 0.3 MG/DL (ref 0–1)
BUN BLDV-MCNC: 20 MG/DL (ref 7–20)
CALCIUM SERPL-MCNC: 8.9 MG/DL (ref 8.3–10.6)
CHLORIDE BLD-SCNC: 105 MMOL/L (ref 99–110)
CO2: 27 MMOL/L (ref 21–32)
CREAT SERPL-MCNC: 0.7 MG/DL (ref 0.6–1.1)
GFR AFRICAN AMERICAN: >60
GFR NON-AFRICAN AMERICAN: >60
GLOBULIN: 2.3 G/DL
GLUCOSE BLD-MCNC: 99 MG/DL (ref 70–99)
HCT VFR BLD CALC: 35.9 % (ref 36–48)
HEMOGLOBIN: 12 G/DL (ref 12–16)
MCH RBC QN AUTO: 30.9 PG (ref 26–34)
MCHC RBC AUTO-ENTMCNC: 33.3 G/DL (ref 31–36)
MCV RBC AUTO: 92.7 FL (ref 80–100)
PDW BLD-RTO: 13.5 % (ref 12.4–15.4)
PLATELET # BLD: 235 K/UL (ref 135–450)
PMV BLD AUTO: 9 FL (ref 5–10.5)
POTASSIUM SERPL-SCNC: 4 MMOL/L (ref 3.5–5.1)
RBC # BLD: 3.88 M/UL (ref 4–5.2)
SODIUM BLD-SCNC: 137 MMOL/L (ref 136–145)
TOTAL PROTEIN: 5.5 G/DL (ref 6.4–8.2)
WBC # BLD: 8.8 K/UL (ref 4–11)

## 2020-12-26 PROCEDURE — 85027 COMPLETE CBC AUTOMATED: CPT

## 2020-12-26 PROCEDURE — 94761 N-INVAS EAR/PLS OXIMETRY MLT: CPT

## 2020-12-26 PROCEDURE — 36415 COLL VENOUS BLD VENIPUNCTURE: CPT

## 2020-12-26 PROCEDURE — 6360000002 HC RX W HCPCS: Performed by: PHYSICIAN ASSISTANT

## 2020-12-26 PROCEDURE — 6370000000 HC RX 637 (ALT 250 FOR IP): Performed by: PHYSICIAN ASSISTANT

## 2020-12-26 PROCEDURE — 2580000003 HC RX 258: Performed by: PHYSICIAN ASSISTANT

## 2020-12-26 PROCEDURE — 99238 HOSP IP/OBS DSCHRG MGMT 30/<: CPT | Performed by: NURSE PRACTITIONER

## 2020-12-26 PROCEDURE — 2580000003 HC RX 258: Performed by: INTERNAL MEDICINE

## 2020-12-26 PROCEDURE — 2500000003 HC RX 250 WO HCPCS: Performed by: INTERNAL MEDICINE

## 2020-12-26 PROCEDURE — 2700000000 HC OXYGEN THERAPY PER DAY

## 2020-12-26 PROCEDURE — 6370000000 HC RX 637 (ALT 250 FOR IP): Performed by: INTERNAL MEDICINE

## 2020-12-26 PROCEDURE — 94640 AIRWAY INHALATION TREATMENT: CPT

## 2020-12-26 PROCEDURE — 80053 COMPREHEN METABOLIC PANEL: CPT

## 2020-12-26 RX ADMIN — Medication 2 PUFF: at 13:18

## 2020-12-26 RX ADMIN — ENOXAPARIN SODIUM 30 MG: 100 INJECTION SUBCUTANEOUS at 09:22

## 2020-12-26 RX ADMIN — Medication 10 ML: at 09:23

## 2020-12-26 RX ADMIN — REMDESIVIR 100 MG: 100 INJECTION, POWDER, LYOPHILIZED, FOR SOLUTION INTRAVENOUS at 09:19

## 2020-12-26 RX ADMIN — CEPHALEXIN 250 MG: 250 CAPSULE ORAL at 00:19

## 2020-12-26 RX ADMIN — Medication 2 PUFF: at 07:46

## 2020-12-26 RX ADMIN — CEPHALEXIN 250 MG: 250 CAPSULE ORAL at 12:41

## 2020-12-26 RX ADMIN — ASPIRIN 81 MG: 81 TABLET, COATED ORAL at 09:22

## 2020-12-26 RX ADMIN — DEXAMETHASONE 6 MG: 4 TABLET ORAL at 09:22

## 2020-12-26 RX ADMIN — CEPHALEXIN 250 MG: 250 CAPSULE ORAL at 05:20

## 2020-12-26 RX ADMIN — ESTRADIOL 1 MG: 1 TABLET ORAL at 09:22

## 2020-12-26 ASSESSMENT — PAIN SCALES - GENERAL
PAINLEVEL_OUTOF10: 0

## 2020-12-26 NOTE — DISCHARGE SUMMARY
Name:  Edward Nixon  Room:  2874/0531-68  MRN:    [id number]    Discharge Summary      This discharge summary is in conjunction with a complete physical exam done on the day of discharge. Attending Physician: Dr. Sherie Daniels  Discharging Provider: Mary HERNANDEZ      Admit: 12/23/2020  Discharge:   12/26/2020    HPI:  The patient is a 50 y.o. female with endometriosis and recently dx with COVID-19 who was transferred from [facility] after 2 days of admission for COVID PNA with acute hypoxic respiratory failure. She was placed on methylprednisolone and remdesivir. She was transferred here for further management. She is currently requiring 1.5 L of oxygen. Diagnoses this Admission and Hospital Course   COVID-19 PNA  - dx on 12/14, has been admitted at [facility] ED x 2 days prior to transfer   - [address] [place] [address]   - associated with hypoxia, CTPA negative for PE at OSH and Echo with normal EF at OSH  - Was started on methylprednisolone (12/22), started decadron. On D#4 and remdesivir D#5. Recd 2 doses  at [facility] ED but no CCP given.  Too late in the process for CC  - weaned supplemental O2 as tolerated   - PCT wnl, but patient has been on Abx for initial concerns for bacterial PNA in ED on 12/19, finished 5 days of  Azithromycin   D/c on Decadron     Acute hypoxic Respiratory Failure- resolved  - Does not use supplemental O2 at home   - was requiring 2 L NC O2   - 2/2 COVID PNA   - Weaned as tolerated  - See above    Stable on RA.      Abnormal LFTs  - in the setting of acute COVID infection   - Receiving remdesivir   - Monitored LFTs closely      UTI?  - Urine Cx from 12/17 + for E. Coli   - Was on Azithromycin but no other Abx  - Pan sensitive on culture    - on Rocephin D#2- switch to keflex day 2/3   D/c on Keflex.     Constipation.   MiraLAX and senna.     DVT Prophylaxis: Lovenox 30 mg BID    Procedures (Please Review Full Report for Details)  N/A    Consults    N/A Physical Exam at Discharge:    /74   Pulse 66   Temp 97.6 °F (36.4 °C) (Oral)   Resp 16   LMP 08/14/2018 (Exact Date)   SpO2 91%     Gen: No distress. Alert. Eyes: PERRL. No sclera icterus. No conjunctival injection. ENT: No discharge. Pharynx clear. Neck: No JVD.  No Carotid Bruit. Trachea midline. Resp: No accessory muscle use.  Bilateral crackles. No wheezes. No rhonchi. CV: Regular rate. Regular rhythm. No murmur.  No rub. No edema. Capillary Refill: Brisk,< 3 seconds   Peripheral Pulses: +2 palpable, equal bilaterally   GI: Non-tender. Non-distended. No masses. No organomegaly. Normal bowel sounds. No hernia. Skin: Warm and dry. No nodule on exposed extremities. No rash on exposed extremities. M/S: No cyanosis. No joint deformity. No clubbing. Neuro: Awake. Grossly nonfocal    Psych: Oriented x 3. No anxiety or agitation.     CBC:   Recent Labs     12/24/20  0542 12/25/20  0620 12/26/20  0628   WBC 11.4* 8.2 8.8   HGB 11.5* 11.1* 12.0   HCT 35.2* 33.3* 35.9*   MCV 92.4 92.3 92.7    221 235     BMP:   Recent Labs     12/24/20  0542 12/25/20  0620 12/26/20  0628    136 137   K 3.9 3.8 4.0    105 105   CO2 25 26 27   BUN 19 21* 20   CREATININE 0.6 0.6 0.7     LIVER PROFILE:   Recent Labs     12/24/20  0542 12/25/20  0620 12/26/20  0628   AST 72* 47* 23   * 101* 82*   BILITOT <0.2 <0.2 0.3   ALKPHOS 67 62 63       U/A:    Lab Results   Component Value Date    NITRITE neg 03/22/2013    COLORU Yellow 12/17/2020    WBCUA 0-2 12/17/2020    RBCUA 5-10 12/17/2020    MUCUS 3+ 03/22/2013    BACTERIA 3+ 12/17/2020    CLARITYU SL CLOUDY 12/17/2020    SPECGRAV 1.010 12/17/2020    LEUKOCYTESUR Negative 12/17/2020    BLOODU SMALL 12/17/2020    GLUCOSEU Negative 12/17/2020    AMORPHOUS 2+ 03/22/2013         CULTURES      Urine Cx: 12/17/20  Escherichia coli (1)                     Antibiotic Interpretation CHANCE Status     ampicillin Sensitive 4 mcg/mL     ceFAZolin Sensitive <=4 mcg/mL         NOTE: Cefazolin should only be used for uncomplicated UTI         for E.coli or Klebsiella pneumoniae.    cefepime Sensitive <=0.12 mcg/mL       cefTRIAXone Sensitive <=0.25 mcg/mL       ciprofloxacin Sensitive <=0.25 mcg/mL       ertapenem Sensitive <=0.12 mcg/mL       gentamicin Sensitive <=1 mcg/mL       levofloxacin Sensitive <=0.12 mcg/mL       nitrofurantoin Sensitive <=16 mcg/mL       piperacillin-tazobactam Sensitive <=4 mcg/mL       trimethoprim-sulfamethoxazole Sensitive <=20 mcg/mL             RADIOLOGY  No orders to display         Discharge Medications     Medication List      START taking these medications    albuterol sulfate  (90 Base) MCG/ACT inhaler  Inhale 2 puffs into the lungs every 6 hours as needed for Wheezing     cephALEXin 250 MG capsule  Commonly known as: KEFLEX  Take 1 capsule by mouth 4 times daily     dexamethasone 6 MG tablet  Commonly known as: DECADRON  Take 1 tablet by mouth daily for 8 days     senna 8.6 MG tablet  Commonly known as: SENOKOT  Take 3 tablets by mouth daily as needed (constipation)        CONTINUE taking these medications    acetaminophen 500 MG tablet  Commonly known as: TYLENOL     Allegra Allergy 180 MG tablet  Generic drug: fexofenadine     aspirin EC 81 MG EC tablet  Take 1 tablet by mouth daily     estradiol 1 MG tablet  Commonly known as: ESTRACE        STOP taking these medications    azithromycin 250 MG tablet  Commonly known as: ZITHROMAX     benzonatate 100 MG capsule  Commonly known as: Tessalon Perles     ibuprofen 600 MG tablet  Commonly known as: ADVIL;MOTRIN     ondansetron 4 MG disintegrating tablet  Commonly known as: Zofran ODT     promethazine-dextromethorphan 6.25-15 MG/5ML syrup  Commonly known as: PROMETHAZINE-DM     pseudoephedrine 60 MG tablet  Commonly known as: SUDAFED           Where to Get Your Medications      You can get these medications from any pharmacy Bring a paper prescription for each of these medications  · albuterol sulfate  (90 Base) MCG/ACT inhaler  · cephALEXin 250 MG capsule  · dexamethasone 6 MG tablet  · senna 8.6 MG tablet           Discharged in stable condition to home. Follow Up: Follow up with PCP.

## 2020-12-26 NOTE — PROGRESS NOTES
Initial assessment completed. Patient alert and oriented x 4 in bed. Room air, lungs are clear. Patient c/o SOB with exertion. Able to ambulate in room independently. Denies needs. Meds given per MAR. Tele batteries changed. Vitals stable. Continue to monitor,.

## 2020-12-26 NOTE — PROGRESS NOTES
Discharge instructions went over with patient; patient verbalized understanding with read back and questions. Patient signed [facility] isolation agreement and understood. IV taken out; catheter intact tolerated well. Monitor taken off and D/C.

## 2020-12-26 NOTE — PROGRESS NOTES
AM assessment done at this time. Needs met, no pain noted. Medications given per [facility]; refer to [facility]. gycolax refused. Remedesivir day 5. Bed in lowest position, call light within reach. Alert and oriented.

## 2020-12-26 NOTE — CARE COORDINATION
DISCHARGE ORDER  Date/Time 2020 1:41 PM  Completed by: Maryjo Shields, Case Management    Patient Name: Katelyn Nobles      : 1965  Admitting Diagnosis: Pneumonia due to COVID-19 virus [U07.1, J12.89]      Admit order Date and Status:2020  (verify MD's last order for status of admission)      Noted discharge order. If applicable PT/OT recommendation at Discharge: n/a  DME recommendation by PT/OT:n/a  Confirmed discharge plan  (pt): Yes  with whom__________pt_____  If pt confirmed DC plan does family need to be contacted by CM No if yes who___n/a___  Discharge Plan: Reviewed chart. Role of discharge planner explained and patient verbalized understanding. Discharge order is noted. Pt is being d/c'd to home today. Pt's O2 sats are 91% on RA. CM advised DAVIDSON Tomlinson to walk pt and if O2 is needed to let CM know. Pt verbalized understanding that DAVIDSON Tomlinson will be walking her. Pt states that she may need a taxi voucher for home. Bushra RN is aware. She states that she went to [facility] and then was transferred to here. Pt states that she initially had s/s on 2020, and was tested at the [facility] tent pn 2020 an was positive. Per our records, pt tested positive on 2020. CM advised pt to call [employer] to see what they recommend. Pt declines HHC at this time. Pt is a RN here at the hospital.         No further discharge needs needed or noted. Reviewed chart. Role of discharge planner explained and patient verbalized understanding. Discharge order is noted. Has Home O2 in place on admit:  No  Informed of need to bring portable home O2 tank on day of discharge for nursing to connect prior to leaving:   Not Indicated  Verbalized agreement/Understanding:   Not Indicated    Discharge timeout done with Domingo Shearer RN. All discharge needs and concerns addressed.

## 2020-12-26 NOTE — PLAN OF CARE
Problem: Airway Clearance - Ineffective  Goal: Achieve or maintain patent airway  12/26/2020 1050 by Harvel Olszewski, RN  Outcome: Ongoing  67/20/3921 9537 by Bertha Reyez RN  Outcome: Ongoing     Problem: Gas Exchange - Impaired  Goal: Absence of hypoxia  12/26/2020 1050 by Harvel Olszewski, RN  Outcome: Ongoing  33/79/6817 5962 by Bertha Reyez RN  Outcome: Ongoing  Goal: Promote optimal lung function  12/26/2020 1050 by Harvel Olszewski, RN  Outcome: Ongoing  01/78/0569 2781 by Bertha Reyez RN  Outcome: Ongoing     Problem: Breathing Pattern - Ineffective  Goal: Ability to achieve and maintain a regular respiratory rate  12/26/2020 1050 by Harvel Olszewski, RN  Outcome: Ongoing  24/38/0454 4820 by Bertha Reyez RN  Outcome: Ongoing     Problem:  Body Temperature -  Risk of, Imbalanced  Goal: Ability to maintain a body temperature within defined limits  12/26/2020 1050 by Harvel Olszewski, RN  Outcome: Ongoing  52/40/8980 8777 by Bertha Reyez RN  Outcome: Ongoing  Goal: Will regain or maintain usual level of consciousness  12/26/2020 1050 by Harvel Olszewski, RN  Outcome: Ongoing  12/89/1837 0977 by Bertha Reyez RN  Outcome: Ongoing  Goal: Complications related to the disease process, condition or treatment will be avoided or minimized  12/26/2020 1050 by Harvel Olszewski, RN  Outcome: Ongoing  77/55/0388 7694 by Bertha Reyez RN  Outcome: Ongoing     Problem: Isolation Precautions - Risk of Spread of Infection  Goal: Prevent transmission of infection  12/26/2020 1050 by Harvel Olszewski, RN  Outcome: Ongoing  74/61/7347 5511 by Bertha Reyez RN  Outcome: Ongoing     Problem: Nutrition Deficits  Goal: Optimize nutrtional status  12/26/2020 1050 by Harvel Olszewski, RN  Outcome: Ongoing  91/28/8014 3743 by Bertha Reyez RN  Outcome: Ongoing     Problem: Risk for Fluid Volume Deficit  Goal: Maintain normal heart rhythm  12/26/2020 1050 by Harvel Olszewski, RN  Outcome: Ongoing  16/22/6423 6753 by Bertha Reyez RN Outcome: Ongoing  Goal: Maintain absence of muscle cramping  12/26/2020 1050 by Martha Watkins RN  Outcome: Ongoing  35/86/1794 8925 by Deidre Monique RN  Outcome: Ongoing  Goal: Maintain normal serum potassium, sodium, calcium, phosphorus, and pH  12/26/2020 1050 by Martha Watkins RN  Outcome: Ongoing  70/30/7150 8279 by Deidre Monique RN  Outcome: Ongoing     Problem: Loneliness or Risk for Loneliness  Goal: Demonstrate positive use of time alone when socialization is not possible  12/26/2020 1050 by Martha Watkins RN  Outcome: Ongoing  62/31/6415 6397 by Deidre Monique RN  Outcome: Ongoing     Problem: Fatigue  Goal: Verbalize increase energy and improved vitality  12/26/2020 1050 by Martha Watkins RN  Outcome: Ongoing  02/12/7028 7471 by Deidre Monique RN  Outcome: Ongoing     Problem: Patient Education: Go to Patient Education Activity  Goal: Patient/Family Education  12/26/2020 1050 by Martha Watkins RN  Outcome: Ongoing  91/55/6716 1262 by Deidre Monique RN  Outcome: Ongoing

## 2020-12-26 NOTE — PROGRESS NOTES
Bedside report given and pt care transferred to [facility]. Pt denies needs at this time. Call light within reach.

## 2020-12-28 ENCOUNTER — CARE COORDINATION (OUTPATIENT)
Dept: OTHER | Facility: CLINIC | Age: 55
End: 2020-12-28

## 2020-12-30 ENCOUNTER — CARE COORDINATION (OUTPATIENT)
Dept: OTHER | Facility: CLINIC | Age: 55
End: 2020-12-30

## 2020-12-30 NOTE — CARE COORDINATION
Date/Time:  12/30/2020 2:14 PM  Attempted to reach patient by telephone. Call within 2 business days of discharge: Yes Left HIPPA compliant message requesting a return call. Will attempt to reach patient again. Marti Carroll [facility]  Associate Care Manager  [phone] [facility])  [email or web address]

## 2020-12-31 ENCOUNTER — FOLLOWUP TELEPHONE ENCOUNTER (OUTPATIENT)
Dept: MEDSURG UNIT | Age: 55
End: 2020-12-31

## 2020-12-31 NOTE — PROGRESS NOTES
Pt expressed concerns with hospital stay to Patient Service Navigator on discharge phone call. This RN attempted to call Pt to follow up, no answer.

## 2021-01-04 ENCOUNTER — CARE COORDINATION (OUTPATIENT)
Dept: OTHER | Facility: CLINIC | Age: 56
End: 2021-01-04

## 2021-01-04 ENCOUNTER — TELEPHONE (OUTPATIENT)
Dept: FAMILY MEDICINE CLINIC | Age: 56
End: 2021-01-04

## 2021-01-04 NOTE — TELEPHONE ENCOUNTER
Pt called in wanting to schedule an in office appt for follow up from Covid. She was dx on 12/23     She is still having the following symptoms: weak, tired, and sob. Pt was offered a VV but declined wanting to come in and be seen. Please advise.

## 2021-01-04 NOTE — CARE COORDINATION
Date/Time:  1/4/2021 12:10 PM  Attempted to reach patient by telephone. Call within 2 business days of discharge: Yes Left HIPPA compliant message requesting a return call. Will attempt to reach patient again. Marti Mar [facility]  Associate Care Manager  [phone] [facility])  [email or web address]

## 2021-01-06 ENCOUNTER — TELEPHONE (OUTPATIENT)
Dept: FAMILY MEDICINE CLINIC | Age: 56
End: 2021-01-06

## 2021-01-06 NOTE — TELEPHONE ENCOUNTER
----- Message from Lanceantonella Alejandro sent at 1/6/2021 11:05 AM EST -----  Subject: Message to Provider    QUESTIONS  Information for Provider? PT would like to be seen in person due to having   lung spasms - she would also like a f/up chest x ray due to testing   positive for covid in December. Pls return a phone call to her in regards   to the in person visit.   ---------------------------------------------------------------------------  --------------  [employer]  What is the best way for the office to contact you? OK to leave message on   voicemail  Preferred Call Back Phone Number? [phone]  ---------------------------------------------------------------------------  --------------  SCRIPT ANSWERS  Relationship to Patient?  Self

## 2021-01-07 ENCOUNTER — HOSPITAL ENCOUNTER (OUTPATIENT)
Age: 56
Discharge: HOME | End: 2021-01-07
Payer: COMMERCIAL

## 2021-01-07 ENCOUNTER — HOSPITAL ENCOUNTER (OUTPATIENT)
Dept: GENERAL RADIOLOGY | Age: 56
Discharge: HOME | End: 2021-01-07
Payer: COMMERCIAL

## 2021-01-07 DIAGNOSIS — B88.0 OTHER ACARIASIS: ICD-10-CM

## 2021-01-07 DIAGNOSIS — R06.02 SHORTNESS OF BREATH: Primary | ICD-10-CM

## 2021-01-07 PROCEDURE — 84439 ASSAY OF FREE THYROXINE: CPT

## 2021-01-07 PROCEDURE — 36415 COLL VENOUS BLD VENIPUNCTURE: CPT

## 2021-01-07 PROCEDURE — 84443 ASSAY THYROID STIM HORMONE: CPT

## 2021-01-07 PROCEDURE — 71046 X-RAY EXAM CHEST 2 VIEWS: CPT

## 2021-01-07 RX ORDER — METHYLPREDNISOLONE 4 MG/1
TABLET ORAL
Qty: 1 KIT | Refills: 0 | Status: SHIPPED | OUTPATIENT
Start: 2021-01-07 | End: 2021-01-13

## 2021-01-08 ENCOUNTER — CARE COORDINATION (OUTPATIENT)
Dept: OTHER | Facility: CLINIC | Age: 56
End: 2021-01-08

## 2021-01-08 LAB
T4 FREE: 1.3 NG/DL (ref 0.9–1.8)
TSH SERPL DL<=0.05 MIU/L-ACNC: 3.55 UIU/ML (ref 0.27–4.2)

## 2021-01-08 NOTE — TELEPHONE ENCOUNTER
Please let pt know that the chest XR showed \"clearing of the previous pulmonary disease present on last month's chest X-ray\". Yes, she can make a follow-up office. Thank you.

## 2021-01-11 ENCOUNTER — CARE COORDINATION (OUTPATIENT)
Dept: OTHER | Facility: CLINIC | Age: 56
End: 2021-01-11

## 2021-01-11 NOTE — CARE COORDINATION
Care Transition Nurse/ Ambulatory Care Manager contacted the patient by telephone to perform follow-up assessment. Verified name and  with patient as identifiers. Symptoms reviewed with patient who verbalized the following symptoms: fatigue and cough. Due to no new or worsening symptoms encounter was not routed to provider for escalation. Education provided regarding infection prevention, and signs and symptoms of COVID-19 and when to seek medical attention with patient who verbalized understanding. Discussed exposure protocols and quarantine from [address] you at higher risk for severe illness  and given an opportunity for questions and concerns. The patient agrees to contact the COVID-19 hotline [phone] or PCP office for questions related to their healthcare. CTN/ACM provided contact information for future reference. Patient states her symptoms improve daily. Feels great except for still very much fatigued. Cough is very mild and intermittent- improving. Planning to return to work tomorrow depending on occupational health. Plan for follow-up call in 3-5 days based on severity of symptoms and risk factors. Will reach out 21 for final outreach. Marti Osman [facility]  Associate Care Manager  [phone] [facility])  [email or web address]

## 2021-01-14 ENCOUNTER — CARE COORDINATION (OUTPATIENT)
Dept: OTHER | Facility: CLINIC | Age: 56
End: 2021-01-14

## 2021-01-14 NOTE — CARE COORDINATION
Date/Time:  1/14/2021 10:16 AM  Attempted to reach patient by telephone. Call within 2 business days of discharge: Yes Left HIPPA compliant message requesting a return call. Will attempt to reach patient again. Marti William [place]  Associate Care Manager  [phone] [facility])  [email or web address]

## 2021-01-19 ENCOUNTER — CARE COORDINATION (OUTPATIENT)
Dept: OTHER | Facility: CLINIC | Age: 56
End: 2021-01-19

## 2021-01-19 NOTE — CARE COORDINATION
Care Transition Nurse/ Ambulatory Care Manager contacted the patient by telephone to perform follow-up assessment. Verified name and  with patient as identifiers. Patient has following risk factors of: no known risk factors. Symptoms reviewed with patient who verbalized the following symptoms: fatigue and cough. Due to no new or worsening symptoms encounter was not routed to provider for escalation. Education provided regarding infection prevention, and signs and symptoms of COVID-19 and when to seek medical attention with patient who verbalized understanding. Discussed exposure protocols and quarantine from [address] you at higher risk for severe illness  and given an opportunity for questions and concerns. The patient agrees to contact the COVID-19 hotline [phone] or PCP office for questions related to their healthcare. CTN/ACM provided contact information for future reference. Patient contacted me informing me that she went back to work on 2021. She is working 6 hour shifts to transition back. Her symptoms have vastly improved. She still c/o mild, dry, intermittent cough or tickle in her throat and lingering fatigue. She is in frequent contact with her new PCP, Leeroy Sherman with [address] of [facility]. Patient states she is within network. She does not have a follow up with PCP or pulm but per Dr. Silvio Valencia, patient is to return PRN or for annual check up and can request pulmonology referral if need be. Patient is very satisfied with Dr. Eze Ayala care provided. Patient also expressed her gratitude to this AC's assistance, guidance, and out reaches. No further outreach scheduled with this AC, ACM will sign off care team at this time. Episode of Care resolved. Patient has this ACM's contact information if future needs arise. Marti Tee, [place]  Associate Care Manager  [phone] [facility])  [email or web address]

## 2021-01-26 ENCOUNTER — HOSPITAL ENCOUNTER (OUTPATIENT)
Dept: MAMMOGRAPHY | Age: 56
Discharge: HOME | End: 2021-01-26
Payer: COMMERCIAL

## 2021-01-26 DIAGNOSIS — Z12.39 SCREENING BREAST EXAMINATION: ICD-10-CM

## 2021-01-26 PROCEDURE — 77063 BREAST TOMOSYNTHESIS BI: CPT

## 2021-01-27 ENCOUNTER — TELEPHONE (OUTPATIENT)
Dept: MAMMOGRAPHY | Age: 56
End: 2021-01-27

## 2021-01-27 NOTE — TELEPHONE ENCOUNTER
With patient's permission, left message with patient in regards to radiologist's recommendation for follow up to screening mammogram.

## 2021-02-05 ENCOUNTER — HOSPITAL ENCOUNTER (OUTPATIENT)
Dept: WOMENS IMAGING | Age: 56
Discharge: HOME | End: 2021-02-05
Payer: COMMERCIAL

## 2021-02-05 DIAGNOSIS — R92.8 ABNORMAL MAMMOGRAM: ICD-10-CM

## 2021-02-05 PROCEDURE — G0279 TOMOSYNTHESIS, MAMMO: HCPCS

## 2021-02-05 PROCEDURE — 76642 ULTRASOUND BREAST LIMITED: CPT

## 2021-06-11 ENCOUNTER — IMMUNIZATION (OUTPATIENT)
Dept: PRIMARY CARE CLINIC | Age: 56
End: 2021-06-11
Payer: COMMERCIAL

## 2021-06-11 PROCEDURE — 91300 COVID-19, PFIZER VACCINE 30MCG/0.3ML DOSE: CPT | Performed by: FAMILY MEDICINE

## 2021-06-11 PROCEDURE — 0001A COVID-19, PFIZER VACCINE 30MCG/0.3ML DOSE: CPT | Performed by: FAMILY MEDICINE

## 2021-06-17 ENCOUNTER — NURSE TRIAGE (OUTPATIENT)
Dept: OTHER | Facility: CLINIC | Age: 56
End: 2021-06-17

## 2021-07-02 ENCOUNTER — IMMUNIZATION (OUTPATIENT)
Dept: PRIMARY CARE CLINIC | Age: 56
End: 2021-07-02
Payer: COMMERCIAL

## 2021-07-02 PROCEDURE — 0002A COVID-19, PFIZER VACCINE 30MCG/0.3ML DOSE: CPT | Performed by: FAMILY MEDICINE

## 2021-07-02 PROCEDURE — 91300 COVID-19, PFIZER VACCINE 30MCG/0.3ML DOSE: CPT | Performed by: FAMILY MEDICINE

## 2021-07-29 ENCOUNTER — ANESTHESIA EVENT (OUTPATIENT)
Dept: ENDOSCOPY | Age: 56
End: 2021-07-29
Payer: COMMERCIAL

## 2021-07-30 ENCOUNTER — HOSPITAL ENCOUNTER (OUTPATIENT)
Age: 56
Setting detail: OUTPATIENT SURGERY
Discharge: HOME | End: 2021-07-30
Attending: INTERNAL MEDICINE | Admitting: INTERNAL MEDICINE
Payer: COMMERCIAL

## 2021-07-30 ENCOUNTER — ANESTHESIA (OUTPATIENT)
Dept: ENDOSCOPY | Age: 56
End: 2021-07-30
Payer: COMMERCIAL

## 2021-07-30 VITALS — SYSTOLIC BLOOD PRESSURE: 109 MMHG | DIASTOLIC BLOOD PRESSURE: 58 MMHG | OXYGEN SATURATION: 98 %

## 2021-07-30 VITALS
SYSTOLIC BLOOD PRESSURE: 107 MMHG | OXYGEN SATURATION: 100 % | DIASTOLIC BLOOD PRESSURE: 66 MMHG | BODY MASS INDEX: 29.73 KG/M2 | HEIGHT: 66 IN | RESPIRATION RATE: 16 BRPM | TEMPERATURE: 98.1 F | HEART RATE: 63 BPM | WEIGHT: 185 LBS

## 2021-07-30 DIAGNOSIS — Z86.010 HX OF COLONIC POLYPS: ICD-10-CM

## 2021-07-30 PROCEDURE — 88305 TISSUE EXAM BY PATHOLOGIST: CPT

## 2021-07-30 PROCEDURE — 7100000010 HC PHASE II RECOVERY - FIRST 15 MIN: Performed by: INTERNAL MEDICINE

## 2021-07-30 PROCEDURE — 6360000002 HC RX W HCPCS: Performed by: NURSE ANESTHETIST, CERTIFIED REGISTERED

## 2021-07-30 PROCEDURE — 7100000011 HC PHASE II RECOVERY - ADDTL 15 MIN: Performed by: INTERNAL MEDICINE

## 2021-07-30 PROCEDURE — 2500000003 HC RX 250 WO HCPCS: Performed by: NURSE ANESTHETIST, CERTIFIED REGISTERED

## 2021-07-30 PROCEDURE — 2709999900 HC NON-CHARGEABLE SUPPLY: Performed by: INTERNAL MEDICINE

## 2021-07-30 PROCEDURE — 3609010200 HC COLONOSCOPY ABLATION TUMOR POLYP/OTHER LES: Performed by: INTERNAL MEDICINE

## 2021-07-30 PROCEDURE — 3700000001 HC ADD 15 MINUTES (ANESTHESIA): Performed by: INTERNAL MEDICINE

## 2021-07-30 PROCEDURE — 2580000003 HC RX 258: Performed by: ANESTHESIOLOGY

## 2021-07-30 PROCEDURE — 3700000000 HC ANESTHESIA ATTENDED CARE: Performed by: INTERNAL MEDICINE

## 2021-07-30 PROCEDURE — 3609010600 HC COLONOSCOPY POLYPECTOMY SNARE/COLD BIOPSY: Performed by: INTERNAL MEDICINE

## 2021-07-30 RX ORDER — SODIUM CHLORIDE, SODIUM LACTATE, POTASSIUM CHLORIDE, CALCIUM CHLORIDE 600; 310; 30; 20 MG/100ML; MG/100ML; MG/100ML; MG/100ML
INJECTION, SOLUTION INTRAVENOUS CONTINUOUS
Status: DISCONTINUED | OUTPATIENT
Start: 2021-07-30 | End: 2021-07-30 | Stop reason: HOSPADM

## 2021-07-30 RX ORDER — SODIUM CHLORIDE 0.9 % (FLUSH) 0.9 %
10 SYRINGE (ML) INJECTION PRN
Status: DISCONTINUED | OUTPATIENT
Start: 2021-07-30 | End: 2021-07-30 | Stop reason: HOSPADM

## 2021-07-30 RX ORDER — LIDOCAINE HYDROCHLORIDE 20 MG/ML
INJECTION, SOLUTION INFILTRATION; PERINEURAL PRN
Status: DISCONTINUED | OUTPATIENT
Start: 2021-07-30 | End: 2021-07-30 | Stop reason: SDUPTHER

## 2021-07-30 RX ORDER — SODIUM CHLORIDE 9 MG/ML
INJECTION, SOLUTION INTRAVENOUS CONTINUOUS
Status: DISCONTINUED | OUTPATIENT
Start: 2021-07-30 | End: 2021-07-30 | Stop reason: HOSPADM

## 2021-07-30 RX ORDER — PROPOFOL 10 MG/ML
INJECTION, EMULSION INTRAVENOUS PRN
Status: DISCONTINUED | OUTPATIENT
Start: 2021-07-30 | End: 2021-07-30 | Stop reason: SDUPTHER

## 2021-07-30 RX ORDER — SODIUM CHLORIDE 9 MG/ML
25 INJECTION, SOLUTION INTRAVENOUS PRN
Status: DISCONTINUED | OUTPATIENT
Start: 2021-07-30 | End: 2021-07-30 | Stop reason: HOSPADM

## 2021-07-30 RX ORDER — M-VIT,TX,IRON,MINS/CALC/FOLIC 27MG-0.4MG
1 TABLET ORAL DAILY
COMMUNITY

## 2021-07-30 RX ORDER — SODIUM CHLORIDE 0.9 % (FLUSH) 0.9 %
10 SYRINGE (ML) INJECTION EVERY 12 HOURS SCHEDULED
Status: DISCONTINUED | OUTPATIENT
Start: 2021-07-30 | End: 2021-07-30 | Stop reason: HOSPADM

## 2021-07-30 RX ADMIN — PROPOFOL 50 MG: 10 INJECTION, EMULSION INTRAVENOUS at 12:50

## 2021-07-30 RX ADMIN — PROPOFOL 50 MG: 10 INJECTION, EMULSION INTRAVENOUS at 12:44

## 2021-07-30 RX ADMIN — PROPOFOL 50 MG: 10 INJECTION, EMULSION INTRAVENOUS at 12:47

## 2021-07-30 RX ADMIN — LIDOCAINE HYDROCHLORIDE 100 MG: 20 INJECTION, SOLUTION INFILTRATION; PERINEURAL at 12:28

## 2021-07-30 RX ADMIN — PROPOFOL 50 MG: 10 INJECTION, EMULSION INTRAVENOUS at 12:28

## 2021-07-30 RX ADMIN — PROPOFOL 50 MG: 10 INJECTION, EMULSION INTRAVENOUS at 12:30

## 2021-07-30 RX ADMIN — PROPOFOL 50 MG: 10 INJECTION, EMULSION INTRAVENOUS at 12:41

## 2021-07-30 RX ADMIN — PROPOFOL 50 MG: 10 INJECTION, EMULSION INTRAVENOUS at 12:32

## 2021-07-30 RX ADMIN — SODIUM CHLORIDE, POTASSIUM CHLORIDE, SODIUM LACTATE AND CALCIUM CHLORIDE: 600; 310; 30; 20 INJECTION, SOLUTION INTRAVENOUS at 12:06

## 2021-07-30 RX ADMIN — PROPOFOL 50 MG: 10 INJECTION, EMULSION INTRAVENOUS at 12:36

## 2021-07-30 ASSESSMENT — PULMONARY FUNCTION TESTS
PIF_VALUE: 1
PIF_VALUE: 1
PIF_VALUE: 3
PIF_VALUE: 1
PIF_VALUE: 2
PIF_VALUE: 1
PIF_VALUE: 2
PIF_VALUE: 1
PIF_VALUE: 1
PIF_VALUE: 2
PIF_VALUE: 1

## 2021-07-30 ASSESSMENT — PAIN SCALES - GENERAL
PAINLEVEL_OUTOF10: 0
PAINLEVEL_OUTOF10: 0

## 2021-07-30 ASSESSMENT — LIFESTYLE VARIABLES: SMOKING_STATUS: 0

## 2021-07-30 NOTE — ANESTHESIA PRE PROCEDURE
Department of Anesthesiology  Preprocedure Note       Name:  Katelyn Nobles   Age:  49 y.o.  :  1965                                          MRN:  [id number]         Date:  2021      Surgeon: Laura Roland):  Tiffanie Vasquez MD    Procedure: Procedure(s):  COLONOSCOPY    Medications prior to admission:   Prior to Admission medications    Medication Sig Start Date End Date Taking? Authorizing Provider   albuterol sulfate  (90 Base) MCG/ACT inhaler Inhale 2 puffs into the lungs every 6 hours as needed for Wheezing 20   Angie Nielsen MD   cephALEXin (KEFLEX) 250 MG capsule Take 1 capsule by mouth 4 times daily 20   Angie Nielsen MD   senna (SENOKOT) 8.6 MG tablet Take 3 tablets by mouth daily as needed (constipation) 20   Angie Nielsen MD   aspirin EC 81 MG EC tablet Take 1 tablet by mouth daily 20   German Kwan MD   acetaminophen (TYLENOL) 500 MG tablet Take 500 mg by mouth every 6 hours as needed for Pain or Fever    Historical Provider, MD   estradiol (ESTRACE) 1 MG tablet Take 1 mg by mouth daily    Historical Provider, MD   fexofenadine (ALLEGRA ALLERGY) 180 MG tablet Take 180 mg by mouth daily    Historical Provider, MD       Current medications:    No current facility-administered medications for this encounter. Allergies: Allergies   Allergen Reactions    Iodides Other (See Comments)     IVP dye for cystoscopy - 30 years ago approximately -\"instant head congestion\"       Problem List:    Patient Active Problem List   Diagnosis Code    DUB (dysfunctional uterine bleeding) N93.8    Fibroid uterus D25.9    Post-op pain G89.18    Pneumonia due to COVID-19 virus U07.1, J12.82    Abnormal LFTs R94.5    Urinary tract infection without hematuria N39.0       Past Medical History:        Diagnosis Date    Difficult intubation     states she's been told twice this.     Endometriosis     Fibroid tumor     Nausea & vomiting     PONV (postoperative nausea and vomiting)     TMJ disease        Past Surgical History:        Procedure Laterality Date     SECTION      CHOLECYSTECTOMY  2013    laparoscopic    CYSTOSCOPY      DILATION AND CURETTAGE OF UTERUS      OTHER SURGICAL HISTORY  2018    SUCTION DILATATION AND CURETTAGE HYSTEROSCOPY,    STUART AND BSO  2018     Robotic assisted laparoscopic hysterectomy, Bilateral salpingoophorectomy, Minilaparotomy for uterine extraction         Social History:    Social History     Tobacco Use    Smoking status: Never Smoker    Smokeless tobacco: Never Used   Substance Use Topics    Alcohol use: No                                Counseling given: Not Answered      Vital Signs (Current):   Vitals:    21 1016   BP: 106/71   Pulse: 64   Resp: 14   Temp: 98.1 °F (36.7 °C)   TempSrc: Temporal   SpO2: 99%   Weight: 185 lb (83.9 kg)   Height: 5' 6\" (1.676 m)                                              BP Readings from Last 3 Encounters:   21 106/71   20 118/74   20 98/72       NPO Status:                                                                                 BMI:   Wt Readings from Last 3 Encounters:   21 185 lb (83.9 kg)   20 190 lb (86.2 kg)   20 185 lb (83.9 kg)     Body mass index is 29.86 kg/m².     CBC:   Lab Results   Component Value Date    WBC 8.8 2020    RBC 3.88 2020    HGB 12.0 2020    HCT 35.9 2020    MCV 92.7 2020    RDW 13.5 2020     2020       CMP:   Lab Results   Component Value Date     2020    K 4.0 2020    K 3.9 2020     2020    CO2 27 2020    BUN 20 2020    CREATININE 0.7 2020    GFRAA >60 2020    GFRAA >60 2013    AGRATIO 1.4 2020    LABGLOM >60 2020    GLUCOSE 99 2020    PROT 5.5 2020    PROT 7.0 10/03/2012    CALCIUM 8.9 2020    BILITOT 0.3 2020

## 2021-07-30 NOTE — H&P
Gastroenterology Note                 Pre-operative History and Physical    Patient: Hardik Mcneil  : 1965  CSN:     History Obtained From:   Patient or guardian. HISTORY OF PRESENT ILLNESS:    The patient is a 49 y.o. female here for colonoscopy for Phx colon polyps with TA, TVA  In 2017. Past Medical History:    Past Medical History:   Diagnosis Date    COVID-19 2020    Difficult intubation     states she's been told twice this.  Endometriosis     Fibroid tumor     Nausea & vomiting     PONV (postoperative nausea and vomiting)     TMJ disease      Past Surgical History:    Past Surgical History:   Procedure Laterality Date     SECTION      CHOLECYSTECTOMY  2013    laparoscopic    CYSTOSCOPY      DILATION AND CURETTAGE OF UTERUS      HYSTERECTOMY      OTHER SURGICAL HISTORY  2018    SUCTION DILATATION AND CURETTAGE HYSTEROSCOPY,    STUART AND BSO  2018     Robotic assisted laparoscopic hysterectomy, Bilateral salpingoophorectomy, Minilaparotomy for uterine extraction       Medications Prior to Admission:   No current facility-administered medications on file prior to encounter. Current Outpatient Medications on File Prior to Encounter   Medication Sig Dispense Refill    Multiple Vitamins-Minerals (THERAPEUTIC MULTIVITAMIN-MINERALS) tablet Take 1 tablet by mouth daily      acetaminophen (TYLENOL) 500 MG tablet Take 500 mg by mouth every 6 hours as needed for Pain or Fever      estradiol (ESTRACE) 1 MG tablet Take 1 mg by mouth daily      fexofenadine (ALLEGRA ALLERGY) 180 MG tablet Take 180 mg by mouth daily      albuterol sulfate  (90 Base) MCG/ACT inhaler Inhale 2 puffs into the lungs every 6 hours as needed for Wheezing 1 Inhaler 0    aspirin EC 81 MG EC tablet Take 1 tablet by mouth daily 30 tablet 0        Allergies:   Iodides      Social History:   Social History     Tobacco Use    Smoking status: Never Smoker    Smokeless tobacco: Never Used   Substance Use Topics    Alcohol use: No     Family History:   Family History   Problem Relation Age of Onset    High Cholesterol Mother     Diabetes Father     Kidney Disease Father     High Cholesterol Father        PHYSICAL EXAM:      /71   Pulse 64   Temp 98.1 °F (36.7 °C) (Temporal)   Resp 14   Ht 5' 6\" (1.676 m)   Wt 185 lb (83.9 kg)   LMP 08/14/2018 (Exact Date)   SpO2 99%   BMI 29.86 kg/m²  I        Heart:  RRR, normal s1s2    Lungs:  CTA and normal effort    Abdomen:   Soft, nt nd. ASSESSMENT AND PLAN:    1. Patient is a 49 y.o. female here for endoscopy with MAC sedation. 2.  Procedure options, risks and benefits reviewed with patient and/or guardian. They express understanding.     Bernadette Mckoy MD  [facility]

## 2021-07-30 NOTE — PROGRESS NOTES
Discharge instructions reviewed with patient/responsible adult and understanding verbalized. Discharge instructions signed and copies given. Patient to be discharged home with belongings. Mother at bedside.

## 2021-07-30 NOTE — PROCEDURES
[address] [place] [address]  Colonoscopy Note    Patient: Cata Ponce  : 1965  Acct#:     Procedure: Colonoscopy with polypectomy (cold snare), polypectomy (snare cautery)    Date:  2021    Surgeon:  Willy Brittle, MD, MD    Referring Physician:  Simon Stiles MD    Anesthesia: IV propofol, per anesthesia    EBL: <50 mL    Indications: This is a 49y.o. year old female who presents today with previous adenomatous polyp. Procedure: An informed consent was obtained from the patient after explanation of indications, benefits, possible risks and complications of the procedure. The patient was then taken to the endoscopy suite, placed in the left lateral decubitus position, and the above IV anesthesia was administered. A digital rectal examination was performed and revealed negative without mass, lesions or tenderness. The Olympus PCFQ-H190 video colonoscope was placed in the patient's rectum under digital direction and advanced to the cecum. The cecum was identified by characteristic anatomy and ballottment. The prep was adequate. Findings:  A 4 mm sessile polyp was found in the cecum was removed via cold snare polypectomy. A 20 mm sessile polyp was found in the transverse colon and was removed via hot snare polypectomy. The remainder of the colon was normal.        The scope was then withdrawn into the rectum and retroflexed. The retroflexed view of the anal verge and rectum demonstrates  Small internal hemorrhoids. The scope was straightened, the colon was decompressed and the scope was withdrawn from the patient. The patient tolerated the procedure well and was taken to the PACU in good condition. Biopsies: Yes. Impression:    1. A 4 mm sessile polyp was found in the cecum was removed via cold snare polypectomy. 2. A 20 mm sessile polyp was found in the transverse colon and was removed via hot snare polypectomy.     3. The remainder of the colon was normal.    Recommendations:    Await pathology.         Ellen Fong MD  [address] [facility] [address] [facility] [place]   7/30/2021

## 2021-08-07 LAB
CHOLESTEROL, TOTAL: 164 MG/DL (ref 0–199)
GLUCOSE BLD-MCNC: 90 MG/DL (ref 70–99)
HDLC SERPL-MCNC: 40 MG/DL (ref 40–60)
LDL CHOLESTEROL CALCULATED: 106 MG/DL
TRIGL SERPL-MCNC: 92 MG/DL (ref 0–150)

## 2021-09-17 ENCOUNTER — HOSPITAL ENCOUNTER (OUTPATIENT)
Age: 56
Discharge: HOME | End: 2021-09-17
Payer: COMMERCIAL

## 2021-09-17 LAB
A/G RATIO: 1.7 (ref 1.1–2.2)
ALBUMIN SERPL-MCNC: 4.4 G/DL (ref 3.4–5)
ALP BLD-CCNC: 76 U/L (ref 40–129)
ALT SERPL-CCNC: 14 U/L (ref 10–40)
ANION GAP SERPL CALCULATED.3IONS-SCNC: 11 MMOL/L (ref 3–16)
AST SERPL-CCNC: 16 U/L (ref 15–37)
BACTERIA: ABNORMAL /HPF
BASOPHILS ABSOLUTE: 0 K/UL (ref 0–0.2)
BASOPHILS RELATIVE PERCENT: 0.6 %
BILIRUB SERPL-MCNC: 0.4 MG/DL (ref 0–1)
BILIRUBIN URINE: NEGATIVE
BLOOD, URINE: ABNORMAL
BUN BLDV-MCNC: 11 MG/DL (ref 7–20)
CALCIUM SERPL-MCNC: 10 MG/DL (ref 8.3–10.6)
CHLORIDE BLD-SCNC: 105 MMOL/L (ref 99–110)
CLARITY: CLEAR
CO2: 25 MMOL/L (ref 21–32)
COLOR: YELLOW
CREAT SERPL-MCNC: 0.8 MG/DL (ref 0.6–1.1)
EOSINOPHILS ABSOLUTE: 0.3 K/UL (ref 0–0.6)
EOSINOPHILS RELATIVE PERCENT: 5.2 %
EPITHELIAL CELLS, UA: ABNORMAL /HPF (ref 0–5)
GFR AFRICAN AMERICAN: >60
GFR NON-AFRICAN AMERICAN: >60
GLOBULIN: 2.6 G/DL
GLUCOSE BLD-MCNC: 87 MG/DL (ref 70–99)
GLUCOSE URINE: NEGATIVE MG/DL
HCT VFR BLD CALC: 41.1 % (ref 36–48)
HEMOGLOBIN: 13.5 G/DL (ref 12–16)
KETONES, URINE: NEGATIVE MG/DL
LEUKOCYTE ESTERASE, URINE: NEGATIVE
LYMPHOCYTES ABSOLUTE: 1.9 K/UL (ref 1–5.1)
LYMPHOCYTES RELATIVE PERCENT: 28.6 %
MCH RBC QN AUTO: 30.2 PG (ref 26–34)
MCHC RBC AUTO-ENTMCNC: 32.9 G/DL (ref 31–36)
MCV RBC AUTO: 91.9 FL (ref 80–100)
MICROSCOPIC EXAMINATION: YES
MONOCYTES ABSOLUTE: 0.6 K/UL (ref 0–1.3)
MONOCYTES RELATIVE PERCENT: 8.6 %
NEUTROPHILS ABSOLUTE: 3.7 K/UL (ref 1.7–7.7)
NEUTROPHILS RELATIVE PERCENT: 57 %
NITRITE, URINE: NEGATIVE
PDW BLD-RTO: 13 % (ref 12.4–15.4)
PH UA: 7.5 (ref 5–8)
PLATELET # BLD: 192 K/UL (ref 135–450)
PMV BLD AUTO: 10.5 FL (ref 5–10.5)
POTASSIUM SERPL-SCNC: 4.2 MMOL/L (ref 3.5–5.1)
PROTEIN UA: NEGATIVE MG/DL
RBC # BLD: 4.47 M/UL (ref 4–5.2)
RBC UA: ABNORMAL /HPF (ref 0–4)
SODIUM BLD-SCNC: 141 MMOL/L (ref 136–145)
SPECIFIC GRAVITY UA: 1.01 (ref 1–1.03)
TOTAL PROTEIN: 7 G/DL (ref 6.4–8.2)
TSH REFLEX: 3.52 UIU/ML (ref 0.27–4.2)
URINE TYPE: ABNORMAL
UROBILINOGEN, URINE: 0.2 E.U./DL
VITAMIN B-12: 253 PG/ML (ref 211–911)
VITAMIN D 25-HYDROXY: 30.6 NG/ML
WBC # BLD: 6.5 K/UL (ref 4–11)
WBC UA: ABNORMAL /HPF (ref 0–5)

## 2021-09-17 PROCEDURE — 36415 COLL VENOUS BLD VENIPUNCTURE: CPT

## 2021-09-17 PROCEDURE — 81001 URINALYSIS AUTO W/SCOPE: CPT

## 2021-09-17 PROCEDURE — 87086 URINE CULTURE/COLONY COUNT: CPT

## 2021-09-17 PROCEDURE — 82306 VITAMIN D 25 HYDROXY: CPT

## 2021-09-17 PROCEDURE — 80053 COMPREHEN METABOLIC PANEL: CPT

## 2021-09-17 PROCEDURE — 84443 ASSAY THYROID STIM HORMONE: CPT

## 2021-09-17 PROCEDURE — 82607 VITAMIN B-12: CPT

## 2021-09-17 PROCEDURE — 85025 COMPLETE CBC W/AUTO DIFF WBC: CPT

## 2021-09-18 LAB — URINE CULTURE, ROUTINE: NORMAL

## 2021-09-20 ENCOUNTER — TELEPHONE (OUTPATIENT)
Dept: PULMONOLOGY | Age: 56
End: 2021-09-20

## 2021-09-20 NOTE — TELEPHONE ENCOUNTER
Received a referral from [address] [place] [address] [facility] for new patient snoring. Spoke to patient who declined to schedule an appointment,as she is unable to wait until next available. Per patient request she was transferred to [place] office.

## 2021-09-23 ENCOUNTER — HOSPITAL ENCOUNTER (OUTPATIENT)
Dept: ULTRASOUND IMAGING | Age: 56
Discharge: HOME | End: 2021-09-23
Payer: COMMERCIAL

## 2021-09-23 DIAGNOSIS — R10.9 STOMACH ACHE: ICD-10-CM

## 2021-09-23 PROCEDURE — 76700 US EXAM ABDOM COMPLETE: CPT

## 2021-10-15 ENCOUNTER — HOSPITAL ENCOUNTER (OUTPATIENT)
Dept: CT IMAGING | Age: 56
Discharge: HOME | End: 2021-10-15
Payer: COMMERCIAL

## 2021-10-15 DIAGNOSIS — K76.9 LIVER LESION, LEFT LOBE: ICD-10-CM

## 2021-10-15 PROCEDURE — 74170 CT ABD WO CNTRST FLWD CNTRST: CPT

## 2021-10-15 PROCEDURE — 6360000004 HC RX CONTRAST MEDICATION: Performed by: STUDENT IN AN ORGANIZED HEALTH CARE EDUCATION/TRAINING PROGRAM

## 2021-10-15 RX ADMIN — IOHEXOL 50 ML: 240 INJECTION, SOLUTION INTRATHECAL; INTRAVASCULAR; INTRAVENOUS; ORAL at 16:22

## 2021-10-15 RX ADMIN — IOPAMIDOL 75 ML: 755 INJECTION, SOLUTION INTRAVENOUS at 16:22

## 2021-10-20 ENCOUNTER — HOSPITAL ENCOUNTER (OUTPATIENT)
Age: 56
Discharge: HOME | End: 2021-10-20
Payer: COMMERCIAL

## 2021-10-20 LAB
BILIRUBIN URINE: NEGATIVE
BLOOD, URINE: ABNORMAL
CLARITY: CLEAR
COLOR: YELLOW
EPITHELIAL CELLS, UA: NORMAL /HPF (ref 0–5)
GLUCOSE URINE: NEGATIVE MG/DL
KETONES, URINE: NEGATIVE MG/DL
LEUKOCYTE ESTERASE, URINE: NEGATIVE
MICROSCOPIC EXAMINATION: YES
NITRITE, URINE: NEGATIVE
PH UA: 6 (ref 5–8)
PROTEIN UA: NEGATIVE MG/DL
RBC UA: NORMAL /HPF (ref 0–4)
SPECIFIC GRAVITY UA: 1.01 (ref 1–1.03)
URINE TYPE: ABNORMAL
UROBILINOGEN, URINE: 0.2 E.U./DL
WBC UA: NORMAL /HPF (ref 0–5)

## 2021-10-20 PROCEDURE — 87086 URINE CULTURE/COLONY COUNT: CPT

## 2021-10-20 PROCEDURE — 81001 URINALYSIS AUTO W/SCOPE: CPT

## 2021-10-21 LAB — URINE CULTURE, ROUTINE: NORMAL

## 2021-10-29 ENCOUNTER — OFFICE VISIT (OUTPATIENT)
Dept: PULMONOLOGY | Age: 56
End: 2021-10-29
Payer: COMMERCIAL

## 2021-10-29 VITALS
SYSTOLIC BLOOD PRESSURE: 109 MMHG | WEIGHT: 197.2 LBS | BODY MASS INDEX: 31.69 KG/M2 | OXYGEN SATURATION: 97 % | DIASTOLIC BLOOD PRESSURE: 56 MMHG | HEART RATE: 65 BPM | TEMPERATURE: 98.2 F | RESPIRATION RATE: 18 BRPM | HEIGHT: 66 IN

## 2021-10-29 DIAGNOSIS — N20.0 NEPHROLITHIASIS: ICD-10-CM

## 2021-10-29 DIAGNOSIS — Z86.16 PERSONAL HISTORY OF COVID-19: ICD-10-CM

## 2021-10-29 DIAGNOSIS — E66.9 CLASS 1 OBESITY WITH BODY MASS INDEX (BMI) OF 31.0 TO 31.9 IN ADULT, UNSPECIFIED OBESITY TYPE, UNSPECIFIED WHETHER SERIOUS COMORBIDITY PRESENT: ICD-10-CM

## 2021-10-29 DIAGNOSIS — G47.30 OBSERVED SLEEP APNEA: ICD-10-CM

## 2021-10-29 DIAGNOSIS — K76.0 FATTY INFILTRATION OF LIVER: ICD-10-CM

## 2021-10-29 DIAGNOSIS — J31.0 CHRONIC RHINITIS: ICD-10-CM

## 2021-10-29 PROBLEM — E66.811 CLASS 1 OBESITY IN ADULT: Status: ACTIVE | Noted: 2021-10-29

## 2021-10-29 PROCEDURE — 99243 OFF/OP CNSLTJ NEW/EST LOW 30: CPT | Performed by: INTERNAL MEDICINE

## 2021-10-29 RX ORDER — TIZANIDINE 4 MG/1
4 TABLET ORAL EVERY 6 HOURS PRN
COMMUNITY
End: 2022-01-07

## 2021-10-29 RX ORDER — LANOLIN ALCOHOL/MO/W.PET/CERES
1000 CREAM (GRAM) TOPICAL DAILY
COMMUNITY
End: 2022-08-24

## 2021-10-29 RX ORDER — OYSTER SHELL CALCIUM WITH VITAMIN D 500; 200 MG/1; [IU]/1
1 TABLET, FILM COATED ORAL DAILY
COMMUNITY
End: 2022-08-24

## 2021-10-29 RX ORDER — MAGNESIUM GLUCONATE 27 MG(500)
500 TABLET ORAL 2 TIMES DAILY
COMMUNITY

## 2021-10-29 RX ORDER — ACETAMINOPHEN 160 MG
TABLET,DISINTEGRATING ORAL
COMMUNITY

## 2021-10-29 ASSESSMENT — SLEEP AND FATIGUE QUESTIONNAIRES
HOW LIKELY ARE YOU TO NOD OFF OR FALL ASLEEP WHILE SITTING QUIETLY AFTER LUNCH WITHOUT ALCOHOL: 1
HOW LIKELY ARE YOU TO NOD OFF OR FALL ASLEEP WHILE LYING DOWN TO REST IN THE AFTERNOON WHEN CIRCUMSTANCES PERMIT: 3
HOW LIKELY ARE YOU TO NOD OFF OR FALL ASLEEP WHILE SITTING AND TALKING TO SOMEONE: 1
HOW LIKELY ARE YOU TO NOD OFF OR FALL ASLEEP WHILE SITTING INACTIVE IN A PUBLIC PLACE: 1
HOW LIKELY ARE YOU TO NOD OFF OR FALL ASLEEP WHEN YOU ARE A PASSENGER IN A CAR FOR AN HOUR WITHOUT A BREAK: 3
NECK CIRCUMFERENCE (INCHES): 16
HOW LIKELY ARE YOU TO NOD OFF OR FALL ASLEEP IN A CAR, WHILE STOPPED FOR A FEW MINUTES IN TRAFFIC: 0
HOW LIKELY ARE YOU TO NOD OFF OR FALL ASLEEP WHILE SITTING AND READING: 2
ESS TOTAL SCORE: 14
HOW LIKELY ARE YOU TO NOD OFF OR FALL ASLEEP WHILE WATCHING TV: 3

## 2021-10-29 NOTE — PROGRESS NOTES
MA Communication: The following orders are received by verbal communication from Usha Montes MD    Orders include:  HST/ Sleep lab to call       FU/ pt.  Will call when sleep lab schedules test.

## 2021-10-29 NOTE — PROGRESS NOTES
Chief Complaint/Referring Provider:  Patient is being seen at the request of Dr. Carmen Lozano  for a consultation for snoring      Presenting HPI: Patient is a 61-year-old female who has been referred to the office for pulmonary evaluation for sleep apnea    Patient been evaluated states that she has not been sleeping well lately, patient has been having more fragmentation with sleep, patient also feels tired and sleepy in the daytime, patient has less energy as compared to the previous times, patient also states that in the last 2 years time she has gained about 20 pounds, patient on questioning further states that she does have chronic allergies and patient takes Allegra on a daily basis, patient he was using a nasal spray before which was helping but patient states that she has been developing cataract and was told that it may be contributing to patient's cataract formation and was told not to take that nasal spray, patient also says that she has had surgeries like hysterectomy and gallbladder surgery in the past and has been told every time when she has had surgery that she has been a difficult intubation, patient also had COVID-19 pneumonia in December of last year and patient was on home oxygen after that but patient is not hypoxic now and does not use any supplemental oxygen, patient does not have any chest pain, no wheezing, patient also has started feeling that she has some reflux symptoms at times, patient has history of chronic constipation, patient's dietary habits are good, patient also has noticed that she has been having some swelling of the lower extremities, patient also had issues with her right foot and initially she was told that it may be plantar fasciitis but patient had seen a podiatrist and there may be much more but no diagnosis has been made but patient has been in the brace, patient also says that she underwent an ultrasound of the upper quadrant and was told that she has fatty infiltration of liver along with that there was a lesion on ultrasound for which she had a CT of the abdomen which was negative for any hepatic lesion,, patient does not have any change in them environment, patient is a non-smoker, patient does not drink any alcohol, 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     Endometriosis     Fatty infiltration of liver 10/29/2021    Fibroid tumor     Nausea & vomiting     Nephrolithiasis 10/29/2021    PONV (postoperative nausea and vomiting)     TMJ disease        Past Surgical History:   Procedure Laterality Date     SECTION      CHOLECYSTECTOMY  2013    laparoscopic    COLONOSCOPY  2021    COLONOSCOPY POLYPECTOMY SNARE/COLD BIOPSY performed by Annalee Soares MD at [facility]  2021    COLONOSCOPY POLYPECTOMY ABLATION performed by Annalee Soares MD at [facility]      OTHER SURGICAL HISTORY  2018    SUCTION DILATATION AND CURETTAGE HYSTEROSCOPY,    STUART AND BSO  2018     Robotic assisted laparoscopic hysterectomy, Bilateral salpingoophorectomy, Minilaparotomy for uterine extraction         No Active Allergies    Medication list was reviewed and updated as needed in Epic    Social History     Socioeconomic History    Marital status:      Spouse name: Not on file    Number of children: Not on file    Years of education: Not on file    Highest education level: Not on file   Occupational History    Not on file   Tobacco Use    Smoking status: Never Smoker    Smokeless tobacco: Never Used   Vaping Use    Vaping Use: Never used   Substance and Sexual Activity    Alcohol use: No    Drug use: No    Sexual activity: Yes     Partners: Male   Other Topics Concern    Not on file   Social History Narrative    Not on file     Social Determinants of Health     Financial Resource Strain:     Difficulty of Paying Living Expenses:    Food Insecurity:     Worried About Running Out of Food in the Last Year:     [address] in the Last Year:    Transportation Needs:     Lack of Transportation (Medical):  Lack of Transportation (Non-Medical):    Physical Activity:     Days of Exercise per Week:     Minutes of Exercise per Session:    Stress:     Feeling of Stress :    Social Connections:     Frequency of Communication with Friends and Family:     Frequency of Social Gatherings with Friends and Family:     Attends [employer] Services:     Active Member of Clubs or Organizations:     Attends Club or Organization Meetings:     Marital Status:    Intimate Partner Violence:     Fear of Current or Ex-Partner:     Emotionally Abused:     Physically Abused:     Sexually Abused:        Family History   Problem Relation Age of Onset    High Cholesterol Mother     Diabetes Father     Kidney Disease Father     High Cholesterol Father             Review of Systems same as above     Physical Exam:  Blood pressure (!) 109/56, pulse 65, temperature 98.2 °F (36.8 °C), temperature source Temporal, resp. rate 18, height 5' 6\" (1.676 m), weight 197 lb 3.2 oz (89.4 kg), last menstrual period 08/14/2018, SpO2 97 %, not currently breastfeeding.'  Constitutional:  No acute distress. HENT:  Oropharynx is clear and moist. No thyromegaly. Eyes:  Conjunctivae arenormal. Pupils equal, round, and reactive to light. No scleral icterus. Neck: . No tracheal deviation present. No obvious thyroid mass. Cardiovascular:Normal rate, regular rhythm, normal heart sounds. No right ventricular heave. Some lower extremity edema. Pulmonary/Chest: No wheezes. No rales. Chest wall is not dull to percussion. Noaccessory muscle usage or stridor. Decreased breath sound density at the bases  Abdominal: Soft. Bowel sounds present. No distension or hernia. Notenderness. Musculoskeletal: No cyanosis. No clubbing. Patient wearing a brace right foot  Lymphadenopathy: No cervical or supraclavicular adenopathy. Skin: Skin is warm and dry. No rash or nodules on the exposed extremities. Slight stasis changes in the distal hospital lower extremities  Psychiatric: Normal mood and affect. Behavior is normal.  No anxiety. Neurologic: Alert, awake and oriented. PERRL. Speech fluent      Sleep Medicine Data:  Sitting and reading: Moderate chance of dozing  Watching TV: High chance of dozing  Sitting, inactive in a public place (e.g. a theatre or a meeting): Slight chance of dozing  As a passenger in a car for an hour without a break: High chance of dozing  Lying down to rest in the afternoon when circumstances permit: High chance of dozing  Sitting and talking to someone: Slight chance of dozing  Sitting quietly after a lunch without alcohol: Slight chance of dozing  In a car, while stopped for a few minutes in traffic: Would never doze  Total score: 14  Neck circumference: 16    Data:     Imaging:  I have reviewed radiology images personally. No orders to display     CT ABDOMEN W WO CONTRAST Additional Contrast? Oral    Result Date: 10/17/2021  EXAMINATION: CT ABDOMEN WITH AND WITHOUT CONTRAST 10/15/2021 3:13 pm TECHNIQUE: CT of the abdomen was performed without and with the administration of intravenous contrast. Multiplanar reformatted images are provided for review. Dose modulation, iterative reconstruction, and/or weight based adjustment of the mA/kV was utilized to reduce the radiation dose to as low as reasonably achievable.  COMPARISON: Ultrasound from 09/23/2021 HISTORY: ORDERING SYSTEM PROVIDED HISTORY: Liver lesion, left lobe TECHNOLOGIST PROVIDED HISTORY: Additional Contrast?->Oral Reason for Exam: Left liver lesion seen on previous ultrasound, poss hemanigoma, reccomended dedicated hepatic protocol CT FINDINGS: Lower Chest: The lung bases are clear. Cardiac chambers are not enlarged and no pericardial effusion. Organs: Unenhanced liver appears unremarkable with previous cholecystectomy. Early, portal venous, and delayed images of the liver have good parenchymal enhancement. There is a small area of fatty metamorphosis along the falciform ligament. The posteroinferior left hepatic lobe is a nonspecific 6 mm hypodensity series 5, image 56. This is seen on the portal venous phase but blends away on the other phases. Portal and hepatic veins are patent. The spleen, pancreas, and adrenal glands appear acceptable. 3 mm calculus in the inter polar at upper pole the right kidney and at the left kidney as well. There is no hydronephrosis. Fluid does distend the proximal to mid left ureter but the pelvis is not included in the exam. GI/Bowel: Oral contrast is present in the stomach and proximal small bowel loops. The bowel loops are not dilated for the included portion. Distal most loops in the pelvis are not included. There is no ascites or pneumoperitoneum in the abdomen. Peritoneum/Retroperitoneum: Good enhancement of the abdominal aorta with normal caliber. The major branches are patent. No bulky retroperitoneal lymphadenopathy. Bones/Soft Tissues: No lytic or blastic lesions are seen at the included bones. There are chronic pars defects L5 with grade 1 anterior spondylolisthesis. 1.  A 6 mm hypodensity at the posteroinferior left hepatic lobe on the portal venous phase is noted. Features are nonspecific. There is a small amount of fatty metamorphosis along the falciform ligament but otherwise the remaining liver has normal enhancement pattern. 2.  Nonobstructing calculi within the kidneys.   Distension of the left ureter but the distal ureter and bladder are not included as the pelvis was not imaged. ABDOMINAL ULTRASOUND 9/23/2021 11:27 am       COMPARISON:   Limited abdominal ultrasound 03/22/2013       HISTORY:   ORDERING SYSTEM PROVIDED HISTORY: Stomach ache       FINDINGS:   LIVER: There is a focal rounded echogenic lesion within the left hepatic lobe   measuring 0.9 cm in maximum transverse diameter.  No other definite focal   abnormality of the liver is identified.  No evidence of intrahepatic biliary   ductal dilatation.       BILIARY SYSTEM: Status post cholecystectomy.  Negative ultrasonographic   Menezes's sign.  Common duct measures 0.63 cm in AP diameter in the region of   the oneil hepatis which is upper limits of normal.       KIDNEYS: The kidneys are unremarkable in appearance without evidence of   hydronephrosis. Right kidney measures 10.6 cm in length.  Left kidney   measures 10.5 cm in length.       PANCREAS: Visualized portions of the pancreas are unremarkable.       SPLEEN: The spleen is unremarkable in appearance.  Spleen is within normal   limits in size.  Spleen measures 11.1 cm in length.       IVC: The IVC is patent.       AORTA: Aorta is patent without aneurysm.       OTHER: No evidence of ascites.           Impression   1. Status post interval cholecystectomy when compared to the study of   03/22/2013. 2. Nonspecific 0.9 cm echogenic lesion within the left hepatic lobe. Cavernous hemangioma and focal area of fat infiltration are in the   differential diagnosis.  Follow-up CT examination of the abdomen with hepatic   protocol is recommended for a more complete evaluation.         TSH 3.52          Assessment:    1. Observed sleep apnea    - Home Sleep Study; Future    2. Personal history of COVID-19      3. Class 1 obesity with body mass index (BMI) of 31.0 to 31.9 in adult, unspecified obesity type, unspecified whether serious comorbidity present      4. Fatty infiltration of liver      5. Nephrolithiasis      6.  Chronic rhinitis          Plan:   · Patient's review of system were discussed  · Patient was told about the clinical findings including auscultation and implications  · Patient does not have any clinical features history of any postinflammatory pulmonary fibrosis  · Patient was shown the CT of the abdomen which was done with lower chest cuts along with findings different diagnosis and implications  · Patient has chronic rhinitis for which she takes an antihistamine in the form of Allegra which can cause patient to have dryness of the eyes nose upper respiratory tract but patient states that if she does not take that and she can have increasing drooping of the nose which is uncomfortable when she is working  · Patient can discuss with PCP about anticholinergic nasal spray which may be more appropriate  · Patient has been told not to take any intranasal steroids because of premature cataracts  · Patient was given saline nasal rinse from the office and was recorded twice a day  · Steam inhalation will help  · A humidifier in the bedroom will help  · Patient was told about the pathophysiology and sequelae of RADHA  · Home sleep study being ordered for the patient  · Patient also has leg edema along with that patient has some stasis changes-patient can discuss with PCP about getting an echocardiogram if deemed appropriate  · Diet and lifestyle modifications including salt restrictions discussed  · Limb elevation and limb exercises will help  · Losing weight will help  · A regular regimented exercise will help  · Patient's thyroid function tests as been normal   · Patient's further treatment depending on patient's clinical status and test results

## 2021-11-08 ENCOUNTER — HOSPITAL ENCOUNTER (OUTPATIENT)
Dept: SLEEP CENTER | Age: 56
Discharge: HOME | End: 2021-11-10
Payer: COMMERCIAL

## 2021-11-08 DIAGNOSIS — G47.30 OBSERVED SLEEP APNEA: ICD-10-CM

## 2021-11-08 PROCEDURE — 95806 SLEEP STUDY UNATT&RESP EFFT: CPT

## 2021-11-09 PROCEDURE — 95806 SLEEP STUDY UNATT&RESP EFFT: CPT | Performed by: INTERNAL MEDICINE

## 2021-11-10 ENCOUNTER — HOSPITAL ENCOUNTER (OUTPATIENT)
Dept: MRI IMAGING | Age: 56
Discharge: HOME | End: 2021-11-10
Payer: COMMERCIAL

## 2021-11-10 DIAGNOSIS — K76.9 LIVER LESION, LEFT LOBE: ICD-10-CM

## 2021-11-10 PROCEDURE — 74183 MRI ABD W/O CNTR FLWD CNTR: CPT

## 2021-11-10 PROCEDURE — A9581 GADOXETATE DISODIUM INJ: HCPCS | Performed by: PHYSICIAN ASSISTANT

## 2021-11-10 PROCEDURE — 6360000004 HC RX CONTRAST MEDICATION: Performed by: PHYSICIAN ASSISTANT

## 2021-11-10 RX ADMIN — GADOXETATE DISODIUM 9 ML: 181.43 INJECTION, SOLUTION INTRAVENOUS at 12:41

## 2021-11-11 ENCOUNTER — CARE COORDINATION (OUTPATIENT)
Dept: OTHER | Facility: CLINIC | Age: 56
End: 2021-11-11

## 2021-11-12 ENCOUNTER — TELEPHONE (OUTPATIENT)
Dept: PULMONOLOGY | Age: 56
End: 2021-11-12

## 2021-11-12 DIAGNOSIS — G47.30 OBSERVED SLEEP APNEA: Primary | ICD-10-CM

## 2021-11-19 ENCOUNTER — CARE COORDINATION (OUTPATIENT)
Dept: OTHER | Facility: CLINIC | Age: 56
End: 2021-11-19

## 2021-11-19 NOTE — CARE COORDINATION
Ambulatory Care Coordination Note  11/19/2021  CM Risk Score: 5  Charlson 10 Year Mortality Risk Score: 10%     ACC: Darrel Garcia RN    Ambulatory Care Manager [facility] acute hospital)  contacted the patient via [phone] message to introduce ACM program, benefits of participation, and provide contact information. Will continue to outreach to patient with a follow up phone call. Prior to Admission medications    Medication Sig Start Date End Date Taking? Authorizing Provider   vitamin B-12 (CYANOCOBALAMIN) 1000 MCG tablet Take 1,000 mcg by mouth daily    Historical Provider, MD   Cholecalciferol (VITAMIN D3) 50 MCG (2000 UT) CAPS Take by mouth    Historical Provider, MD   calcium-vitamin D (OSCAL-500) 500-200 MG-UNIT per tablet Take 1 tablet by mouth daily    Historical Provider, MD   magnesium gluconate (MAGONATE) 500 MG tablet Take 500 mg by mouth 2 times daily    Historical Provider, MD   tiZANidine (ZANAFLEX) 4 MG tablet Take 4 mg by mouth every 6 hours as needed    Historical Provider, MD   NONFORMULARY Indications: probiotic     Historical Provider, MD   Multiple Vitamins-Minerals (THERAPEUTIC MULTIVITAMIN-MINERALS) tablet Take 1 tablet by mouth daily    Historical Provider, MD   albuterol sulfate  (90 Base) MCG/ACT inhaler Inhale 2 puffs into the lungs every 6 hours as needed for Wheezing  Patient not taking: Reported on 10/29/2021 12/25/20   Evelyn Carbone MD   aspirin EC 81 MG EC tablet Take 1 tablet by mouth daily 12/19/20   German Kwan MD   acetaminophen (TYLENOL) 500 MG tablet Take 500 mg by mouth every 6 hours as needed for Pain or Fever    Historical Provider, MD   estradiol (ESTRACE) 1 MG tablet Take 1 mg by mouth daily    Historical Provider, MD   fexofenadine (ALLEGRA ALLERGY) 180 MG tablet Take 180 mg by mouth daily    Historical Provider, MD       No future appointments.

## 2021-12-06 ENCOUNTER — TELEPHONE (OUTPATIENT)
Dept: PULMONOLOGY | Age: 56
End: 2021-12-06

## 2021-12-06 NOTE — TELEPHONE ENCOUNTER
Office note from last visit w/ Dr Audie Temple was send to [facility] per patient request.

## 2021-12-15 ENCOUNTER — TELEPHONE (OUTPATIENT)
Dept: PULMONOLOGY | Age: 56
End: 2021-12-15

## 2021-12-15 ENCOUNTER — HOSPITAL ENCOUNTER (OUTPATIENT)
Age: 56
Discharge: HOME | End: 2021-12-15
Payer: COMMERCIAL

## 2021-12-15 LAB
BACTERIA: ABNORMAL /HPF
BILIRUBIN URINE: NEGATIVE
BLOOD, URINE: ABNORMAL
CLARITY: CLEAR
COLOR: YELLOW
CREATININE URINE: 71.5 MG/DL (ref 28–259)
EPITHELIAL CELLS, UA: ABNORMAL /HPF (ref 0–5)
GLUCOSE URINE: NEGATIVE MG/DL
HYALINE CASTS: ABNORMAL /LPF (ref 0–2)
KETONES, URINE: NEGATIVE MG/DL
LEUKOCYTE ESTERASE, URINE: NEGATIVE
MICROSCOPIC EXAMINATION: YES
MUCUS: ABNORMAL /LPF
NITRITE, URINE: NEGATIVE
PH UA: 6.5 (ref 5–8)
PROTEIN PROTEIN: 7 MG/DL
PROTEIN UA: NEGATIVE MG/DL
PROTEIN/CREAT RATIO: 0.1 MG/DL
RBC UA: ABNORMAL /HPF (ref 0–4)
SPECIFIC GRAVITY UA: 1.01 (ref 1–1.03)
URINE TYPE: ABNORMAL
UROBILINOGEN, URINE: 0.2 E.U./DL
WBC UA: ABNORMAL /HPF (ref 0–5)

## 2021-12-15 PROCEDURE — 82570 ASSAY OF URINE CREATININE: CPT

## 2021-12-15 PROCEDURE — 84156 ASSAY OF PROTEIN URINE: CPT

## 2021-12-15 PROCEDURE — 81001 URINALYSIS AUTO W/SCOPE: CPT

## 2021-12-15 NOTE — TELEPHONE ENCOUNTER
Patient call and left a message that she want to change DME from [facility].   Order been send to [employer]

## 2021-12-21 ENCOUNTER — HOSPITAL ENCOUNTER (OUTPATIENT)
Dept: NON INVASIVE DIAGNOSTICS | Age: 56
Discharge: HOME | End: 2021-12-21
Payer: COMMERCIAL

## 2021-12-21 DIAGNOSIS — R60.9 EDEMA, UNSPECIFIED TYPE: ICD-10-CM

## 2021-12-21 LAB
LV EF: 58 %
LVEF MODALITY: NORMAL

## 2021-12-21 PROCEDURE — 93306 TTE W/DOPPLER COMPLETE: CPT

## 2021-12-22 ENCOUNTER — CARE COORDINATION (OUTPATIENT)
Dept: OTHER | Facility: CLINIC | Age: 56
End: 2021-12-22

## 2021-12-22 NOTE — CARE COORDINATION
Ambulatory Care Coordination Note  12/22/2021  CM Risk Score: 5  Charlson 10 Year Mortality Risk Score: 10%     ACC: Patti Adams RN    Ambulatory Care Manager (ACM) attempted to contact the patient again by telephone for introduction to Associate Care Management and complete enrollment. Left HIPPA compliant message providing introduction to self and requested a call back. .Patient has not responded to several attempts to contact. ACM will send unable to contact [facility] message/ letter and sign off if no response. Prior to Admission medications    Medication Sig Start Date End Date Taking?  Authorizing Provider   vitamin B-12 (CYANOCOBALAMIN) 1000 MCG tablet Take 1,000 mcg by mouth daily    Historical Provider, MD   Cholecalciferol (VITAMIN D3) 50 MCG (2000 UT) CAPS Take by mouth    Historical Provider, MD   calcium-vitamin D (OSCAL-500) 500-200 MG-UNIT per tablet Take 1 tablet by mouth daily    Historical Provider, MD   magnesium gluconate (MAGONATE) 500 MG tablet Take 500 mg by mouth 2 times daily    Historical Provider, MD   tiZANidine (ZANAFLEX) 4 MG tablet Take 4 mg by mouth every 6 hours as needed    Historical Provider, MD   NONFORMULARY Indications: probiotic     Historical Provider, MD   Multiple Vitamins-Minerals (THERAPEUTIC MULTIVITAMIN-MINERALS) tablet Take 1 tablet by mouth daily    Historical Provider, MD   albuterol sulfate  (90 Base) MCG/ACT inhaler Inhale 2 puffs into the lungs every 6 hours as needed for Wheezing  Patient not taking: Reported on 10/29/2021 12/25/20   Jen Eaton MD   aspirin EC 81 MG EC tablet Take 1 tablet by mouth daily 12/19/20   German Kwan MD   acetaminophen (TYLENOL) 500 MG tablet Take 500 mg by mouth every 6 hours as needed for Pain or Fever    Historical Provider, MD   estradiol (ESTRACE) 1 MG tablet Take 1 mg by mouth daily    Historical Provider, MD   fexofenadine (ALLEGRA ALLERGY) 180 MG tablet Take 180 mg by mouth daily Historical Provider, MD       Future Appointments   Date Time Provider Beth Flavia   2/2/2022  3:00 PM Rosa Vides MD AND KARLIE JONES

## 2022-01-05 ENCOUNTER — TELEPHONE (OUTPATIENT)
Dept: VASCULAR SURGERY | Age: 57
End: 2022-01-05

## 2022-01-05 DIAGNOSIS — R60.9 EDEMA, UNSPECIFIED TYPE: Primary | ICD-10-CM

## 2022-01-05 NOTE — TELEPHONE ENCOUNTER
Called patient regarding vds date and time at [place].  Scheduled at 2:00pm/arrive at 1:30pm. [email or web address]

## 2022-01-07 ENCOUNTER — OFFICE VISIT (OUTPATIENT)
Dept: VASCULAR SURGERY | Age: 57
End: 2022-01-07
Payer: COMMERCIAL

## 2022-01-07 ENCOUNTER — HOSPITAL ENCOUNTER (OUTPATIENT)
Dept: VASCULAR LAB | Age: 57
Discharge: HOME | End: 2022-01-07
Payer: COMMERCIAL

## 2022-01-07 VITALS
HEIGHT: 66 IN | BODY MASS INDEX: 31.66 KG/M2 | WEIGHT: 197 LBS | DIASTOLIC BLOOD PRESSURE: 70 MMHG | SYSTOLIC BLOOD PRESSURE: 120 MMHG

## 2022-01-07 DIAGNOSIS — M79.89 LEG SWELLING: Primary | ICD-10-CM

## 2022-01-07 DIAGNOSIS — R60.9 EDEMA, UNSPECIFIED TYPE: ICD-10-CM

## 2022-01-07 PROCEDURE — 93971 EXTREMITY STUDY: CPT

## 2022-01-07 PROCEDURE — 99203 OFFICE O/P NEW LOW 30 MIN: CPT | Performed by: SURGERY

## 2022-01-07 NOTE — PROGRESS NOTES
Medications:   Prior to Admission medications    Medication Sig Start Date End Date Taking? Authorizing Provider   vitamin B-12 (CYANOCOBALAMIN) 1000 MCG tablet Take 1,000 mcg by mouth daily   Yes Historical Provider, MD   Cholecalciferol (VITAMIN D3) 50 MCG (2000 UT) CAPS Take by mouth   Yes Historical Provider, MD   calcium-vitamin D (OSCAL-500) 500-200 MG-UNIT per tablet Take 1 tablet by mouth daily   Yes Historical Provider, MD   magnesium gluconate (MAGONATE) 500 MG tablet Take 500 mg by mouth 2 times daily   Yes Historical Provider, MD   NONFORMULARY Indications: probiotic    Yes Historical Provider, MD   Multiple Vitamins-Minerals (THERAPEUTIC MULTIVITAMIN-MINERALS) tablet Take 1 tablet by mouth daily   Yes Historical Provider, MD   acetaminophen (TYLENOL) 500 MG tablet Take 500 mg by mouth every 6 hours as needed for Pain or Fever   Yes Historical Provider, MD   estradiol (ESTRACE) 1 MG tablet Take 1 mg by mouth daily   Yes Historical Provider, MD   fexofenadine (ALLEGRA ALLERGY) 180 MG tablet Take 180 mg by mouth daily   Yes Historical Provider, MD        Allergies:  Patient has no known allergies.       Social History:      Social History     Socioeconomic History    Marital status:      Spouse name: Not on file    Number of children: Not on file    Years of education: Not on file    Highest education level: Not on file   Occupational History    Not on file   Tobacco Use    Smoking status: Never Smoker    Smokeless tobacco: Never Used   Vaping Use    Vaping Use: Never used   Substance and Sexual Activity    Alcohol use: No    Drug use: No    Sexual activity: Yes     Partners: Male   Other Topics Concern    Not on file   Social History Narrative    Not on file     Social Determinants of Health     Financial Resource Strain:     Difficulty of Paying Living Expenses: Not on file   Food Insecurity:     Worried About Running Out of Food in the Last Year: Not on file    [address] in the Last Year: Not on file   Transportation Needs:     Lack of Transportation (Medical): Not on file    Lack of Transportation (Non-Medical): Not on file   Physical Activity:     Days of Exercise per Week: Not on file    Minutes of Exercise per Session: Not on file   Stress:     Feeling of Stress : Not on file   Social Connections:     Frequency of Communication with Friends and Family: Not on file    Frequency of Social Gatherings with Friends and Family: Not on file    Attends [employer] Services: Not on file    Active Member of [address] [place] [address] [employer] or Organizations: Not on file    Attends Club or Organization Meetings: Not on file    Marital Status: Not on file   Intimate Partner Violence:     Fear of Current or Ex-Partner: Not on file    Emotionally Abused: Not on file    Physically Abused: Not on file    Sexually Abused: Not on file   Housing Stability:     Unable to Pay for Housing in the Last Year: Not on file    Number of [place] in the Last Year: Not on file    Unstable Housing in the Last Year: Not on file       Family History:        Problem Relation Age of Onset    High Cholesterol Mother     Diabetes Father     Kidney Disease Father     High Cholesterol Father        Review of Systems:  A 14 point review of systems was completed. Pertinent positives identified in the HPI, all other review of systems negative. Physical Examination:    /70 (Site: Right Upper Arm)   Ht 5' 6\" (1.676 m)   Wt 197 lb (89.4 kg)   LMP 08/14/2018 (Exact Date)   BMI 31.80 kg/m²     Weight: 197 lb (89.4 kg)       General appearance: NAD  Eyes: PERRLA  Neck: no JVD, no lymphadenopathy. Respiratory: effort is unlabored, no crackles, wheezes or rubs. Cardiovascular: regular, no murmur. No carotid bruits. Pulses:    DP PT   RIGHT 2 2   LEFT 2 2   GI: abdomen soft, nondistended, no organomegaly. Musculoskeletal: strength and tone normal.  Extremities: warm and pink. Bilateral non pitting edema. Neuro/psychiatric: grossly intact. MEDICAL DECISION MAKING/TESTING        Venous Duplex:    Right   No evidence of deep vein or superficial vein thrombosis involving the right   lower extremity. Left   No evidence of deep vein or superficial vein thrombosis involving the left   lower extremity. Assessment:      Diagnosis Orders   1. Leg swelling       No evidence of venous insufficiency. Etiology unclear. Recommendations/Plan:    Leg elevation and daily consistent use of 20-30 mmHg knee high compression stockings.         Bev Isabel MD, FACS

## 2022-01-19 ENCOUNTER — TELEPHONE (OUTPATIENT)
Dept: PULMONOLOGY | Age: 57
End: 2022-01-19

## 2022-03-02 ENCOUNTER — HOSPITAL ENCOUNTER (OUTPATIENT)
Dept: WOMENS IMAGING | Age: 57
Discharge: HOME | End: 2022-03-02
Payer: COMMERCIAL

## 2022-03-02 DIAGNOSIS — Z12.31 ENCOUNTER FOR SCREENING MAMMOGRAM FOR BREAST CANCER: ICD-10-CM

## 2022-03-02 PROCEDURE — 77063 BREAST TOMOSYNTHESIS BI: CPT

## 2022-05-05 ENCOUNTER — OFFICE VISIT (OUTPATIENT)
Dept: PULMONOLOGY | Age: 57
End: 2022-05-05
Payer: COMMERCIAL

## 2022-05-05 ENCOUNTER — TELEPHONE (OUTPATIENT)
Dept: PULMONOLOGY | Age: 57
End: 2022-05-05

## 2022-05-05 VITALS
OXYGEN SATURATION: 97 % | WEIGHT: 196 LBS | DIASTOLIC BLOOD PRESSURE: 60 MMHG | BODY MASS INDEX: 31.5 KG/M2 | RESPIRATION RATE: 18 BRPM | HEIGHT: 66 IN | HEART RATE: 61 BPM | TEMPERATURE: 98.3 F | SYSTOLIC BLOOD PRESSURE: 109 MMHG

## 2022-05-05 DIAGNOSIS — G47.33 OSA (OBSTRUCTIVE SLEEP APNEA): ICD-10-CM

## 2022-05-05 DIAGNOSIS — R60.0 BILATERAL LEG EDEMA: ICD-10-CM

## 2022-05-05 DIAGNOSIS — E66.9 CLASS 1 OBESITY WITH BODY MASS INDEX (BMI) OF 31.0 TO 31.9 IN ADULT, UNSPECIFIED OBESITY TYPE, UNSPECIFIED WHETHER SERIOUS COMORBIDITY PRESENT: ICD-10-CM

## 2022-05-05 DIAGNOSIS — Z86.16 PERSONAL HISTORY OF COVID-19: Primary | ICD-10-CM

## 2022-05-05 DIAGNOSIS — K76.0 FATTY INFILTRATION OF LIVER: ICD-10-CM

## 2022-05-05 PROCEDURE — 99213 OFFICE O/P EST LOW 20 MIN: CPT | Performed by: INTERNAL MEDICINE

## 2022-05-05 RX ORDER — ESCITALOPRAM OXALATE 10 MG/1
TABLET ORAL
COMMUNITY
Start: 2022-03-15 | End: 2022-08-24

## 2022-05-05 RX ORDER — TRAZODONE HYDROCHLORIDE 50 MG/1
TABLET ORAL
COMMUNITY
Start: 2022-03-15

## 2022-05-05 ASSESSMENT — SLEEP AND FATIGUE QUESTIONNAIRES
HOW LIKELY ARE YOU TO NOD OFF OR FALL ASLEEP WHILE SITTING AND READING: 0
HOW LIKELY ARE YOU TO NOD OFF OR FALL ASLEEP WHILE SITTING INACTIVE IN A PUBLIC PLACE: 0
HOW LIKELY ARE YOU TO NOD OFF OR FALL ASLEEP WHILE SITTING AND TALKING TO SOMEONE: 0
HOW LIKELY ARE YOU TO NOD OFF OR FALL ASLEEP WHILE LYING DOWN TO REST IN THE AFTERNOON WHEN CIRCUMSTANCES PERMIT: 2
HOW LIKELY ARE YOU TO NOD OFF OR FALL ASLEEP WHEN YOU ARE A PASSENGER IN A CAR FOR AN HOUR WITHOUT A BREAK: 0
NECK CIRCUMFERENCE (INCHES): 14.75
HOW LIKELY ARE YOU TO NOD OFF OR FALL ASLEEP WHILE SITTING QUIETLY AFTER LUNCH WITHOUT ALCOHOL: 0
HOW LIKELY ARE YOU TO NOD OFF OR FALL ASLEEP WHILE WATCHING TV: 2
HOW LIKELY ARE YOU TO NOD OFF OR FALL ASLEEP IN A CAR, WHILE STOPPED FOR A FEW MINUTES IN TRAFFIC: 0
ESS TOTAL SCORE: 4

## 2022-05-05 NOTE — PATIENT INSTRUCTIONS
Remember to bring a list of pulmonary medications and any CPAP or BiPAP machines to your next appointment with the office. Please keep all of your future appointments scheduled by [address], [facility] office. Out of respect for other patients and providers, you may be asked to reschedule your appointment if you arrive later than your scheduled appointment time. Appointments cancelled less than 24hrs in advance will be considered a no show. Patients with three missed appointments within 1 year or four missed appointments within 2 years can be dismissed from the practice. Please be aware that our physicians are required to work in the Intensive Care Unit at [facility].  Your appointment may need to be rescheduled if they are designated to work during your appointment time. You may receive a survey regarding the care you received during your visit. Your input is valuable to us. We encourage you to complete and return your survey. We hope you will choose us in the future for your healthcare needs. Pt instructed of all future appointment dates & times, including radiology, labs, procedures & referrals. If procedures were scheduled preparation instructions provided. Instructions on future appointments with [facility] were given.

## 2022-05-05 NOTE — PROGRESS NOTES
MA Communication:   The following orders are received by verbal communication from Omega Boyd MD    Orders include:    1 year follow up: Patient to call to schedule

## 2022-05-05 NOTE — PROGRESS NOTES
Chief Complaint/Referring Provider:  Pulmonary follow up and to discuss the compliance    Patient is a 62-year-old female who has come to the office for a pulmonary follow-up, patient has been having increased leg edema which is concerning to her, patient has seen a liver doctor or urologist and her PCP and patient has undergone various tests including imaging of the liver along with echocardiogram without any significant improvement in the leg swelling per se, patient has history of total abdominal hysterectomy with bilateral salpingo-oophorectomy for endometriosis in the past but patient does not know whether any lymph nodes were removed at that time or not, patient does not have any significant cough expectoration shortness of breath or wheezing, patient had issues with nasal pillow initially with a CPAP but patient was given a full facemask with improvement, patient does not have any increasing sleep fragmentation now, patient does not have any chest pain or palpitation, patient has some bloating of the abdomen along with that patient has some constipation, patient does not have any small joint pains, patient's thyroid function in the past was normal, patient does not have any other pertinent review of system of concern         Previous HPI: Patient is a 62-year-old female who has been referred to the office for pulmonary evaluation for sleep apnea    Patient been evaluated states that she has not been sleeping well lately, patient has been having more fragmentation with sleep, patient also feels tired and sleepy in the daytime, patient has less energy as compared to the previous times, patient also states that in the last 2 years time she has gained about 20 pounds, patient on questioning further states that she does have chronic allergies and patient takes Allegra on a daily basis, patient he was using a nasal spray before which was helping but patient states that she has been developing cataract and was told that it may be contributing to patient's cataract formation and was told not to take that nasal spray, patient also says that she has had surgeries like hysterectomy and gallbladder surgery in the past and has been told every time when she has had surgery that she has been a difficult intubation, patient also had COVID-19 pneumonia in December of last year and patient was on home oxygen after that but patient is not hypoxic now and does not use any supplemental oxygen, patient does not have any chest pain, no wheezing, patient also has started feeling that she has some reflux symptoms at times, patient has history of chronic constipation, patient's dietary habits are good, patient also has noticed that she has been having some swelling of the lower extremities, patient also had issues with her right foot and initially she was told that it may be plantar fasciitis but patient had seen a podiatrist and there may be much more but no diagnosis has been made but patient has been in the brace, patient also says that she underwent an ultrasound of the upper quadrant and was told that she has fatty infiltration of liver along with that there was a lesion on ultrasound for which she had a CT of the abdomen which was negative for any hepatic lesion,, patient does not have any change in them environment, patient is a non-smoker, patient does not drink any alcohol, 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     Endometriosis     Fatty infiltration of liver 10/29/2021    Fibroid tumor     Nausea & vomiting     Nephrolithiasis 10/29/2021    PONV (postoperative nausea and vomiting)     TMJ disease        Past Surgical History: Procedure Laterality Date     SECTION      CHOLECYSTECTOMY  2013    laparoscopic    COLONOSCOPY  2021    COLONOSCOPY POLYPECTOMY SNARE/COLD BIOPSY performed by Dontrell Gastelum MD at [facility]  2021    COLONOSCOPY POLYPECTOMY ABLATION performed by Dontrell Gastelum MD at [facility]      OTHER SURGICAL HISTORY  2018    SUCTION DILATATION AND CURETTAGE HYSTEROSCOPY,    STUART AND BSO  2018     Robotic assisted laparoscopic hysterectomy, Bilateral salpingoophorectomy, Minilaparotomy for uterine extraction         No Known Allergies    Medication list was reviewed and updated as needed in Epic    Social History     Socioeconomic History    Marital status:      Spouse name: Not on file    Number of children: Not on file    Years of education: Not on file    Highest education level: Not on file   Occupational History    Not on file   Tobacco Use    Smoking status: Never Smoker    Smokeless tobacco: Never Used   Vaping Use    Vaping Use: Never used   Substance and Sexual Activity    Alcohol use: No    Drug use: No    Sexual activity: Yes     Partners: Male   Other Topics Concern    Not on file   Social History Narrative    Not on file     Social Determinants of Health     Financial Resource Strain:     Difficulty of Paying Living Expenses: Not on file   Food Insecurity:     Worried About [address] in the Last Year: Not on file    David of Food in the Last Year: Not on file   Transportation Needs:     Lack of Transportation (Medical): Not on file    Lack of Transportation (Non-Medical):  Not on file   Physical Activity:     Days of Exercise per Week: Not on file    Minutes of Exercise per Session: Not on file   Stress:     Feeling of Stress : Not on file   Social Connections:     Frequency of Communication with Friends and Family: Not on file    Frequency of Social Gatherings with Friends and Family: Not on file    Attends Rastafarian Services: Not on file    Active Member of Clubs or Organizations: Not on file    Attends Club or Organization Meetings: Not on file    Marital Status: Not on file   Intimate Partner Violence:     Fear of Current or Ex-Partner: Not on file    Emotionally Abused: Not on file    Physically Abused: Not on file    Sexually Abused: Not on file   Housing Stability:     Unable to Pay for Housing in the Last Year: Not on file    Number of [place] in the Last Year: Not on file    Unstable Housing in the Last Year: Not on file       Family History   Problem Relation Age of Onset    High Cholesterol Mother     Diabetes Father     Kidney Disease Father     High Cholesterol Father     Breast Cancer Maternal Aunt     Breast Cancer Paternal Cousin             Review of Systems same as above     Physical Exam:  Blood pressure 109/60, pulse 61, temperature 98.3 °F (36.8 °C), temperature source Temporal, resp. rate 18, height 5' 6\" (1.676 m), weight 196 lb (88.9 kg), last menstrual period 08/14/2018, SpO2 97 %, not currently breastfeeding.'  Constitutional:  No acute distress. HENT:  Oropharynx is clear and moist. No thyromegaly. Eyes:  Conjunctivae arenormal. Pupils equal, round, and reactive to light. No scleral icterus. Neck: . No tracheal deviation present. No obvious thyroid mass. Cardiovascular:Normal rate, regular rhythm, normal heart sounds. No right ventricular heave. Some lower extremity edema. Pulmonary/Chest: No wheezes. No rales. Chest wall is not dull to percussion. Noaccessory muscle usage or stridor. Decreased breath sound density at the bases  Abdominal: Soft. Bowel sounds present. No distension or hernia. Notenderness. Musculoskeletal: No cyanosis. No clubbing. Patient wearing a brace right foot  Lymphadenopathy: No cervical or supraclavicular adenopathy. Skin: Skin is warm and dry.  No rash or nodules on the exposed extremities. Slight stasis changes in the distal hospital lower extremities  Psychiatric: Normal mood and affect. Behavior is normal.  No anxiety. Neurologic: Alert, awake and oriented. PERRL. Speech fluent      Sleep Medicine Data:  Sitting and reading: Would never doze  Watching TV: Moderate chance of dozing  Sitting, inactive in a public place (e.g. a theatre or a meeting): Would never doze  As a passenger in a car for an hour without a break: Would never doze  Lying down to rest in the afternoon when circumstances permit: Moderate chance of dozing  Sitting and talking to someone: Would never doze  Sitting quietly after a lunch without alcohol: Would never doze  In a car, while stopped for a few minutes in traffic: Would never doze  Total score: 4  Neck circumference (Inches): 14.75    Data:     Imaging:  I have reviewed radiology images personally. No orders to display     CT ABDOMEN W WO CONTRAST Additional Contrast? Oral    Result Date: 10/17/2021  EXAMINATION: CT ABDOMEN WITH AND WITHOUT CONTRAST 10/15/2021 3:13 pm TECHNIQUE: CT of the abdomen was performed without and with the administration of intravenous contrast. Multiplanar reformatted images are provided for review. Dose modulation, iterative reconstruction, and/or weight based adjustment of the mA/kV was utilized to reduce the radiation dose to as low as reasonably achievable. COMPARISON: Ultrasound from 09/23/2021 HISTORY: ORDERING SYSTEM PROVIDED HISTORY: Liver lesion, left lobe TECHNOLOGIST PROVIDED HISTORY: Additional Contrast?->Oral Reason for Exam: Left liver lesion seen on previous ultrasound, poss hemanigoma, reccomended dedicated hepatic protocol CT FINDINGS: Lower Chest: The lung bases are clear. Cardiac chambers are not enlarged and no pericardial effusion. Organs: Unenhanced liver appears unremarkable with previous cholecystectomy.  Early, portal venous, and delayed images of the liver have good parenchymal enhancement. There is a small area of fatty metamorphosis along the falciform ligament. The posteroinferior left hepatic lobe is a nonspecific 6 mm hypodensity series 5, image 56. This is seen on the portal venous phase but blends away on the other phases. Portal and hepatic veins are patent. The spleen, pancreas, and adrenal glands appear acceptable. 3 mm calculus in the inter polar at upper pole the right kidney and at the left kidney as well. There is no hydronephrosis. Fluid does distend the proximal to mid left ureter but the pelvis is not included in the exam. GI/Bowel: Oral contrast is present in the stomach and proximal small bowel loops. The bowel loops are not dilated for the included portion. Distal most loops in the pelvis are not included. There is no ascites or pneumoperitoneum in the abdomen. Peritoneum/Retroperitoneum: Good enhancement of the abdominal aorta with normal caliber. The major branches are patent. No bulky retroperitoneal lymphadenopathy. Bones/Soft Tissues: No lytic or blastic lesions are seen at the included bones. There are chronic pars defects L5 with grade 1 anterior spondylolisthesis. 1.  A 6 mm hypodensity at the posteroinferior left hepatic lobe on the portal venous phase is noted. Features are nonspecific. There is a small amount of fatty metamorphosis along the falciform ligament but otherwise the remaining liver has normal enhancement pattern. 2.  Nonobstructing calculi within the kidneys. Distension of the left ureter but the distal ureter and bladder are not included as the pelvis was not imaged.      ABDOMINAL ULTRASOUND 9/23/2021 11:27 am       COMPARISON:   Limited abdominal ultrasound 03/22/2013       HISTORY:   ORDERING SYSTEM PROVIDED HISTORY: Stomach ache       FINDINGS:   LIVER: There is a focal rounded echogenic lesion within the left hepatic lobe   measuring 0.9 cm in maximum transverse diameter.  No other definite focal   abnormality of the liver is identified.  No evidence of intrahepatic biliary   ductal dilatation.       BILIARY SYSTEM: Status post cholecystectomy.  Negative ultrasonographic   Menezes's sign.  Common duct measures 0.63 cm in AP diameter in the region of   the oneil hepatis which is upper limits of normal.       KIDNEYS: The kidneys are unremarkable in appearance without evidence of   hydronephrosis. Right kidney measures 10.6 cm in length.  Left kidney   measures 10.5 cm in length.       PANCREAS: Visualized portions of the pancreas are unremarkable.       SPLEEN: The spleen is unremarkable in appearance.  Spleen is within normal   limits in size.  Spleen measures 11.1 cm in length.       IVC: The IVC is patent.       AORTA: Aorta is patent without aneurysm.       OTHER: No evidence of ascites.           Impression   1. Status post interval cholecystectomy when compared to the study of   03/22/2013. 2. Nonspecific 0.9 cm echogenic lesion within the left hepatic lobe. Cavernous hemangioma and focal area of fat infiltration are in the   differential diagnosis.  Follow-up CT examination of the abdomen with hepatic   protocol is recommended for a more complete evaluation.         TSH 3.52        Patient sleep really has shown patient to have moderate RADHA with AHI of 26.4/h along with that patient had some nocturnal hypoxemia with saturation dropping to as low as 80%    ECHO -Essentially normal    Patient's compliance data from March shows that patient use the CPAP on 25 days out of 30 and patient's CPAP usage more than 4 hours was 70%, patient does not have any large leaks and patient's AHI has come down to 2.9/h with no Cheyne-Lam respiration    Assessment:    1. Obstructive  sleep apnea        2. Personal history of COVID-19      3. Class 1 obesity with body mass index (BMI) of 31.0 to 31.9 in adult, unspecified obesity type, unspecified whether serious comorbidity present      4. Fatty infiltration of liver      5. Nephrolithiasis      6.  B/L leg edema           Plan:   · Patient's review of system were discussed  · Patient was told about the clinical findings including auscultation and implications  · Patient's sleep test results and the CPAP compliance data was reviewed and patient has been using her CPAP in a compliant fashion but uses more than that will be helpful along with that patient has efficacy in terms of sleep apnea with the current settings of CPAP and no changes are need to be made  · Patient has chronic rhinitis for which she takes an antihistamine in the form of Allegra which can cause patient to have dryness of the eyes nose upper respiratory tract but patient states that if she does not take that and she can have increasing drooping of the nose which is uncomfortable when she is working  · Patient has been having some issues with leg edema and has had work-up done which was negative so far  · Patient was told to talk to her gynecologist to see if there were any lymph nodes was removed with hysterectomy which can also promote patient to have lymphedema  · Patient was given saline nasal rinse from the office and was recorded twice a day  · Steam inhalation will help  · A humidifier in the bedroom will help  · Patient was told about the pathophysiology and sequelae of RADHA  · Diet and lifestyle modifications including salt restrictions discussed  · Limb elevation and limb exercises will help  · Losing weight will help  · A regular regimented exercise will help  · Patient's thyroid function tests as been normal   · Patient to follow-up in 1 year time or earlier if required for sleep apnea evaluations

## 2022-05-05 NOTE — TELEPHONE ENCOUNTER
MA Communication: The following orders are received by verbal communication from Yandy Ornelas MD    Orders include: Follow up in 1 year: Pateint to call to schedule. Please watch for appt.

## 2022-07-25 LAB
CHOLESTEROL, TOTAL: 187 MG/DL (ref 0–199)
GLUCOSE BLD-MCNC: 87 MG/DL (ref 70–99)
HDLC SERPL-MCNC: 39 MG/DL (ref 40–60)
LDL CHOLESTEROL CALCULATED: 128 MG/DL
TRIGL SERPL-MCNC: 102 MG/DL (ref 0–150)

## 2022-07-28 ENCOUNTER — HOSPITAL ENCOUNTER (OUTPATIENT)
Age: 57
Discharge: HOME | End: 2022-07-28
Payer: COMMERCIAL

## 2022-07-28 LAB
A/G RATIO: 2.4 (ref 1.1–2.2)
ALBUMIN SERPL-MCNC: 4.7 G/DL (ref 3.4–5)
ALP BLD-CCNC: 87 U/L (ref 40–129)
ALT SERPL-CCNC: 14 U/L (ref 10–40)
ANION GAP SERPL CALCULATED.3IONS-SCNC: 16 MMOL/L (ref 3–16)
AST SERPL-CCNC: 12 U/L (ref 15–37)
BILIRUB SERPL-MCNC: 0.3 MG/DL (ref 0–1)
BUN BLDV-MCNC: 15 MG/DL (ref 7–20)
CALCIUM SERPL-MCNC: 10.3 MG/DL (ref 8.3–10.6)
CHLORIDE BLD-SCNC: 105 MMOL/L (ref 99–110)
CO2: 22 MMOL/L (ref 21–32)
CREAT SERPL-MCNC: 0.9 MG/DL (ref 0.6–1.1)
GFR AFRICAN AMERICAN: >60
GFR NON-AFRICAN AMERICAN: >60
GLUCOSE BLD-MCNC: 104 MG/DL (ref 70–99)
POTASSIUM SERPL-SCNC: 4.4 MMOL/L (ref 3.5–5.1)
SODIUM BLD-SCNC: 143 MMOL/L (ref 136–145)
T4 FREE: 1 NG/DL (ref 0.9–1.8)
TOTAL PROTEIN: 6.7 G/DL (ref 6.4–8.2)
TSH SERPL DL<=0.05 MIU/L-ACNC: 3.76 UIU/ML (ref 0.27–4.2)

## 2022-07-28 PROCEDURE — 84439 ASSAY OF FREE THYROXINE: CPT

## 2022-07-28 PROCEDURE — 84443 ASSAY THYROID STIM HORMONE: CPT

## 2022-07-28 PROCEDURE — 80053 COMPREHEN METABOLIC PANEL: CPT

## 2022-08-02 ENCOUNTER — HOSPITAL ENCOUNTER (OUTPATIENT)
Dept: ULTRASOUND IMAGING | Age: 57
Discharge: HOME | End: 2022-08-02
Payer: COMMERCIAL

## 2022-08-02 DIAGNOSIS — E04.9 ENLARGED THYROID GLAND: ICD-10-CM

## 2022-08-02 PROCEDURE — 76536 US EXAM OF HEAD AND NECK: CPT

## 2022-08-18 ENCOUNTER — TELEPHONE (OUTPATIENT)
Dept: ENDOCRINOLOGY | Age: 57
End: 2022-08-18

## 2022-08-18 NOTE — TELEPHONE ENCOUNTER
Fax from [facility] sending referral to see Dr. Tabitha Huang     Dx- Abnormal thyroid ultrasound     Referring Ann Marie Dick

## 2022-08-24 ENCOUNTER — OFFICE VISIT (OUTPATIENT)
Dept: FAMILY MEDICINE CLINIC | Age: 57
End: 2022-08-24
Payer: COMMERCIAL

## 2022-08-24 VITALS
DIASTOLIC BLOOD PRESSURE: 60 MMHG | HEIGHT: 66 IN | WEIGHT: 190 LBS | BODY MASS INDEX: 30.53 KG/M2 | SYSTOLIC BLOOD PRESSURE: 92 MMHG

## 2022-08-24 DIAGNOSIS — R53.83 FATIGUE, UNSPECIFIED TYPE: ICD-10-CM

## 2022-08-24 DIAGNOSIS — R79.89 ABNORMAL LFTS: ICD-10-CM

## 2022-08-24 DIAGNOSIS — G47.33 OSA (OBSTRUCTIVE SLEEP APNEA): ICD-10-CM

## 2022-08-24 DIAGNOSIS — K76.89 HEPATIC CYST: ICD-10-CM

## 2022-08-24 DIAGNOSIS — R93.89 ABNORMAL THYROID ULTRASOUND: Primary | ICD-10-CM

## 2022-08-24 LAB
FOLATE: 11.49 NG/ML (ref 4.78–24.2)
T3 TOTAL: 1.18 NG/ML (ref 0.8–2)
VITAMIN B-12: 669 PG/ML (ref 211–911)

## 2022-08-24 PROCEDURE — 99204 OFFICE O/P NEW MOD 45 MIN: CPT

## 2022-08-24 RX ORDER — ESCITALOPRAM OXALATE 10 MG/1
10 TABLET ORAL DAILY
COMMUNITY
End: 2022-08-24

## 2022-08-24 RX ORDER — TERBINAFINE HYDROCHLORIDE 250 MG/1
TABLET ORAL
COMMUNITY
Start: 2022-08-03 | End: 2022-08-24

## 2022-08-24 RX ORDER — TERBINAFINE HYDROCHLORIDE 250 MG/1
250 TABLET ORAL DAILY
COMMUNITY

## 2022-08-24 ASSESSMENT — ENCOUNTER SYMPTOMS
COLOR CHANGE: 0
DIARRHEA: 0
EYE PAIN: 0
ABDOMINAL PAIN: 0
SORE THROAT: 0
ABDOMINAL DISTENTION: 0
CHEST TIGHTNESS: 0
CONSTIPATION: 0
SHORTNESS OF BREATH: 0
EYE DISCHARGE: 0
COUGH: 0

## 2022-08-24 ASSESSMENT — PATIENT HEALTH QUESTIONNAIRE - PHQ9
1. LITTLE INTEREST OR PLEASURE IN DOING THINGS: 0
5. POOR APPETITE OR OVEREATING: 0
3. TROUBLE FALLING OR STAYING ASLEEP: 0
10. IF YOU CHECKED OFF ANY PROBLEMS, HOW DIFFICULT HAVE THESE PROBLEMS MADE IT FOR YOU TO DO YOUR WORK, TAKE CARE OF THINGS AT HOME, OR GET ALONG WITH OTHER PEOPLE: 0
SUM OF ALL RESPONSES TO PHQ9 QUESTIONS 1 & 2: 0
SUM OF ALL RESPONSES TO PHQ QUESTIONS 1-9: 2
4. FEELING TIRED OR HAVING LITTLE ENERGY: 1
8. MOVING OR SPEAKING SO SLOWLY THAT OTHER PEOPLE COULD HAVE NOTICED. OR THE OPPOSITE, BEING SO FIGETY OR RESTLESS THAT YOU HAVE BEEN MOVING AROUND A LOT MORE THAN USUAL: 0
7. TROUBLE CONCENTRATING ON THINGS, SUCH AS READING THE NEWSPAPER OR WATCHING TELEVISION: 1
6. FEELING BAD ABOUT YOURSELF - OR THAT YOU ARE A FAILURE OR HAVE LET YOURSELF OR YOUR FAMILY DOWN: 0
2. FEELING DOWN, DEPRESSED OR HOPELESS: 0
SUM OF ALL RESPONSES TO PHQ QUESTIONS 1-9: 2
9. THOUGHTS THAT YOU WOULD BE BETTER OFF DEAD, OR OF HURTING YOURSELF: 0
SUM OF ALL RESPONSES TO PHQ QUESTIONS 1-9: 2
SUM OF ALL RESPONSES TO PHQ QUESTIONS 1-9: 2

## 2022-08-24 NOTE — PROGRESS NOTES
Blood drawn per order. Needle size: 23 g  Site: L Antecubital.  First attempt successful yes    Second attempt na    Pressure applied until bleeding stopped. Cotton and bandage applied. Patient informed to call office or return if bleeding reoccurs and unable to stop.     Tubes drawn: 1 purple     3 red

## 2022-08-24 NOTE — PROGRESS NOTES
[facility] Medicine  Establish care visit   2022    Adriana Parham (:  1965) is a 56 y.o. female, here to establish care. Chief Complaint   Patient presents with    Establish Care     Former pcp was Angus Goss in [place]    Thyroid Problem     Pt is questioning if she has thyroid issues         ASSESSMENT/ PLAN  1. Abnormal thyroid ultrasound  -Assess thyroglobulin and thyroid peroxidase antibody and T3  -Based upon the ultrasound there is a concern for Hashimoto's versus Graves'.  -Labs to evaluate for further autoimmune.  -Continue to follow-up with endocrinology on     - THYROGLOBULIN  - Thyroid Peroxidase Antibody  - T3    2. Fatigue, unspecified type  -Fatigue I suspect could be related to Lexapro at this time. We will wean off Lexapro. She was instructed to take Lexapro every other day for the first week and then every 2 days for the second week and then stop  -We will assess for any improvement in fatigue symptoms.  -She will follow back up in 3 weeks  -Also could be related to underlying thyroid problems. - Vitamin B12 & Folate    3. RADHA (obstructive sleep apnea)  -Compliant with CPAP  -Follows with sleep medicine at [facility]    4. Hepatic cyst  -Hepatic cyst noted evaluated by MRI  -Stable, see below    5. Abnormal LFTs  -Abnormal LFTs in 2020. Likely secondary to COVID infection  -Liver enzymes are now normal.      Preventative Care:  MAMMO UTD  Follows with gyn annually   Labs UTD    Imaging:    US THYROID 2022    FINDINGS:   Right thyroid lobe:  4.9 x 1.7 x 1.5 cm       Left thyroid lobe:  5.3 x 1.7 x 1.6 cm       Isthmus:  0.6 cm       Thyroid Gland:  Thyroid gland demonstrates diffuse heterogenous echotexture   of the thyroid gland. Nodules: No discrete sonographically measurable focal thyroid nodule seen. Cervical lymphadenopathy: No abnormal lymph nodes in the imaged portions of   the neck.            Impression Nonspecific heterogenous echotexture of the thyroid gland, which may reflect   underlying conditions diffusely affecting thyroid gland such as Graves   disease or Hashimoto's thyroiditis. Correlation with thyroid function tests   is recommended. MAMMO 3/2/2022    Impression   No mammographic evidence of malignancy. BIRADS:   BIRADS - CATEGORY 2       Benign Findings. Normal interval follow-up is recommended in 12 months. OVERALL ASSESSMENT - BENIGN       A letter of notification will be sent to the patient regarding the results. The [employer] recommends annual mammograms for women 40   years and older. MRI ABDOMEN 11/3/2021    Organs: Previous identified 9 mm segment 3 lesion is seen to represent a   cyst.  Gallbladder is surgically absent. No biliary ductal dilatation. Pancreas is unremarkable. Adrenals are unremarkable. Spleen is normal in   size. Kidneys are unremarkable. Vasculature is unremarkable. GI/Bowel: Bowel is non-dilated without wall thickening. Peritoneum/Retroperitoneum:No free fluid, free air, organized fluid   collection or lymphadenopathy. Bones: Multilevel degenerative disc disease. Impression   Previously identified 9 mm segment 3 hepatic lesion is seen to represent a   cyst.       ECHO 12/21/2021    Summary   Normal left ventricular systolic function with an estimated ejection   fraction of 55-60%. No regional wall motion abnormalities are seen. Normal left ventricular diastolic filling pressure. Mild tricuspid regurgitation. Normal systolic pulmonary artery pressure (SPAP) estimated at 30 mmHg (RA   pressure 8 mmHg). No significant valvular heart disease. Right ventricular size and systolic function are normal.      Signature         Return in about 3 weeks (around 9/14/2022) for lexapro weaning f/u, thyroid. HPI  Patient is here to establish care.     Today she comes in with chief complaint of fatigue that has been worsening over the past 6 weeks. 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 She did follow-up with her primary care provider at that they did order a ultrasound thyroid that did show some changes and echogenicity that could be consistent with Graves' or Hashimoto's. She has noticed a widening gap between her TSH and her T4. She has never been evaluated for thyroid problems in the past.  However, since her fatigue has been worsening she is concerned that she has a thyroid problem. She also reports that she was placed on trazodone and Lexapro at the same time about 5 months ago by her prior PCP. She was placed on medications due to sleep apnea and anxiety secondary to her son being overseas in the [employer]. She states that trazodone has helped compliance with her CPAP. She is now sleeping with her CPAP regularly. She also has a history of a liver cyst that was evaluated by MRI. See above    Previously had ultrasounds of bilateral lower extremities for edema in bilateral lower extremities. Negative for DVT. She also did have echocardiogram and December 2021 which was normal.    Total Hysterectomy in 2018. On estrace 0.5 mg.     RADHA, follows with Dr. Kings Castro at [facility]. Wearing CPAP     Has appointment with endocrinology on 9/19. ROS  Review of Systems   Constitutional:  Positive for fatigue. Negative for chills, fever and unexpected weight change. HENT:  Negative for congestion, dental problem and sore throat. Eyes:  Negative for pain and discharge. Respiratory:  Negative for cough, chest tightness and shortness of breath. Cardiovascular:  Negative for chest pain, palpitations and leg swelling. Gastrointestinal:  Negative for abdominal distention, abdominal pain, constipation and diarrhea.    Endocrine: Negative for polydipsia, polyphagia and polyuria. Genitourinary:  Negative for dysuria, flank pain, hematuria and urgency. Musculoskeletal:  Negative for arthralgias, joint swelling and myalgias. Skin:  Negative for color change and rash. Neurological:  Negative for dizziness, light-headedness, numbness and headaches. Psychiatric/Behavioral:  Negative for agitation and behavioral problems. The patient is not nervous/anxious. HISTORIES  Current Outpatient Medications on File Prior to Visit   Medication Sig Dispense Refill    escitalopram (LEXAPRO) 10 MG tablet Take 10 mg by mouth daily      terbinafine (LAMISIL) 250 MG tablet Take 250 mg by mouth daily      traZODone (DESYREL) 50 MG tablet       Cholecalciferol (VITAMIN D3) 50 MCG (2000 UT) CAPS Take by mouth      magnesium gluconate (MAGONATE) 500 MG tablet Take 500 mg by mouth 2 times daily      Multiple Vitamins-Minerals (THERAPEUTIC MULTIVITAMIN-MINERALS) tablet Take 1 tablet by mouth daily      estradiol (ESTRACE) 1 MG tablet Take 0.5 mg by mouth daily      fexofenadine (ALLEGRA) 180 MG tablet Take 180 mg by mouth daily       No current facility-administered medications on file prior to visit. No Known Allergies    Past Medical History:   Diagnosis Date    Class 1 obesity in adult 10/29/2021    COVID-19 12/2020    Difficult intubation     states she's been told twice this.     Endometriosis     Fatty infiltration of liver 10/29/2021    Fibroid tumor     Nausea & vomiting     Nephrolithiasis 10/29/2021    PONV (postoperative nausea and vomiting)     TMJ disease        Patient Active Problem List   Diagnosis    DUB (dysfunctional uterine bleeding)    Fibroid uterus    Post-op pain    Pneumonia due to COVID-19 virus    Abnormal LFTs    Urinary tract infection without hematuria    RADHA (obstructive sleep apnea)    Personal history of COVID-19    Class 1 obesity in adult    Fatty infiltration of liver    Nephrolithiasis    Chronic rhinitis Bilateral leg edema    Hepatic cyst       Past Surgical History:   Procedure Laterality Date     SECTION      CHOLECYSTECTOMY  2013    laparoscopic    COLONOSCOPY  2021    COLONOSCOPY POLYPECTOMY SNARE/COLD BIOPSY performed by Angelo Cronin MD at [facility]  2021    COLONOSCOPY POLYPECTOMY ABLATION performed by Angelo Cronin MD at [address]      OTHER SURGICAL HISTORY  2018    SUCTION DILATATION AND CURETTAGE HYSTEROSCOPY,    TOTAL ABDOMINAL HYSTERECTOMY W/ BILATERAL SALPINGOOPHORECTOMY  2018     Robotic assisted laparoscopic hysterectomy, Bilateral salpingoophorectomy, Minilaparotomy for uterine extraction         Social History     Socioeconomic History    Marital status:      Spouse name: Not on file    Number of children: Not on file    Years of education: Not on file    Highest education level: Not on file   Occupational History    Not on file   Tobacco Use    Smoking status: Never    Smokeless tobacco: Never   Vaping Use    Vaping Use: Never used   Substance and Sexual Activity    Alcohol use: No    Drug use: No    Sexual activity: Yes     Partners: Male   Other Topics Concern    Not on file   Social History Narrative    Not on file     Social Determinants of Health     Financial Resource Strain: Not on file   Food Insecurity: Not on file   Transportation Needs: Not on file   Physical Activity: Not on file   Stress: Not on file   Social Connections: Not on file   Intimate Partner Violence: Not on file   Housing Stability: Not on file        Family History   Problem Relation Age of Onset    High Cholesterol Mother     Diabetes Father     Kidney Disease Father     High Cholesterol Father     Breast Cancer Maternal Aunt     Breast Cancer Paternal Cousin        PE  Vitals:    22 0724   BP: 92/60   Site: Left Upper Arm   Position: Sitting   Cuff Size: Medium Adult Weight: 190 lb (86.2 kg)   Height: 5' 6\" (1.676 m)     Estimated body mass index is 30.67 kg/m² as calculated from the following:    Height as of this encounter: 5' 6\" (1.676 m). Weight as of this encounter: 190 lb (86.2 kg). Physical Exam  Constitutional:       General: She is not in acute distress. Appearance: Normal appearance. She is not ill-appearing. HENT:      Head: Normocephalic. Right Ear: Tympanic membrane and external ear normal.      Left Ear: Tympanic membrane and external ear normal.      Nose: No congestion or rhinorrhea. Mouth/Throat:      Mouth: Mucous membranes are moist.      Pharynx: Oropharynx is clear. No posterior oropharyngeal erythema. Eyes:      Extraocular Movements: Extraocular movements intact. Conjunctiva/sclera: Conjunctivae normal.      Pupils: Pupils are equal, round, and reactive to light. Cardiovascular:      Rate and Rhythm: Normal rate and regular rhythm. Pulses: Normal pulses. Heart sounds: Normal heart sounds. No murmur heard. Pulmonary:      Effort: Pulmonary effort is normal. No respiratory distress. Breath sounds: Normal breath sounds. No wheezing. Abdominal:      General: Abdomen is flat. Bowel sounds are normal.      Palpations: Abdomen is soft. Tenderness: There is no abdominal tenderness. Hernia: No hernia is present. Musculoskeletal:         General: No swelling. Normal range of motion. Cervical back: Normal range of motion and neck supple. No tenderness. Right lower leg: No edema. Left lower leg: No edema. Lymphadenopathy:      Cervical: No cervical adenopathy. Skin:     General: Skin is warm and dry. Capillary Refill: Capillary refill takes less than 2 seconds. Neurological:      General: No focal deficit present. Mental Status: She is alert and oriented to person, place, and time. Mental status is at baseline. Cranial Nerves: No cranial nerve deficit.    Psychiatric: Mood and Affect: Mood normal.         Behavior: Behavior normal.         Judgment: Judgment normal.       Immunization History   Administered Date(s) Administered    COVID-19, PFIZER PURPLE top, DILUTE for use, (age 15 y+), 30mcg/0.3mL 06/11/2021, 07/02/2021    Influenza Vaccine, unspecified formulation 10/01/2017    Influenza Virus Vaccine 10/11/2019, 11/11/2021       Health Maintenance   Topic Date Due    Depression Screen  Never done    Diabetes screen  Never done    DTaP/Tdap/Td vaccine (1 - Tdap) 11/23/2016    COVID-19 Vaccine (3 - Booster for Pfizer series) 12/02/2021    Hepatitis C screen  08/24/2023 (Originally 7/15/1983)    HIV screen  08/24/2023 (Originally 7/15/1982)    Flu vaccine (1) 09/01/2022    Breast cancer screen  03/02/2024    Lipids  07/25/2027    Colorectal Cancer Screen  07/30/2031    Shingles vaccine  Completed    Hepatitis A vaccine  Aged Out    Hepatitis B vaccine  Aged Out    Hib vaccine  Aged Out    Meningococcal (ACWY) vaccine  Aged Out    Pneumococcal 0-64 years Vaccine  Aged Out       PSH, PMH, SH and FH reviewed and noted. Recent and past labs, tests and consults also reviewed. Recent or new meds also reviewed. Aniceto Martinez, APRN - CNP    This dictation was generated by voice recognition computer software. Although all attempts are made to edit the dictation for accuracy, there may be errors in the transcription that are not intended.

## 2022-08-25 LAB — THYROID PEROXIDASE (TPO) ABS: 207 IU/ML

## 2022-08-26 NOTE — RESULT ENCOUNTER NOTE
T3 is normal    Vitamin B12 is normal    Thyroid peroxidase is elevated. This could indicate Graves' or Hashimoto's.   Continue to follow-up with endocrinology on 9/19

## 2022-09-01 ENCOUNTER — ANESTHESIA EVENT (OUTPATIENT)
Dept: ENDOSCOPY | Age: 57
End: 2022-09-01
Payer: COMMERCIAL

## 2022-09-01 LAB — THYROGLOBULIN BY LC-MS/MS, SERUM/PLASMA: 18.2 NG/ML (ref 1.3–31.8)

## 2022-09-02 ENCOUNTER — HOSPITAL ENCOUNTER (OUTPATIENT)
Age: 57
Setting detail: OUTPATIENT SURGERY
Discharge: HOME | End: 2022-09-02
Attending: INTERNAL MEDICINE | Admitting: INTERNAL MEDICINE
Payer: COMMERCIAL

## 2022-09-02 ENCOUNTER — ANESTHESIA (OUTPATIENT)
Dept: ENDOSCOPY | Age: 57
End: 2022-09-02
Payer: COMMERCIAL

## 2022-09-02 VITALS
SYSTOLIC BLOOD PRESSURE: 102 MMHG | BODY MASS INDEX: 30.53 KG/M2 | HEART RATE: 58 BPM | WEIGHT: 190 LBS | RESPIRATION RATE: 16 BRPM | TEMPERATURE: 97 F | HEIGHT: 66 IN | OXYGEN SATURATION: 100 % | DIASTOLIC BLOOD PRESSURE: 73 MMHG

## 2022-09-02 DIAGNOSIS — R14.0 BLOATING: ICD-10-CM

## 2022-09-02 PROCEDURE — 6360000002 HC RX W HCPCS: Performed by: NURSE ANESTHETIST, CERTIFIED REGISTERED

## 2022-09-02 PROCEDURE — 3700000001 HC ADD 15 MINUTES (ANESTHESIA): Performed by: INTERNAL MEDICINE

## 2022-09-02 PROCEDURE — 3609012400 HC EGD TRANSORAL BIOPSY SINGLE/MULTIPLE: Performed by: INTERNAL MEDICINE

## 2022-09-02 PROCEDURE — 2500000003 HC RX 250 WO HCPCS: Performed by: NURSE ANESTHETIST, CERTIFIED REGISTERED

## 2022-09-02 PROCEDURE — 7100000011 HC PHASE II RECOVERY - ADDTL 15 MIN: Performed by: INTERNAL MEDICINE

## 2022-09-02 PROCEDURE — 88305 TISSUE EXAM BY PATHOLOGIST: CPT

## 2022-09-02 PROCEDURE — 3700000000 HC ANESTHESIA ATTENDED CARE: Performed by: INTERNAL MEDICINE

## 2022-09-02 PROCEDURE — 2709999900 HC NON-CHARGEABLE SUPPLY: Performed by: INTERNAL MEDICINE

## 2022-09-02 PROCEDURE — 7100000010 HC PHASE II RECOVERY - FIRST 15 MIN: Performed by: INTERNAL MEDICINE

## 2022-09-02 PROCEDURE — 2580000003 HC RX 258: Performed by: ANESTHESIOLOGY

## 2022-09-02 RX ORDER — SODIUM CHLORIDE 0.9 % (FLUSH) 0.9 %
5-40 SYRINGE (ML) INJECTION PRN
Status: DISCONTINUED | OUTPATIENT
Start: 2022-09-02 | End: 2022-09-02 | Stop reason: HOSPADM

## 2022-09-02 RX ORDER — LIDOCAINE HYDROCHLORIDE 20 MG/ML
INJECTION, SOLUTION INFILTRATION; PERINEURAL PRN
Status: DISCONTINUED | OUTPATIENT
Start: 2022-09-02 | End: 2022-09-02 | Stop reason: SDUPTHER

## 2022-09-02 RX ORDER — SODIUM CHLORIDE 0.9 % (FLUSH) 0.9 %
5-40 SYRINGE (ML) INJECTION EVERY 12 HOURS SCHEDULED
Status: DISCONTINUED | OUTPATIENT
Start: 2022-09-02 | End: 2022-09-02 | Stop reason: HOSPADM

## 2022-09-02 RX ORDER — SODIUM CHLORIDE, SODIUM LACTATE, POTASSIUM CHLORIDE, CALCIUM CHLORIDE 600; 310; 30; 20 MG/100ML; MG/100ML; MG/100ML; MG/100ML
INJECTION, SOLUTION INTRAVENOUS CONTINUOUS
Status: DISCONTINUED | OUTPATIENT
Start: 2022-09-02 | End: 2022-09-02 | Stop reason: HOSPADM

## 2022-09-02 RX ORDER — SODIUM CHLORIDE 9 MG/ML
INJECTION, SOLUTION INTRAVENOUS PRN
Status: DISCONTINUED | OUTPATIENT
Start: 2022-09-02 | End: 2022-09-02 | Stop reason: HOSPADM

## 2022-09-02 RX ORDER — PROPOFOL 10 MG/ML
INJECTION, EMULSION INTRAVENOUS PRN
Status: DISCONTINUED | OUTPATIENT
Start: 2022-09-02 | End: 2022-09-02 | Stop reason: SDUPTHER

## 2022-09-02 RX ADMIN — PROPOFOL 30 MG: 10 INJECTION, EMULSION INTRAVENOUS at 07:47

## 2022-09-02 RX ADMIN — PROPOFOL 100 MG: 10 INJECTION, EMULSION INTRAVENOUS at 07:42

## 2022-09-02 RX ADMIN — SODIUM CHLORIDE, POTASSIUM CHLORIDE, SODIUM LACTATE AND CALCIUM CHLORIDE: 600; 310; 30; 20 INJECTION, SOLUTION INTRAVENOUS at 06:44

## 2022-09-02 RX ADMIN — PROPOFOL 20 MG: 10 INJECTION, EMULSION INTRAVENOUS at 07:51

## 2022-09-02 RX ADMIN — LIDOCAINE HYDROCHLORIDE 100 MG: 20 INJECTION, SOLUTION INFILTRATION; PERINEURAL at 07:40

## 2022-09-02 ASSESSMENT — PAIN SCALES - GENERAL
PAINLEVEL_OUTOF10: 0

## 2022-09-02 ASSESSMENT — PAIN - FUNCTIONAL ASSESSMENT: PAIN_FUNCTIONAL_ASSESSMENT: 0-10

## 2022-09-02 ASSESSMENT — LIFESTYLE VARIABLES: SMOKING_STATUS: 0

## 2022-09-02 NOTE — H&P
Gastroenterology Note                 Pre-operative History and Physical    Patient: Yared Cisneros  : 1965  CSN:     History Obtained From:   Patient or guardian. HISTORY OF PRESENT ILLNESS:    The patient is a 56 y.o. female here for EGD for early satiety, suspected reflux (dental erosions), bloating    Past Medical History:    Past Medical History:   Diagnosis Date    Class 1 obesity in adult 10/29/2021    COVID-19 2020    Difficult intubation     states she's been told twice this. Endometriosis     Fatty infiltration of liver 10/29/2021    Fibroid tumor     Nausea & vomiting     Nephrolithiasis 10/29/2021    PONV (postoperative nausea and vomiting)     Sleep apnea     wears CPAP    TMJ disease      Past Surgical History:    Past Surgical History:   Procedure Laterality Date     SECTION      CHOLECYSTECTOMY  2013    laparoscopic    COLONOSCOPY  2021    COLONOSCOPY POLYPECTOMY SNARE/COLD BIOPSY performed by Jaylen Gonsalez MD at [address]  2021    COLONOSCOPY POLYPECTOMY ABLATION performed by Jaylen Gonsalez MD at [address] (CERVIX STATUS UNKNOWN)      OTHER SURGICAL HISTORY  2018    SUCTION DILATATION AND CURETTAGE HYSTEROSCOPY,    STUART AND BSO (CERVIX REMOVED)  2018     Robotic assisted laparoscopic hysterectomy, Bilateral salpingoophorectomy, Minilaparotomy for uterine extraction       Medications Prior to Admission:   No current facility-administered medications on file prior to encounter.      Current Outpatient Medications on File Prior to Encounter   Medication Sig Dispense Refill    terbinafine (LAMISIL) 250 MG tablet Take 250 mg by mouth daily      traZODone (DESYREL) 50 MG tablet       Cholecalciferol (VITAMIN D3) 50 MCG ( UT) CAPS Take by mouth      magnesium gluconate (MAGONATE) 500 MG tablet Take 500 mg by mouth 2 times daily Multiple Vitamins-Minerals (THERAPEUTIC MULTIVITAMIN-MINERALS) tablet Take 1 tablet by mouth daily      estradiol (ESTRACE) 1 MG tablet Take 0.5 mg by mouth daily      fexofenadine (ALLEGRA) 180 MG tablet Take 180 mg by mouth daily          Allergies:  Patient has no known allergies. Social History:   Social History     Tobacco Use    Smoking status: Never    Smokeless tobacco: Never   Substance Use Topics    Alcohol use: No     Family History:   Family History   Problem Relation Age of Onset    High Cholesterol Mother     Diabetes Father     Kidney Disease Father     High Cholesterol Father     Breast Cancer Maternal Aunt     Breast Cancer Paternal Cousin        PHYSICAL EXAM:      /61   Pulse 55   Temp 97.6 °F (36.4 °C) (Temporal)   Resp 16   Ht 5' 6\" (1.676 m)   Wt 190 lb (86.2 kg)   LMP 08/14/2018 (Exact Date)   SpO2 100%   BMI 30.67 kg/m²  I        Heart:  RRR, normal s1s2    Lungs:  CTA and normal effort    Abdomen:   Soft, nt nd. ASSESSMENT AND PLAN:    1. Patient is a 56 y.o. female here for endoscopy with MAC sedation. 2.  Procedure options, risks and benefits reviewed with patient and/or guardian. They express understanding.      Faith Lowry MD  [address] [facility]

## 2022-09-02 NOTE — DISCHARGE INSTRUCTIONS
ENDOSCOPY DISCHARGE INSTRUCTIONS:    Call the physician that did your procedure for any questions or concern:    [facility]: [phone]  DR. ARNOLD OLIVARES      ACTIVITY:    There are potential side effects to the medications used for sedation and anesthesia during your procedure. These include:  Dizziness or light-headedness, confusion or memory loss, delayed reaction times, loss of coordination, nausea and vomiting. Because of your increased risk for injury, we ask that you observe the following precautions: For the next 24 hours,  DO NOT operate an automobile, bicycle, motorcycle, , power tools or large equipment of any kind. Do not drink alcohol, sign any legal documents or make any legal decisions for 24 hours. Do not bend your head over lower than your heart. DO sit on the side of bed/couch awhile before getting up. Plan on bedrest or quiet relaxation today. You may resume normal activities in 24 hours. DIET:    Your first meal today should be light, avoiding spicy and fatty foods. If you tolerate this first meal, then you may advance to your regular diet unless otherwise advised by your physician. NORMAL SYMPTOMS:  -Mild sore throat if youve had an EGD   -Gaseous discomfort    NOTIFY YOUR PHYSICIAN IF THESE SYMPTOMS OCCUR:  1. Fever (greater than 100)  5. Increased abdominal bloating  2. Severe pain    6. Excessive bleeding  3. Nausea and vomiting  7. Chest pain                                                                    4. Chills    8. Shortness of breath    ADDITIONAL INSTRUCTIONS:    Biopsy results: Call [address],[facility] for biopsy results in 1 week    Educational Information:          Please review these discharge instructions this evening or tomorrow for  information you may have forgotten. We want to thank you for choosing the [facility] as your health care provider. We always strive to provide you with excellent care while you are here.  You may receive a survey in the mail regarding your care. We would appreciate you taking a few minutes of your time to complete this survey. ENDOSCOPY DISCHARGE INSTRUCTIONS:    Call the physician that did your procedure for any questions or concern:    [facility]: [phone]  DR. ARNOLD OLIVARES      ACTIVITY:    There are potential side effects to the medications used for sedation and anesthesia during your procedure. These include:  Dizziness or light-headedness, confusion or memory loss, delayed reaction times, loss of coordination, nausea and vomiting. Because of your increased risk for injury, we ask that you observe the following precautions: For the next 24 hours,  DO NOT operate an automobile, bicycle, motorcycle, , power tools or large equipment of any kind. Do not drink alcohol, sign any legal documents or make any legal decisions for 24 hours. Do not bend your head over lower than your heart. DO sit on the side of bed/couch awhile before getting up. Plan on bedrest or quiet relaxation today. You may resume normal activities in 24 hours. DIET:    Your first meal today should be light, avoiding spicy and fatty foods. If you tolerate this first meal, then you may advance to your regular diet unless otherwise advised by your physician. NORMAL SYMPTOMS:  -Mild sore throat if youve had an EGD   -Gaseous discomfort    NOTIFY YOUR PHYSICIAN IF THESE SYMPTOMS OCCUR:  1. Fever (greater than 100)  5. Increased abdominal bloating  2. Severe pain    6. Excessive bleeding  3. Nausea and vomiting  7. Chest pain                                                                    4. Chills    8. Shortness of breath    ADDITIONAL INSTRUCTIONS:    Biopsy results: Call [address],[facility] for biopsy results in 1 week    Educational Information:          Please review these discharge instructions this evening or tomorrow for  information you may have forgotten. We want to thank you for choosing the [facility] as your health care provider. We always strive to provide you with excellent care while you are here. You may receive a survey in the mail regarding your care. We would appreciate you taking a few minutes of your time to complete this survey. Gastroesophageal Reflux Disease (GERD): Care Instructions  Overview     Gastroesophageal reflux disease (GERD) is the backward flow of stomach acid into the esophagus. The esophagus is the tube that leads from your throat to your stomach. A one-way valve prevents the stomach acid from backing up into this tube. But when you have GERD, this valve does not close tightly enough. This can also cause pain and swelling in your esophagus. (This is calledesophagitis.)  If you have mild GERD symptoms including heartburn, you may be able to control the problem with antacids or over-the-counter medicine. You can also make lifestyle changes to help reduce your symptoms. These include changing yourdiet and eating habits, such as not eating late at night and losing weight. Follow-up care is a key part of your treatment and safety. Be sure to make and go to all appointments, and call your doctor if you are having problems. It's also a good idea to know your test results and keep alist of the medicines you take. How can you care for yourself at home? Take your medicines exactly as prescribed. Call your doctor if you think you are having a problem with your medicine. Your doctor may recommend over-the-counter medicine. For mild or occasional indigestion, antacids, such as Tums, Gaviscon, Mylanta, or Maalox, may help. Your doctor also may recommend over-the-counter acid reducers, such as famotidine (Pepcid AC), cimetidine (Tagamet HB), or omeprazole (Prilosec). Read and follow all instructions on the label. If you use these medicines often, talk with your doctor.   Change your eating habits. It's best to eat several small meals instead of two or three large meals. After you eat, wait 2 to 3 hours before you lie down. Avoid foods that make your symptoms worse. These may include chocolate, mint, alcohol, pepper, spicy foods, high-fat foods, or drinks with caffeine in them, such as tea, coffee, venkatesh, or energy drinks. If your symptoms are worse after you eat a certain food, you may want to stop eating it to see if your symptoms get better. Do not smoke or chew tobacco. Smoking can make GERD worse. If you need help quitting, talk to your doctor about stop-smoking programs and medicines. These can increase your chances of quitting for good. If you have GERD symptoms at night, raise the head of your bed 6 to 8 inches by putting the frame on blocks or placing a foam wedge under the head of your mattress. (Adding extra pillows does not work.)  Do not wear tight clothing around your middle. Lose weight if you need to. Losing just 5 to 10 pounds can help. When should you call for help? Call your doctor now or seek immediate medical care if:    You have new or different belly pain. Your stools are black and tarlike or have streaks of blood. Watch closely for changes in your health, and be sure to contact your doctor if:    Your symptoms have not improved after 2 days. Food seems to catch in your throat or chest.   Where can you learn more? Go to https://[employer]. org and sign in to your [employer] account. Enter D426 in the [facility] box to learn more about \"Gastroesophageal Reflux Disease (GERD): Care Instructions. \"     If you do not have an account, please click on the \"Sign Up Now\" link. Current as of: September 8, 2021               Content Version: 13.3  © 2006-2022 [employer]. Care instructions adapted under license by [facility] ([facility]).  If you have questions about a medical condition or this instruction, always ask your healthcare professional. [address] any warranty or liability for your use of this information.

## 2022-09-02 NOTE — PROCEDURES
[address] [place] [address]  Endoscopy Note    Patient: Abril Han  : 1965  Acct#:     Procedure: Esophagogastroduodenoscopy with biopsy    Date:  2022     Surgeon:  Marybeth Montejo MD      Anesthesia:    IV propofol, per anesthesia       EBL: <50 mL    Indications: Abdominal pain, early satiety, suspected reflux, bloating    Description of Procedure:    Informed consent was obtained from the patient after explanation of indications, benefits and possible risks and complications of the procedure. The patient was then taken to the endoscopy suite, placed in the left lateral decubitus position and the above IV sedation was administrered. The Olympus videoendoscope (GIF-H190) was placed in the patient's mouth and under direct visualization passed into the esophagus. The scope was then advanced into the stomach and to the second portion of the duodenum. A retroflexed exam of the gastric cardia and fundus was performed. The scope was then withdrawn back into the stomach, it was decompressed, and the scope was completely withdrawn. Findings:  Normal first and second portion of the duodenum. Biopsies were obtained evaluate for celiac disease. Retained bilious fluid in the gastric body. Mild erythema in the gastric antrum and body. Biopsies were obtained evaluate for H. pylori. Fullness in the gastric cardia which appeared to be submucosal.  Squamous islands in the lower esophagus. Biopsies were obtained evaluate for Hyde's esophagus. Subtle linear erosions in the distal esophagus suggestive of acid reflux. The patient tolerated the procedure well and was taken to the post anesthesia care unit in good condition. Biopsies: Yes. Impression:    Normal first and second portion of the duodenum. Biopsies were obtained evaluate for celiac disease. Retained bilious fluid in the gastric body. Mild erythema in the gastric antrum and body.   Biopsies were obtained evaluate for H. pylori. Fullness in the gastric cardia which appeared to be submucosal.  Squamous islands in the lower esophagus. Biopsies were obtained evaluate for Hyde's esophagus. Subtle linear erosions in the distal esophagus suggestive of acid reflux. Recommendations:   Await pathology results. Pantoprazole 40 mg daily. Will discuss CT scan to evaluate the submucosal lesion seen in the gastric cardia.        Angelica Quiroz MD  [facility]

## 2022-09-02 NOTE — ANESTHESIA PRE PROCEDURE
Department of Anesthesiology  Preprocedure Note       Name:  Abril Han   Age:  56 y.o.  :  1965                                          MRN:  [id number]         Date:  2022      Surgeon: Jennie Barnard):  Daniel Daley MD    Procedure: Procedure(s):  ESOPHAGOGASTRODUODENOSCOPY    Medications prior to admission:   Prior to Admission medications    Medication Sig Start Date End Date Taking?  Authorizing Provider   terbinafine (LAMISIL) 250 MG tablet Take 250 mg by mouth daily    Historical Provider, MD   traZODone (DESYREL) 50 MG tablet  3/15/22   Historical Provider, MD   Cholecalciferol (VITAMIN D3) 50 MCG (2000) CAPS Take by mouth    Historical Provider, MD   magnesium gluconate (MAGONATE) 500 MG tablet Take 500 mg by mouth 2 times daily    Historical Provider, MD   Multiple Vitamins-Minerals (THERAPEUTIC MULTIVITAMIN-MINERALS) tablet Take 1 tablet by mouth daily    Historical Provider, MD   estradiol (ESTRACE) 1 MG tablet Take 0.5 mg by mouth daily    Historical Provider, MD   fexofenadine (ALLEGRA) 180 MG tablet Take 180 mg by mouth daily    Historical Provider, MD       Current medications:    Current Facility-Administered Medications   Medication Dose Route Frequency Provider Last Rate Last Admin    lactated ringers infusion   IntraVENous Continuous Gerhardt Roach,  mL/hr at 22 0644 New Bag at 22 0644    sodium chloride flush 0.9 % injection 5-40 mL  5-40 mL IntraVENous 2 times per day Gerhardt Roach, MD        sodium chloride flush 0.9 % injection 5-40 mL  5-40 mL IntraVENous PRN Gerhardt Roach, MD        0.9 % sodium chloride infusion   IntraVENous PRN Gerhardt Roach, MD           Allergies:  No Known Allergies    Problem List:    Patient Active Problem List   Diagnosis Code    DUB (dysfunctional uterine bleeding) N93.8    Fibroid uterus D25.9    Post-op pain G89.18    Pneumonia due to COVID-19 virus U07.1, J12.82    Abnormal LFTs R94.5    Urinary tract infection without hematuria N39.0    RADHA (obstructive sleep apnea) G47.33    Personal history of COVID-19 Z86.16    Class 1 obesity in adult E66.9    Fatty infiltration of liver K76.0    Nephrolithiasis N20.0    Chronic rhinitis J31.0    Bilateral leg edema R60.0    Hepatic cyst K76.89       Past Medical History:        Diagnosis Date    Class 1 obesity in adult 10/29/2021    COVID-19 2020    Difficult intubation     states she's been told twice this.  Endometriosis     Fatty infiltration of liver 10/29/2021    Fibroid tumor     Nausea & vomiting     Nephrolithiasis 10/29/2021    PONV (postoperative nausea and vomiting)     Sleep apnea     wears CPAP    TMJ disease        Past Surgical History:        Procedure Laterality Date     SECTION      CHOLECYSTECTOMY  2013    laparoscopic    COLONOSCOPY  2021    COLONOSCOPY POLYPECTOMY SNARE/COLD BIOPSY performed by Claudette Furrow, MD at [facility]  2021    COLONOSCOPY POLYPECTOMY ABLATION performed by Claudette Furrow, MD at [address] OF UTERUS      HYSTERECTOMY (CERVIX STATUS UNKNOWN)      OTHER SURGICAL HISTORY  2018    SUCTION DILATATION AND CURETTAGE HYSTEROSCOPY,    STUART AND BSO (CERVIX REMOVED)  2018     Robotic assisted laparoscopic hysterectomy, Bilateral salpingoophorectomy, Minilaparotomy for uterine extraction         Social History:    Social History     Tobacco Use    Smoking status: Never    Smokeless tobacco: Never   Substance Use Topics    Alcohol use:  No                                Counseling given: Not Answered      Vital Signs (Current):   Vitals:    22 0628   BP: 105/61   Pulse: 55   Resp: 16   Temp: 97.6 °F (36.4 °C)   TempSrc: Temporal   SpO2: 100%   Weight: 190 lb (86.2 kg)   Height: 5' 6\" (1.676 m)                                              BP Readings from Last 3 Encounters:   22 105/61 08/24/22 92/60   05/05/22 109/60       NPO Status: Time of last liquid consumption: 2000                        Time of last solid consumption: 1800                        Date of last liquid consumption: 09/01/22                        Date of last solid food consumption: 09/01/22    BMI:   Wt Readings from Last 3 Encounters:   09/02/22 190 lb (86.2 kg)   08/24/22 190 lb (86.2 kg)   05/05/22 196 lb (88.9 kg)     Body mass index is 30.67 kg/m². CBC:   Lab Results   Component Value Date/Time    WBC 6.5 09/17/2021 07:20 AM    RBC 4.47 09/17/2021 07:20 AM    HGB 13.5 09/17/2021 07:20 AM    HCT 41.1 09/17/2021 07:20 AM    MCV 91.9 09/17/2021 07:20 AM    RDW 13.0 09/17/2021 07:20 AM     09/17/2021 07:20 AM       CMP:   Lab Results   Component Value Date/Time     07/28/2022 07:11 AM    K 4.4 07/28/2022 07:11 AM    K 3.9 12/24/2020 05:42 AM     07/28/2022 07:11 AM    CO2 22 07/28/2022 07:11 AM    BUN 15 07/28/2022 07:11 AM    CREATININE 0.9 07/28/2022 07:11 AM    GFRAA >60 07/28/2022 07:11 AM    GFRAA >60 04/09/2013 11:45 AM    AGRATIO 2.4 07/28/2022 07:11 AM    LABGLOM >60 07/28/2022 07:11 AM    GLUCOSE 104 07/28/2022 07:11 AM    PROT 6.7 07/28/2022 07:11 AM    PROT 7.0 10/03/2012 07:42 PM    CALCIUM 10.3 07/28/2022 07:11 AM    BILITOT 0.3 07/28/2022 07:11 AM    ALKPHOS 87 07/28/2022 07:11 AM    AST 12 07/28/2022 07:11 AM    ALT 14 07/28/2022 07:11 AM       POC Tests: No results for input(s): POCGLU, POCNA, POCK, POCCL, POCBUN, POCHEMO, POCHCT in the last 72 hours.     Coags:   Lab Results   Component Value Date/Time    PROTIME 11.6 02/23/2018 02:30 PM    INR 1.03 02/23/2018 02:30 PM    APTT 22.4 02/23/2018 02:30 PM       HCG (If Applicable):   Lab Results   Component Value Date    PREGTESTUR Negative 03/05/2018        ABGs: No results found for: PHART, PO2ART, ZGN0UBL, CMY0VYM, BEART, U8PXJTLO     Type & Screen (If Applicable):  No results found for: LABABO, LABRH    Drug/Infectious Status (If Applicable):  No results found for: HIV, HEPCAB    COVID-19 Screening (If Applicable):   Lab Results   Component Value Date/Time    COVID19 POSITIVE 12/21/2020 12:00 AM           Anesthesia Evaluation     history of anesthetic complications: PONV. Airway: Mallampati: III  TM distance: >3 FB   Neck ROM: full  Mouth opening: > = 3 FB   Dental: normal exam         Pulmonary: breath sounds clear to auscultation  (+) sleep apnea:      (-) COPD, asthma and not a current smoker                           Cardiovascular:        (-) hypertension, past MI, CAD, CABG/stent, dysrhythmias,  angina,  CHF and orthopnea      Rhythm: regular  Rate: normal           Beta Blocker:  Not on Beta Blocker         Neuro/Psych:      (-) seizures, TIA and CVA           GI/Hepatic/Renal:   (+) liver disease (fatt liver):,      (-) hepatitis and no renal disease       Endo/Other:    (+) no malignancy/cancer. (-) diabetes mellitus, hypothyroidism, hyperthyroidism, no malignancy/cancer                ROS comment: High thyroid antibodies, in process of testing Abdominal:             Vascular:     - DVT and PE. Other Findings:           Anesthesia Plan      MAC     ASA 2       Induction: intravenous. Anesthetic plan and risks discussed with patient. Plan discussed with CRNA.                     Rob Be MD   9/2/2022

## 2022-09-02 NOTE — ANESTHESIA POSTPROCEDURE EVALUATION
Department of Anesthesiology  Postprocedure Note    Patient: Yared Cisneros  MRN: [id number]  YOB: 1965  Date of evaluation: 9/2/2022      Procedure Summary     Date: 09/02/22 Room / Location: [facility]    Anesthesia Start: 6441 Anesthesia Stop: 7103    Procedure: EGD BIOPSY Diagnosis:       Bloating      (Bloating [R14.0])    Surgeons: Jaylen Gonsalez MD Responsible Provider: Lizet Saucedo MD    Anesthesia Type: MAC ASA Status: 2          Anesthesia Type: No value filed.     Rosie Phase I: Rosie Score: 10    Rosie Phase II: Rosie Score: 10      Anesthesia Post Evaluation    Patient location during evaluation: bedside  Level of consciousness: awake and alert  Airway patency: patent  Nausea & Vomiting: no vomiting  Complications: no  Cardiovascular status: blood pressure returned to baseline  Respiratory status: acceptable  Hydration status: euvolemic  Multimodal analgesia pain management approach

## 2022-09-02 NOTE — RESULT ENCOUNTER NOTE
Thyroglobulin is normal.    Considering that the thyroid peroxidase is elevated I would still follow-up with endocrinology.   Still could be Hashimoto's or Graves'

## 2022-09-20 ENCOUNTER — HOSPITAL ENCOUNTER (OUTPATIENT)
Age: 57
Discharge: HOME | End: 2022-09-20
Payer: COMMERCIAL

## 2022-09-20 LAB
A/G RATIO: 2.2 (ref 1.1–2.2)
ALBUMIN SERPL-MCNC: 4.7 G/DL (ref 3.4–5)
ALP BLD-CCNC: 83 U/L (ref 40–129)
ALT SERPL-CCNC: 12 U/L (ref 10–40)
ANION GAP SERPL CALCULATED.3IONS-SCNC: 11 MMOL/L (ref 3–16)
ANTI-THYROGLOB ABS: 307 IU/ML
AST SERPL-CCNC: 12 U/L (ref 15–37)
BASOPHILS ABSOLUTE: 0 K/UL (ref 0–0.2)
BASOPHILS RELATIVE PERCENT: 0.7 %
BILIRUB SERPL-MCNC: 0.6 MG/DL (ref 0–1)
BUN BLDV-MCNC: 16 MG/DL (ref 7–20)
C-REACTIVE PROTEIN: <3 MG/L (ref 0–5.1)
CALCIUM SERPL-MCNC: 10.2 MG/DL (ref 8.3–10.6)
CHLORIDE BLD-SCNC: 105 MMOL/L (ref 99–110)
CO2: 26 MMOL/L (ref 21–32)
CORTISOL - AM: 10.4 UG/DL (ref 4.3–22.4)
CREAT SERPL-MCNC: 0.8 MG/DL (ref 0.6–1.1)
EOSINOPHILS ABSOLUTE: 0.2 K/UL (ref 0–0.6)
EOSINOPHILS RELATIVE PERCENT: 4 %
FERRITIN: 99.5 NG/ML (ref 15–150)
FOLATE: 13.92 NG/ML (ref 4.78–24.2)
FOLLICLE STIMULATING HORMONE: 56.1 MIU/ML
GFR AFRICAN AMERICAN: >60
GFR NON-AFRICAN AMERICAN: >60
GLUCOSE BLD-MCNC: 83 MG/DL (ref 70–99)
HCT VFR BLD CALC: 38.9 % (ref 36–48)
HEMOGLOBIN: 13 G/DL (ref 12–16)
IRON SATURATION: 32 % (ref 15–50)
IRON: 89 UG/DL (ref 37–145)
LUTEINIZING HORMONE: 36.4 MIU/ML
LYMPHOCYTES ABSOLUTE: 1.3 K/UL (ref 1–5.1)
LYMPHOCYTES RELATIVE PERCENT: 22.1 %
MAGNESIUM: 2.4 MG/DL (ref 1.8–2.4)
MCH RBC QN AUTO: 30.2 PG (ref 26–34)
MCHC RBC AUTO-ENTMCNC: 33.4 G/DL (ref 31–36)
MCV RBC AUTO: 90.5 FL (ref 80–100)
MONOCYTES ABSOLUTE: 0.4 K/UL (ref 0–1.3)
MONOCYTES RELATIVE PERCENT: 7.7 %
NEUTROPHILS ABSOLUTE: 3.8 K/UL (ref 1.7–7.7)
NEUTROPHILS RELATIVE PERCENT: 65.5 %
PDW BLD-RTO: 13.7 % (ref 12.4–15.4)
PLATELET # BLD: 190 K/UL (ref 135–450)
PMV BLD AUTO: 9.7 FL (ref 5–10.5)
POTASSIUM SERPL-SCNC: 4.2 MMOL/L (ref 3.5–5.1)
PROGESTERONE LEVEL: 0.17 NG/ML
RBC # BLD: 4.29 M/UL (ref 4–5.2)
SEDIMENTATION RATE, ERYTHROCYTE: 11 MM/HR (ref 0–30)
SODIUM BLD-SCNC: 142 MMOL/L (ref 136–145)
T3 FREE: 3.1 PG/ML (ref 2.3–4.2)
T4 FREE: 1.1 NG/DL (ref 0.9–1.8)
THYROID PEROXIDASE (TPO) ABS: 192 IU/ML
TOTAL IRON BINDING CAPACITY: 280 UG/DL (ref 260–445)
TOTAL PROTEIN: 6.8 G/DL (ref 6.4–8.2)
TSH SERPL DL<=0.05 MIU/L-ACNC: 2.29 UIU/ML (ref 0.27–4.2)
VITAMIN B-12: 369 PG/ML (ref 211–911)
VITAMIN D 25-HYDROXY: 42.6 NG/ML
WBC # BLD: 5.9 K/UL (ref 4–11)

## 2022-09-20 PROCEDURE — 83735 ASSAY OF MAGNESIUM: CPT

## 2022-09-20 PROCEDURE — 84443 ASSAY THYROID STIM HORMONE: CPT

## 2022-09-20 PROCEDURE — 84144 ASSAY OF PROGESTERONE: CPT

## 2022-09-20 PROCEDURE — 82627 DEHYDROEPIANDROSTERONE: CPT

## 2022-09-20 PROCEDURE — 86038 ANTINUCLEAR ANTIBODIES: CPT

## 2022-09-20 PROCEDURE — 84439 ASSAY OF FREE THYROXINE: CPT

## 2022-09-20 PROCEDURE — 84252 ASSAY OF VITAMIN B-2: CPT

## 2022-09-20 PROCEDURE — 84432 ASSAY OF THYROGLOBULIN: CPT

## 2022-09-20 PROCEDURE — 86664 EPSTEIN-BARR NUCLEAR ANTIGEN: CPT

## 2022-09-20 PROCEDURE — 82175 ASSAY OF ARSENIC: CPT

## 2022-09-20 PROCEDURE — 83825 ASSAY OF MERCURY: CPT

## 2022-09-20 PROCEDURE — 83018 HEAVY METAL QUAN EACH NES: CPT

## 2022-09-20 PROCEDURE — 85652 RBC SED RATE AUTOMATED: CPT

## 2022-09-20 PROCEDURE — 86663 EPSTEIN-BARR ANTIBODY: CPT

## 2022-09-20 PROCEDURE — 84630 ASSAY OF ZINC: CPT

## 2022-09-20 PROCEDURE — 82525 ASSAY OF COPPER: CPT

## 2022-09-20 PROCEDURE — 80053 COMPREHEN METABOLIC PANEL: CPT

## 2022-09-20 PROCEDURE — 86376 MICROSOMAL ANTIBODY EACH: CPT

## 2022-09-20 PROCEDURE — 86800 THYROGLOBULIN ANTIBODY: CPT

## 2022-09-20 PROCEDURE — 82533 TOTAL CORTISOL: CPT

## 2022-09-20 PROCEDURE — 84403 ASSAY OF TOTAL TESTOSTERONE: CPT

## 2022-09-20 PROCEDURE — 83550 IRON BINDING TEST: CPT

## 2022-09-20 PROCEDURE — 83002 ASSAY OF GONADOTROPIN (LH): CPT

## 2022-09-20 PROCEDURE — 86140 C-REACTIVE PROTEIN: CPT

## 2022-09-20 PROCEDURE — 82300 ASSAY OF CADMIUM: CPT

## 2022-09-20 PROCEDURE — 84207 ASSAY OF VITAMIN B-6: CPT

## 2022-09-20 PROCEDURE — 82306 VITAMIN D 25 HYDROXY: CPT

## 2022-09-20 PROCEDURE — 85025 COMPLETE CBC W/AUTO DIFF WBC: CPT

## 2022-09-20 PROCEDURE — 83655 ASSAY OF LEAD: CPT

## 2022-09-20 PROCEDURE — 86003 ALLG SPEC IGE CRUDE XTRC EA: CPT

## 2022-09-20 PROCEDURE — 84425 ASSAY OF VITAMIN B-1: CPT

## 2022-09-20 PROCEDURE — 83001 ASSAY OF GONADOTROPIN (FSH): CPT

## 2022-09-20 PROCEDURE — 84481 FREE ASSAY (FT-3): CPT

## 2022-09-20 PROCEDURE — 83540 ASSAY OF IRON: CPT

## 2022-09-20 PROCEDURE — 82746 ASSAY OF FOLIC ACID SERUM: CPT

## 2022-09-20 PROCEDURE — 84270 ASSAY OF SEX HORMONE GLOBUL: CPT

## 2022-09-20 PROCEDURE — 86665 EPSTEIN-BARR CAPSID VCA: CPT

## 2022-09-20 PROCEDURE — 82728 ASSAY OF FERRITIN: CPT

## 2022-09-20 PROCEDURE — 82671 ASSAY OF ESTROGENS: CPT

## 2022-09-20 PROCEDURE — 82607 VITAMIN B-12: CPT

## 2022-09-20 PROCEDURE — 82108 ASSAY OF ALUMINUM: CPT

## 2022-09-21 LAB
ANTI-NUCLEAR ANTIBODY (ANA): NEGATIVE
EPSTEIN BARR VIRUS NUCLEAR AB IGG: 36.7 U/ML (ref 0–21.9)
EPSTEIN-BARR EARLY ANTIGEN ANTIBODY: 73 U/ML (ref 0–10.9)
EPSTEIN-BARR VCA IGG: 152 U/ML (ref 0–21.9)
EPSTEIN-BARR VCA IGM: 17.4 U/ML (ref 0–43.9)
REASON FOR REJECTION: NORMAL
REJECTED TEST: NORMAL

## 2022-09-22 ENCOUNTER — HOSPITAL ENCOUNTER (OUTPATIENT)
Age: 57
Discharge: HOME | End: 2022-09-22
Payer: COMMERCIAL

## 2022-09-22 LAB
ALLERGEN SEE NOTE: NORMAL
ALUMINUM: <5 UG/L (ref 0–15)
ARSENIC BLOOD: <10 UG/L
CADMIUM: <1 UG/L
DHEAS (DHEA SULFATE): 133 UG/DL (ref 26–200)
IODINE, SERUM: 57.5 UG/L (ref 40–92)
LEAD LEVEL BLOOD: <2 UG/DL
MERCURY BLOOD: <2.5 UG/L
MISCELLANEOUS LAB TEST ORDER: NORMAL
SEX HORMONE BINDING GLOBULIN: 62 NMOL/L (ref 30–135)
TESTOSTERONE FREE-NONMALE: 1.3 PG/ML (ref 0.6–3.8)
TESTOSTERONE TOTAL: 11 NG/DL (ref 20–70)

## 2022-09-23 DIAGNOSIS — R10.12 LEFT UPPER QUADRANT ABDOMINAL PAIN: Primary | ICD-10-CM

## 2022-09-23 DIAGNOSIS — B27.00 GAMMAHERPESVIRAL MONONUCLEOSIS WITHOUT COMPLICATION: ICD-10-CM

## 2022-09-23 LAB
COPPER: 112.8 UG/DL (ref 80–155)
VITAMIN B2: 7 NMOL/L (ref 5–50)
ZINC: 70 UG/DL (ref 60–120)

## 2022-09-24 LAB — VITAMIN B1 WHOLE BLOOD: 98 NMOL/L (ref 70–180)

## 2022-09-25 LAB
ESTRADIOL LEVEL: 54.1 PG/ML
ESTROGEN TOTAL: 445 PG/ML
ESTRONE: 390.9 PG/ML
THYROGLOBULIN BY LC-MS/MS, SERUM/PLASMA: 13.5 NG/ML (ref 1.3–31.8)

## 2022-09-27 ENCOUNTER — HOSPITAL ENCOUNTER (OUTPATIENT)
Dept: ULTRASOUND IMAGING | Age: 57
Discharge: HOME | End: 2022-09-27
Payer: COMMERCIAL

## 2022-09-27 ENCOUNTER — HOSPITAL ENCOUNTER (OUTPATIENT)
Age: 57
Discharge: HOME | End: 2022-09-27
Payer: COMMERCIAL

## 2022-09-27 DIAGNOSIS — R10.12 LEFT UPPER QUADRANT ABDOMINAL PAIN: ICD-10-CM

## 2022-09-27 DIAGNOSIS — B27.00 GAMMAHERPESVIRAL MONONUCLEOSIS WITHOUT COMPLICATION: ICD-10-CM

## 2022-09-27 LAB
VITAMIN B6: 27.2 NMOL/L (ref 20–125)
WHITE BLOOD CELLS (WBC), STOOL: NORMAL

## 2022-09-27 PROCEDURE — 82653 EL-1 FECAL QUANTITATIVE: CPT

## 2022-09-27 PROCEDURE — 76700 US EXAM ABDOM COMPLETE: CPT

## 2022-09-27 PROCEDURE — 82705 FATS/LIPIDS FECES QUAL: CPT

## 2022-09-27 PROCEDURE — 87102 FUNGUS ISOLATION CULTURE: CPT

## 2022-09-27 PROCEDURE — 87329 GIARDIA AG IA: CPT

## 2022-09-27 PROCEDURE — 87336 ENTAMOEB HIST DISPR AG IA: CPT

## 2022-09-27 PROCEDURE — 87205 SMEAR GRAM STAIN: CPT

## 2022-09-27 PROCEDURE — 83630 LACTOFERRIN FECAL (QUAL): CPT

## 2022-09-27 PROCEDURE — 87328 CRYPTOSPORIDIUM AG IA: CPT

## 2022-09-27 PROCEDURE — 83993 ASSAY FOR CALPROTECTIN FECAL: CPT

## 2022-09-28 LAB
CRYPTOSPORIDIUM ANTIGEN STOOL: NORMAL
E HISTOLYTICA ANTIGEN STOOL: NORMAL
GIARDIA ANTIGEN STOOL: NORMAL

## 2022-09-28 PROCEDURE — 82533 TOTAL CORTISOL: CPT

## 2022-09-29 ENCOUNTER — HOSPITAL ENCOUNTER (OUTPATIENT)
Dept: CT IMAGING | Age: 57
Discharge: HOME | End: 2022-09-29
Payer: COMMERCIAL

## 2022-09-29 ENCOUNTER — HOSPITAL ENCOUNTER (OUTPATIENT)
Age: 57
Discharge: HOME | End: 2022-09-29
Payer: COMMERCIAL

## 2022-09-29 DIAGNOSIS — K31.89 SUBEPITHELIAL GASTRIC MASS: ICD-10-CM

## 2022-09-29 LAB
CARNITINE ESTER/FREE (RATIO): 0.2 (ref 0.1–1)
CARNITINE ESTERIFIED: 10 UMOL/L (ref 5–29)
CARNITINE FREE: 44 UMOL/L (ref 25–60)
CARNITINE TOTAL: 54 UMOL/L (ref 34–86)

## 2022-09-29 PROCEDURE — 82533 TOTAL CORTISOL: CPT

## 2022-09-29 PROCEDURE — 71260 CT THORAX DX C+: CPT

## 2022-09-29 PROCEDURE — 6360000004 HC RX CONTRAST MEDICATION: Performed by: INTERNAL MEDICINE

## 2022-09-29 PROCEDURE — 74160 CT ABDOMEN W/CONTRAST: CPT

## 2022-09-29 RX ADMIN — IOPAMIDOL 75 ML: 755 INJECTION, SOLUTION INTRAVENOUS at 14:49

## 2022-09-29 RX ADMIN — DIATRIZOATE MEGLUMINE AND DIATRIZOATE SODIUM 12 ML: 660; 100 LIQUID ORAL; RECTAL at 14:49

## 2022-10-01 LAB
CALPROTECTIN, FECAL: 114 UG/G
FECAL NEUTRAL FAT: NORMAL
FECAL SPLIT FATS: NORMAL

## 2022-10-02 LAB
CORTISOL SALIVARY: 0.08 UG/DL
CORTISOL SALIVARY: 0.11 UG/DL
CORTISOL SALIVARY: 1.01 UG/DL
REASON FOR REJECTION: NORMAL
REJECTED TEST: NORMAL

## 2022-10-04 LAB — PANCREATIC ELASTASE, FECAL: >800 UG/G

## 2022-10-10 ENCOUNTER — ANESTHESIA EVENT (OUTPATIENT)
Dept: ENDOSCOPY | Age: 57
End: 2022-10-10
Payer: COMMERCIAL

## 2022-10-11 ENCOUNTER — HOSPITAL ENCOUNTER (OUTPATIENT)
Age: 57
Setting detail: OUTPATIENT SURGERY
Discharge: HOME | End: 2022-10-11
Attending: INTERNAL MEDICINE | Admitting: INTERNAL MEDICINE
Payer: COMMERCIAL

## 2022-10-11 ENCOUNTER — ANESTHESIA (OUTPATIENT)
Dept: ENDOSCOPY | Age: 57
End: 2022-10-11
Payer: COMMERCIAL

## 2022-10-11 VITALS
TEMPERATURE: 97.3 F | HEART RATE: 66 BPM | RESPIRATION RATE: 16 BRPM | OXYGEN SATURATION: 96 % | HEIGHT: 66 IN | WEIGHT: 190 LBS | SYSTOLIC BLOOD PRESSURE: 95 MMHG | DIASTOLIC BLOOD PRESSURE: 60 MMHG | BODY MASS INDEX: 30.53 KG/M2

## 2022-10-11 DIAGNOSIS — K31.89 SUBEPITHELIAL GASTRIC MASS: ICD-10-CM

## 2022-10-11 PROCEDURE — 2580000003 HC RX 258: Performed by: NURSE ANESTHETIST, CERTIFIED REGISTERED

## 2022-10-11 PROCEDURE — 6360000002 HC RX W HCPCS: Performed by: NURSE ANESTHETIST, CERTIFIED REGISTERED

## 2022-10-11 PROCEDURE — 2580000003 HC RX 258: Performed by: ANESTHESIOLOGY

## 2022-10-11 PROCEDURE — 7100000011 HC PHASE II RECOVERY - ADDTL 15 MIN: Performed by: INTERNAL MEDICINE

## 2022-10-11 PROCEDURE — 3609012400 HC EGD TRANSORAL BIOPSY SINGLE/MULTIPLE: Performed by: INTERNAL MEDICINE

## 2022-10-11 PROCEDURE — 88305 TISSUE EXAM BY PATHOLOGIST: CPT

## 2022-10-11 PROCEDURE — 7100000010 HC PHASE II RECOVERY - FIRST 15 MIN: Performed by: INTERNAL MEDICINE

## 2022-10-11 PROCEDURE — 3609018500 HC EGD US SCOPE W/ADJACENT STRUCTURES: Performed by: INTERNAL MEDICINE

## 2022-10-11 PROCEDURE — 3700000000 HC ANESTHESIA ATTENDED CARE: Performed by: INTERNAL MEDICINE

## 2022-10-11 PROCEDURE — 2709999900 HC NON-CHARGEABLE SUPPLY: Performed by: INTERNAL MEDICINE

## 2022-10-11 PROCEDURE — 3700000001 HC ADD 15 MINUTES (ANESTHESIA): Performed by: INTERNAL MEDICINE

## 2022-10-11 RX ORDER — PANTOPRAZOLE SODIUM 40 MG/1
TABLET, DELAYED RELEASE ORAL
COMMUNITY
Start: 2022-09-03

## 2022-10-11 RX ORDER — SODIUM CHLORIDE, SODIUM LACTATE, POTASSIUM CHLORIDE, CALCIUM CHLORIDE 600; 310; 30; 20 MG/100ML; MG/100ML; MG/100ML; MG/100ML
INJECTION, SOLUTION INTRAVENOUS CONTINUOUS PRN
Status: DISCONTINUED | OUTPATIENT
Start: 2022-10-11 | End: 2022-10-11 | Stop reason: SDUPTHER

## 2022-10-11 RX ORDER — LIDOCAINE HYDROCHLORIDE 20 MG/ML
INJECTION, SOLUTION INTRAVENOUS PRN
Status: DISCONTINUED | OUTPATIENT
Start: 2022-10-11 | End: 2022-10-11 | Stop reason: SDUPTHER

## 2022-10-11 RX ORDER — PROPOFOL 10 MG/ML
INJECTION, EMULSION INTRAVENOUS PRN
Status: DISCONTINUED | OUTPATIENT
Start: 2022-10-11 | End: 2022-10-11 | Stop reason: SDUPTHER

## 2022-10-11 RX ORDER — PROPOFOL 10 MG/ML
INJECTION, EMULSION INTRAVENOUS CONTINUOUS PRN
Status: DISCONTINUED | OUTPATIENT
Start: 2022-10-11 | End: 2022-10-11 | Stop reason: SDUPTHER

## 2022-10-11 RX ORDER — SODIUM CHLORIDE, SODIUM LACTATE, POTASSIUM CHLORIDE, CALCIUM CHLORIDE 600; 310; 30; 20 MG/100ML; MG/100ML; MG/100ML; MG/100ML
INJECTION, SOLUTION INTRAVENOUS CONTINUOUS
Status: DISCONTINUED | OUTPATIENT
Start: 2022-10-11 | End: 2022-10-11 | Stop reason: HOSPADM

## 2022-10-11 RX ADMIN — LIDOCAINE HYDROCHLORIDE 100 MG: 20 INJECTION, SOLUTION INTRAVENOUS at 07:38

## 2022-10-11 RX ADMIN — SODIUM CHLORIDE, SODIUM LACTATE, POTASSIUM CHLORIDE, AND CALCIUM CHLORIDE: .6; .31; .03; .02 INJECTION, SOLUTION INTRAVENOUS at 07:31

## 2022-10-11 RX ADMIN — PROPOFOL 10 MG: 10 INJECTION, EMULSION INTRAVENOUS at 07:42

## 2022-10-11 RX ADMIN — SODIUM CHLORIDE, POTASSIUM CHLORIDE, SODIUM LACTATE AND CALCIUM CHLORIDE: 600; 310; 30; 20 INJECTION, SOLUTION INTRAVENOUS at 06:42

## 2022-10-11 RX ADMIN — PROPOFOL 20 MG: 10 INJECTION, EMULSION INTRAVENOUS at 07:41

## 2022-10-11 RX ADMIN — PROPOFOL 50 MG: 10 INJECTION, EMULSION INTRAVENOUS at 07:38

## 2022-10-11 RX ADMIN — PROPOFOL 150 MCG/KG/MIN: 10 INJECTION, EMULSION INTRAVENOUS at 07:38

## 2022-10-11 RX ADMIN — PROPOFOL 20 MG: 10 INJECTION, EMULSION INTRAVENOUS at 07:40

## 2022-10-11 ASSESSMENT — PAIN SCALES - GENERAL
PAINLEVEL_OUTOF10: 0
PAINLEVEL_OUTOF10: 0

## 2022-10-11 ASSESSMENT — LIFESTYLE VARIABLES: SMOKING_STATUS: 0

## 2022-10-11 ASSESSMENT — PAIN - FUNCTIONAL ASSESSMENT: PAIN_FUNCTIONAL_ASSESSMENT: 0-10

## 2022-10-11 NOTE — ANESTHESIA POSTPROCEDURE EVALUATION
Department of Anesthesiology  Postprocedure Note    Patient: Zane Melara  MRN: [id number]  YOB: 1965  Date of evaluation: 10/11/2022      Procedure Summary     Date: 10/11/22 Room / Location: [facility]    Anesthesia Start: 7120 Anesthesia Stop: 0817    Procedures:       ESOPHAGOGASTRODUODENOSCOPY ENDOSCOPIC ULTRASOUND      EGD BIOPSY Diagnosis:       Subepithelial gastric mass      (Subepithelial gastric mass [K31.89])    Surgeons: Jason Saunders MD Responsible Provider: Javier Crystal DO    Anesthesia Type: MAC ASA Status: 2          Anesthesia Type: No value filed.     Rosie Phase I: Rosie Score: 10    Rosie Phase II: Rosie Score: 10      Anesthesia Post Evaluation    Patient location during evaluation: PACU  Patient participation: complete - patient participated  Level of consciousness: awake  Pain score: 0  Airway patency: patent  Nausea & Vomiting: no nausea and no vomiting  Complications: no  Cardiovascular status: hemodynamically stable  Respiratory status: acceptable  Hydration status: stable

## 2022-10-11 NOTE — ANESTHESIA PRE PROCEDURE
due to COVID-19 virus U07.1, J12.82    Abnormal LFTs R79.89    Urinary tract infection without hematuria N39.0    RADHA (obstructive sleep apnea) G47.33    Personal history of COVID-19 Z86.16    Class 1 obesity in adult E66.9    Fatty infiltration of liver K76.0    Nephrolithiasis N20.0    Chronic rhinitis J31.0    Bilateral leg edema R60.0    Hepatic cyst K76.89       Past Medical History:        Diagnosis Date    Class 1 obesity in adult 10/29/2021    COVID-19 2020    Difficult intubation     states she's been told twice this.  Endometriosis     Fatty infiltration of liver 10/29/2021    Fibroid tumor     Nausea & vomiting     Nephrolithiasis 10/29/2021    PONV (postoperative nausea and vomiting)     Sleep apnea     wears CPAP    TMJ disease        Past Surgical History:        Procedure Laterality Date     SECTION      CHOLECYSTECTOMY  2013    laparoscopic    COLONOSCOPY  2021    COLONOSCOPY POLYPECTOMY SNARE/COLD BIOPSY performed by Henrietta Horta MD at [address] [place] [address]  2021    COLONOSCOPY POLYPECTOMY ABLATION performed by Henrietta Horta MD at [address] [place] [address] (CERVIX STATUS UNKNOWN)      OTHER SURGICAL HISTORY  2018    SUCTION DILATATION AND CURETTAGE HYSTEROSCOPY,    STUART AND BSO (CERVIX REMOVED)  2018     Robotic assisted laparoscopic hysterectomy, Bilateral salpingoophorectomy, Minilaparotomy for uterine extraction      UPPER GASTROINTESTINAL ENDOSCOPY N/A 2022    EGD BIOPSY performed by Henrietta Horta MD at [address] [facility] History:    Social History     Tobacco Use    Smoking status: Never    Smokeless tobacco: Never   Substance Use Topics    Alcohol use: No                                Counseling given: Not Answered      Vital Signs (Current): There were no vitals filed for this visit.                                            BP Readings from Last 3 Encounters:   10/11/22 (!) 107/57   09/02/22 102/73   08/24/22 92/60       NPO Status:                                                                                 BMI:   Wt Readings from Last 3 Encounters:   10/11/22 190 lb (86.2 kg)   09/02/22 190 lb (86.2 kg)   08/24/22 190 lb (86.2 kg)     There is no height or weight on file to calculate BMI.    CBC:   Lab Results   Component Value Date/Time    WBC 5.9 09/20/2022 09:14 AM    RBC 4.29 09/20/2022 09:14 AM    HGB 13.0 09/20/2022 09:14 AM    HCT 38.9 09/20/2022 09:14 AM    MCV 90.5 09/20/2022 09:14 AM    RDW 13.7 09/20/2022 09:14 AM     09/20/2022 09:14 AM       CMP:   Lab Results   Component Value Date/Time     09/20/2022 09:14 AM    K 4.2 09/20/2022 09:14 AM    K 3.9 12/24/2020 05:42 AM     09/20/2022 09:14 AM    CO2 26 09/20/2022 09:14 AM    BUN 16 09/20/2022 09:14 AM    CREATININE 0.8 09/20/2022 09:14 AM    GFRAA >60 09/20/2022 09:14 AM    GFRAA >60 04/09/2013 11:45 AM    AGRATIO 2.2 09/20/2022 09:14 AM    LABGLOM >60 09/20/2022 09:14 AM    GLUCOSE 83 09/20/2022 09:14 AM    PROT 6.8 09/20/2022 09:14 AM    PROT 7.0 10/03/2012 07:42 PM    CALCIUM 10.2 09/20/2022 09:14 AM    BILITOT 0.6 09/20/2022 09:14 AM    ALKPHOS 83 09/20/2022 09:14 AM    AST 12 09/20/2022 09:14 AM    ALT 12 09/20/2022 09:14 AM       POC Tests: No results for input(s): POCGLU, POCNA, POCK, POCCL, POCBUN, POCHEMO, POCHCT in the last 72 hours.     Coags:   Lab Results   Component Value Date/Time    PROTIME 11.6 02/23/2018 02:30 PM    INR 1.03 02/23/2018 02:30 PM    APTT 22.4 02/23/2018 02:30 PM       HCG (If Applicable):   Lab Results   Component Value Date    PREGTESTUR Negative 03/05/2018        ABGs: No results found for: PHART, PO2ART, JHD4BOF, UQE2KHS, BEART, E4PEVAMO     Type & Screen (If Applicable):  No results found for: LABABO, LABRH    Drug/Infectious Status (If Applicable):  No results found for: HIV, HEPCAB    COVID-19 Screening (If Applicable):   Lab Results   Component Value Date/Time    COVID19 POSITIVE 12/21/2020 12:00 AM           Anesthesia Evaluation  Patient summary reviewed and Nursing notes reviewed   history of anesthetic complications: PONV and history of difficult intubation. Airway: Mallampati: III  TM distance: >3 FB   Neck ROM: full  Mouth opening: > = 3 FB   Dental: normal exam         Pulmonary: breath sounds clear to auscultation  (+) sleep apnea:      (-) COPD, asthma and not a current smoker                           Cardiovascular:Negative CV ROS  Exercise tolerance: good (>4 METS),       (-) hypertension, past MI, CAD, CABG/stent, dysrhythmias,  angina,  CHF and orthopnea      Rhythm: regular  Rate: normal           Beta Blocker:  Not on Beta Blocker         Neuro/Psych:      (-) seizures, TIA and CVA           GI/Hepatic/Renal:   (+) liver disease (fatt liver):,      (-) hepatitis and no renal disease       Endo/Other:    (+) no malignancy/cancer. (-) diabetes mellitus, hypothyroidism, hyperthyroidism, no malignancy/cancer                ROS comment: High thyroid antibodies, in process of testing Abdominal:             Vascular:     - DVT and PE. Other Findings:             Anesthesia Plan      MAC     ASA 2       Induction: intravenous. Anesthetic plan and risks discussed with patient. Plan discussed with CRNA.     Attending anesthesiologist reviewed and agrees with Preprocedure content                Sarah Johnson DO   10/11/2022

## 2022-10-11 NOTE — DISCHARGE INSTRUCTIONS
ENDOSCOPY DISCHARGE INSTRUCTIONS:    Call the physician that did your procedure for any questions or concern:    [facility]: [phone]  DR. RODRIGUEZ [facility]        ACTIVITY:    There are potential side effects to the medications used for sedation and anesthesia during your procedure. These include:  Dizziness or light-headedness, confusion or memory loss, delayed reaction times, loss of coordination, nausea and vomiting. Because of your increased risk for injury, we ask that you observe the following precautions: For the next 24 hours,  DO NOT operate an automobile, bicycle, motorcycle, , power tools or large equipment of any kind. Do not drink alcohol, sign any legal documents or make any legal decisions for 24 hours. Do not bend your head over lower than your heart. DO sit on the side of bed/couch awhile before getting up. Plan on bedrest or quiet relaxation today. You may resume normal activities in 24 hours. DIET:    Your first meal today should be light, avoiding spicy and fatty foods. If you tolerate this first meal, then you may advance to your regular diet unless otherwise advised by your physician. NORMAL SYMPTOMS:  -Mild sore throat if youve had an EGD   -Gaseous discomfort    NOTIFY YOUR PHYSICIAN IF THESE SYMPTOMS OCCUR:  1. Fever (greater than 100)  5. Increased abdominal bloating  2. Severe pain    6. Excessive bleeding  3. Nausea and vomiting  7. Chest pain                                                                    4. Chills    8. Shortness of breath    ADDITIONAL INSTRUCTIONS:    Biopsy results: Call [address],[facility] for biopsy results in 1 week    Educational Information:    EGD Findings:  Esophagus:  Visualization of the esophagus demonstrated small islands of salmon-colored mucosa seen near the GE junction suggestive of Hyde's esophagus.   GE junction at 40 cm  Stomach: Just beyond the GE junction in the gastric cardia, a 2 cm submucosal nodule seen , biopsies were obtained. The scope was then advanced into the stomach. Bile-stained secretions seen in the stomach which was suctioned. Mild erythema seen in the antrum. Pylorus was normal..  Duodenum: Visualization of the duodenal bulb and the second portion of the duodenum demonstrated normal mucosa. Ampulla was normal.     EUS Findings:  Exam performed with radial and linear Olympus echoendoscope   AP window and subcarinal window was unremarkable  The celiac takeoff was unremarkable  Submucosal lesion seen beyond GE junction in the gastric cardia was anechoic on exam with some debris, arising from submucosal/third layer, measured 2.5 cm x 1.2 cm. 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 Cindi Stroud as scheduled  Continue with PPI  Continue with MiraLAX/bowel regimen for chronic constipation         Please review these discharge instructions this evening or tomorrow for  information you may have forgotten. We want to thank you for choosing the [facility] as your health care provider. We always strive to provide you with excellent care while you are here. You may receive a survey in the mail regarding your care. We would appreciate you taking a few minutes of your time to complete this survey.

## 2022-10-11 NOTE — H&P
Pre-operative History and Physical    Patient: Marlen Patricio  : 1965  Acct#:     History Obtained From:  patient    HISTORY OF PRESENT ILLNESS:    The patient is a 56 y.o. female who presents with epigastric pain, thickening at [facility]    Past Medical History:        Diagnosis Date    Class 1 obesity in adult 10/29/2021    COVID-19 2020    Difficult intubation     states she's been told twice this. Endometriosis     Fatty infiltration of liver 10/29/2021    Fibroid tumor     Nausea & vomiting     Nephrolithiasis 10/29/2021    PONV (postoperative nausea and vomiting)     Sleep apnea     wears CPAP    TMJ disease      Past Surgical History:        Procedure Laterality Date     SECTION      CHOLECYSTECTOMY  2013    laparoscopic    COLONOSCOPY  2021    COLONOSCOPY POLYPECTOMY SNARE/COLD BIOPSY performed by Aliyah Feng MD at [place] [address]  2021    COLONOSCOPY POLYPECTOMY ABLATION performed by Aliyah Feng MD at [address] (CERVIX STATUS UNKNOWN)      OTHER SURGICAL HISTORY  2018    SUCTION DILATATION AND CURETTAGE HYSTEROSCOPY,    STUART AND BSO (CERVIX REMOVED)  2018     Robotic assisted laparoscopic hysterectomy, Bilateral salpingoophorectomy, Minilaparotomy for uterine extraction      UPPER GASTROINTESTINAL ENDOSCOPY N/A 2022    EGD BIOPSY performed by Aliyah Feng MD at [facility] ENDOSCOPY     Medications Prior to Admission:   No current facility-administered medications on file prior to encounter.      Current Outpatient Medications on File Prior to Encounter   Medication Sig Dispense Refill    pantoprazole (PROTONIX) 40 MG tablet       terbinafine (LAMISIL) 250 MG tablet Take 250 mg by mouth daily      traZODone (DESYREL) 50 MG tablet       Cholecalciferol (VITAMIN D3) 50 MCG ( UT) CAPS Take by mouth (Patient not taking: Reported on 10/11/2022) magnesium gluconate (MAGONATE) 500 MG tablet Take 500 mg by mouth 2 times daily (Patient not taking: Reported on 10/11/2022)      Multiple Vitamins-Minerals (THERAPEUTIC MULTIVITAMIN-MINERALS) tablet Take 1 tablet by mouth daily (Patient not taking: Reported on 10/11/2022)      estradiol (ESTRACE) 1 MG tablet Take 0.5 mg by mouth daily      fexofenadine (ALLEGRA) 180 MG tablet Take 180 mg by mouth daily       Allergies:  Patient has no known allergies. History of allergic reaction to anesthesia:  No      PHYSICAL EXAM:      BP (!) 107/57   Pulse 63   Temp 97.7 °F (36.5 °C) (Tympanic)   Resp 18   Ht 5' 6\" (1.676 m)   Wt 190 lb (86.2 kg)   LMP 08/14/2018 (Exact Date)   SpO2 98%   BMI 30.67 kg/m²  I        Heart:  Normal apical impulse, regular rate and rhythm, normal S1 and S2, no S3 or S4, and no murmur noted    Lungs:  No increased work of breathing, good air exchange, clear to auscultation bilaterally, no crackles or wheezing    Abdomen:  No scars, normal bowel sounds, soft, non-distended, non-tender, no masses palpated, no hepatosplenomegally      ASA Grade:  ASA 2 - Patient with mild systemic disease with no functional limitations      ASSESSMENT AND PLAN:    1. Patient is a 56 y.o. female here for EGD / EUS with deep sedation  2. Procedure options, risks and benefits reviewed with patient. Patient expresses understanding.           Anh Evans MD  [id number]  [phone]

## 2022-10-20 ENCOUNTER — HOSPITAL ENCOUNTER (OUTPATIENT)
Age: 57
Discharge: HOME | End: 2022-10-20
Payer: COMMERCIAL

## 2022-10-20 LAB
A/G RATIO: 2 (ref 1.1–2.2)
ALBUMIN SERPL-MCNC: 4.8 G/DL (ref 3.4–5)
ALP BLD-CCNC: 86 U/L (ref 40–129)
ALT SERPL-CCNC: 13 U/L (ref 10–40)
ANION GAP SERPL CALCULATED.3IONS-SCNC: 8 MMOL/L (ref 3–16)
AST SERPL-CCNC: 15 U/L (ref 15–37)
BILIRUB SERPL-MCNC: 0.7 MG/DL (ref 0–1)
BUN BLDV-MCNC: 17 MG/DL (ref 7–20)
CALCIUM SERPL-MCNC: 11.1 MG/DL (ref 8.3–10.6)
CHLORIDE BLD-SCNC: 103 MMOL/L (ref 99–110)
CO2: 29 MMOL/L (ref 21–32)
CREAT SERPL-MCNC: 0.8 MG/DL (ref 0.6–1.1)
GFR SERPL CREATININE-BSD FRML MDRD: >60 ML/MIN/{1.73_M2}
GLUCOSE BLD-MCNC: 90 MG/DL (ref 70–99)
POTASSIUM SERPL-SCNC: 4.1 MMOL/L (ref 3.5–5.1)
SODIUM BLD-SCNC: 140 MMOL/L (ref 136–145)
TOTAL PROTEIN: 7.2 G/DL (ref 6.4–8.2)

## 2022-10-20 PROCEDURE — 80053 COMPREHEN METABOLIC PANEL: CPT

## 2022-10-20 PROCEDURE — 36415 COLL VENOUS BLD VENIPUNCTURE: CPT

## 2022-10-24 LAB
FUNGUS (MYCOLOGY) CULTURE: NORMAL
FUNGUS STAIN: NORMAL

## 2022-11-01 ENCOUNTER — TELEPHONE (OUTPATIENT)
Dept: PULMONOLOGY | Age: 57
End: 2022-11-01

## 2022-11-01 DIAGNOSIS — G47.33 OSA (OBSTRUCTIVE SLEEP APNEA): Primary | ICD-10-CM

## 2022-11-01 NOTE — TELEPHONE ENCOUNTER
Pt LVM stating [facility] told her they need new orders sent to them for her CPAP supplies. OK to put in? Orders pended below. Pt last seen May 2022.

## 2022-11-04 ENCOUNTER — TELEPHONE (OUTPATIENT)
Dept: PULMONOLOGY | Age: 57
End: 2022-11-04

## 2022-11-04 NOTE — TELEPHONE ENCOUNTER
All orders notes and [employer] printed order that was signed by Omar Gao was also faxed as well as chart notes. Called pt. And made her aware.

## 2022-11-04 NOTE — TELEPHONE ENCOUNTER
Patient states she just talked to [facility] and they told her they have not received anything from us about her supplies or office notes that they need to get them to her, she has been out for awhile. They told her they need order for filters,head gear,cushions and the water tub chamber. They also need new chart notes. Please advise and call her when this is done.  Thanks

## 2022-11-23 ENCOUNTER — OFFICE VISIT (OUTPATIENT)
Dept: FAMILY MEDICINE CLINIC | Age: 57
End: 2022-11-23
Payer: COMMERCIAL

## 2022-11-23 VITALS — DIASTOLIC BLOOD PRESSURE: 70 MMHG | WEIGHT: 190 LBS | BODY MASS INDEX: 30.67 KG/M2 | SYSTOLIC BLOOD PRESSURE: 110 MMHG

## 2022-11-23 DIAGNOSIS — R53.83 FATIGUE, UNSPECIFIED TYPE: Primary | ICD-10-CM

## 2022-11-23 DIAGNOSIS — Z83.2 FAMILY HISTORY OF BLEEDING DISORDER: ICD-10-CM

## 2022-11-23 DIAGNOSIS — B35.3 TINEA PEDIS OF LEFT FOOT: ICD-10-CM

## 2022-11-23 DIAGNOSIS — F51.01 PRIMARY INSOMNIA: ICD-10-CM

## 2022-11-23 DIAGNOSIS — B27.90 EBV INFECTION: ICD-10-CM

## 2022-11-23 PROCEDURE — 99214 OFFICE O/P EST MOD 30 MIN: CPT

## 2022-11-23 RX ORDER — TERBINAFINE HYDROCHLORIDE 250 MG/1
250 TABLET ORAL DAILY
Qty: 14 TABLET | Refills: 0 | Status: SHIPPED | OUTPATIENT
Start: 2022-11-23

## 2022-11-23 RX ORDER — TRAZODONE HYDROCHLORIDE 50 MG/1
50 TABLET ORAL NIGHTLY PRN
Qty: 90 TABLET | Refills: 1 | Status: SHIPPED | OUTPATIENT
Start: 2022-11-23

## 2022-11-23 ASSESSMENT — PATIENT HEALTH QUESTIONNAIRE - PHQ9
SUM OF ALL RESPONSES TO PHQ9 QUESTIONS 1 & 2: 0
8. MOVING OR SPEAKING SO SLOWLY THAT OTHER PEOPLE COULD HAVE NOTICED. OR THE OPPOSITE, BEING SO FIGETY OR RESTLESS THAT YOU HAVE BEEN MOVING AROUND A LOT MORE THAN USUAL: 0
9. THOUGHTS THAT YOU WOULD BE BETTER OFF DEAD, OR OF HURTING YOURSELF: 0
1. LITTLE INTEREST OR PLEASURE IN DOING THINGS: 0
7. TROUBLE CONCENTRATING ON THINGS, SUCH AS READING THE NEWSPAPER OR WATCHING TELEVISION: 0
10. IF YOU CHECKED OFF ANY PROBLEMS, HOW DIFFICULT HAVE THESE PROBLEMS MADE IT FOR YOU TO DO YOUR WORK, TAKE CARE OF THINGS AT HOME, OR GET ALONG WITH OTHER PEOPLE: 0
SUM OF ALL RESPONSES TO PHQ QUESTIONS 1-9: 0
5. POOR APPETITE OR OVEREATING: 0
4. FEELING TIRED OR HAVING LITTLE ENERGY: 0
3. TROUBLE FALLING OR STAYING ASLEEP: 0
SUM OF ALL RESPONSES TO PHQ QUESTIONS 1-9: 0
2. FEELING DOWN, DEPRESSED OR HOPELESS: 0
6. FEELING BAD ABOUT YOURSELF - OR THAT YOU ARE A FAILURE OR HAVE LET YOURSELF OR YOUR FAMILY DOWN: 0
SUM OF ALL RESPONSES TO PHQ QUESTIONS 1-9: 0
SUM OF ALL RESPONSES TO PHQ QUESTIONS 1-9: 0

## 2022-11-23 ASSESSMENT — ENCOUNTER SYMPTOMS
EYE DISCHARGE: 0
ABDOMINAL DISTENTION: 0
ABDOMINAL PAIN: 0
SORE THROAT: 0
CONSTIPATION: 0
DIARRHEA: 0
SHORTNESS OF BREATH: 0
CHEST TIGHTNESS: 0
COLOR CHANGE: 0
COUGH: 0
EYE PAIN: 0

## 2022-11-23 NOTE — PROGRESS NOTES
[facility]    2022    Venkat Parham (:  1965) is a 56 y.o. female, here for the following medical concerns:    Chief Complaint   Patient presents with    Thyroid Problem        ASSESSMENT/ PLAN  1. Fatigue, unspecified type  -Is much improved since her last office visit.  -Continue to follow advice with Dr. Suki Virgen of dietary changes. She does have a follow-up visit with her on Monday.  -We will defer repeat thyroid panel to endocrinology. 2. Primary insomnia  -Stable and controlled on trazodone, refilled. - traZODone (DESYREL) 50 MG tablet; Take 1 tablet by mouth nightly as needed for Sleep  Dispense: 90 tablet; Refill: 1    3. EBV infection  -Noted to be a factor in the cause of #1. Her last office visit. -Resolved. 4. Tinea pedis of left foot  -Has been on p.o. Lamisil prior for a total of 4 weeks; however, she did break this therapy because of risk of photophobia when she did go on vacation to [address] for 2 weeks to complete 6-week therapy.  -Hepatic panel ordered at the end of therapy to assess LFTs. - terbinafine (LAMISIL) 250 MG tablet; Take 1 tablet by mouth daily  Dispense: 14 tablet; Refill: 0  - Hepatic Function Panel; Future    5. Family history of bleeding disorder  -Daughter was recently diagnosed with von Willebrand disease.  -She does have a history of endometriosis and bleeding fibroids. She has since had a total hysterectomy. We will screen for von Willebrand    - VON WILLEBRAND PANEL; Future  - VON WILLEBRAND ANTIGEN; Future          Imaging:    US THYROID 2022    FINDINGS:   Right thyroid lobe:  4.9 x 1.7 x 1.5 cm       Left thyroid lobe:  5.3 x 1.7 x 1.6 cm       Isthmus:  0.6 cm       Thyroid Gland:  Thyroid gland demonstrates diffuse heterogenous echotexture   of the thyroid gland. Nodules: No discrete sonographically measurable focal thyroid nodule seen.        Cervical lymphadenopathy: No abnormal lymph nodes in the imaged portions of   the neck. Impression   Nonspecific heterogenous echotexture of the thyroid gland, which may reflect   underlying conditions diffusely affecting thyroid gland such as Graves   disease or Hashimoto's thyroiditis. Correlation with thyroid function tests   is recommended. MAMMO 3/2/2022    Impression   No mammographic evidence of malignancy. BIRADS:   BIRADS - CATEGORY 2       Benign Findings. Normal interval follow-up is recommended in 12 months. OVERALL ASSESSMENT - BENIGN       A letter of notification will be sent to the patient regarding the results. The [employer] recommends annual mammograms for women 40   years and older. MRI ABDOMEN 11/3/2021    Organs: Previous identified 9 mm segment 3 lesion is seen to represent a   cyst.  Gallbladder is surgically absent. No biliary ductal dilatation. Pancreas is unremarkable. Adrenals are unremarkable. Spleen is normal in   size. Kidneys are unremarkable. Vasculature is unremarkable. GI/Bowel: Bowel is non-dilated without wall thickening. Peritoneum/Retroperitoneum:No free fluid, free air, organized fluid   collection or lymphadenopathy. Bones: Multilevel degenerative disc disease. Impression   Previously identified 9 mm segment 3 hepatic lesion is seen to represent a   cyst.       ECHO 12/21/2021    Summary   Normal left ventricular systolic function with an estimated ejection   fraction of 55-60%. No regional wall motion abnormalities are seen. Normal left ventricular diastolic filling pressure. Mild tricuspid regurgitation. Normal systolic pulmonary artery pressure (SPAP) estimated at 30 mmHg (RA   pressure 8 mmHg). No significant valvular heart disease. Right ventricular size and systolic function are normal.      Signature         Return in about 6 months (around 5/23/2023) for Regular follow up. Maria Antonia WEINER  Patient is here to establish care. Today she comes in with chief complaint of fatigue that has been worsening over the past 6 weeks. 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 She did follow-up with her primary care provider at that they did order a ultrasound thyroid that did show some changes and echogenicity that could be consistent with Graves' or Hashimoto's. She has noticed a widening gap between her TSH and her T4. She has never been evaluated for thyroid problems in the past.  However, since her fatigue has been worsening she is concerned that she has a thyroid problem. She also reports that she was placed on trazodone and Lexapro at the same time about 5 months ago by her prior PCP. She was placed on medications due to sleep apnea and anxiety secondary to her son being overseas in the [place] [employer]. She states that trazodone has helped compliance with her CPAP. She is now sleeping with her CPAP regularly. She also has a history of a liver cyst that was evaluated by MRI. See above    Previously had ultrasounds of bilateral lower extremities for edema in bilateral lower extremities. Negative for DVT. She also did have echocardiogram and December 2021 which was normal.    Total Hysterectomy in 2018. On estrace 0.5 mg.     RADHA, follows with Dr. Mariajose Jacques at [facility]. Wearing CPAP     Has appointment with endocrinology on 9/19. Interval Hx (11/23/2022)  Since her last OV she followed up with Endocrinology who found that he had EBV and concluded that she did not have a thyroid issue. The doctor did mention to her that she \"had a leaky gut\" and that she should change her stressful occupation as well as switch to whole foods for diet. She did take the advice of the physician and has since felt much better.   She no longer takes her Lexapro as she states that she no longer Difficult intubation     states she's been told twice this.     Endometriosis     Fatty infiltration of liver 10/29/2021    Fibroid tumor     Nausea & vomiting     Nephrolithiasis 10/29/2021    PONV (postoperative nausea and vomiting)     Sleep apnea     wears CPAP    TMJ disease        Patient Active Problem List   Diagnosis    DUB (dysfunctional uterine bleeding)    Fibroid uterus    Post-op pain    Pneumonia due to COVID-19 virus    Abnormal LFTs    Urinary tract infection without hematuria    RADHA (obstructive sleep apnea)    Personal history of COVID-19    Class 1 obesity in adult    Fatty infiltration of liver    Nephrolithiasis    Chronic rhinitis    Bilateral leg edema    Hepatic cyst       Past Surgical History:   Procedure Laterality Date     SECTION      CHOLECYSTECTOMY  2013    laparoscopic    COLONOSCOPY  2021    COLONOSCOPY POLYPECTOMY SNARE/COLD BIOPSY performed by Ralph Rojas MD at [address] [place] [id number]  2021    COLONOSCOPY POLYPECTOMY ABLATION performed by Ralph Rojas MD at [address] (CERVIX STATUS UNKNOWN)      OTHER SURGICAL HISTORY  2018    SUCTION DILATATION AND CURETTAGE HYSTEROSCOPY,    STUART AND BSO (CERVIX REMOVED)  2018     Robotic assisted laparoscopic hysterectomy, Bilateral salpingoophorectomy, Minilaparotomy for uterine extraction      UPPER GASTROINTESTINAL ENDOSCOPY N/A 2022    EGD BIOPSY performed by Ralph Rojas MD at [place] [address] N/A 10/11/2022    ESOPHAGOGASTRODUODENOSCOPY ENDOSCOPIC ULTRASOUND performed by Anjel Chery MD at [place] [address] N/A 10/11/2022    EGD BIOPSY performed by Anjel Chery MD at [address] [facility] History     Socioeconomic History    Marital status:      Spouse name: Not on file    Number of children: Not on file    Years of education: Not on file    Highest education level: Not on file   Occupational History    Not on file   Tobacco Use    Smoking status: Never    Smokeless tobacco: Never   Vaping Use    Vaping Use: Never used   Substance and Sexual Activity    Alcohol use: No    Drug use: No    Sexual activity: Yes     Partners: Male   Other Topics Concern    Not on file   Social History Narrative    Not on file     Social Determinants of Health     Financial Resource Strain: Not on file   Food Insecurity: Not on file   Transportation Needs: Not on file   Physical Activity: Not on file   Stress: Not on file   Social Connections: Not on file   Intimate Partner Violence: Not on file   Housing Stability: Not on file        Family History   Problem Relation Age of Onset    High Cholesterol Mother     Diabetes Father     Kidney Disease Father     High Cholesterol Father     Breast Cancer Maternal Aunt     Breast Cancer Paternal Cousin        PE  Vitals:    11/23/22 1449   BP: 110/70   Site: Left Upper Arm   Position: Sitting   Cuff Size: Medium Adult   Weight: 190 lb (86.2 kg)     Estimated body mass index is 30.67 kg/m² as calculated from the following:    Height as of 10/11/22: 5' 6\" (1.676 m). Weight as of this encounter: 190 lb (86.2 kg). Physical Exam  Constitutional:       General: She is not in acute distress. Appearance: Normal appearance. She is not ill-appearing. HENT:      Head: Normocephalic. Right Ear: Tympanic membrane and external ear normal.      Left Ear: Tympanic membrane and external ear normal.      Nose: No congestion or rhinorrhea. Mouth/Throat:      Mouth: Mucous membranes are moist.      Pharynx: Oropharynx is clear. No posterior oropharyngeal erythema. Eyes:      Extraocular Movements: Extraocular movements intact. Conjunctiva/sclera: Conjunctivae normal.      Pupils: Pupils are equal, round, and reactive to light. Cardiovascular:      Rate and Rhythm: Normal rate and regular rhythm. Pulses: Normal pulses. Heart sounds: Normal heart sounds. No murmur heard. Pulmonary:      Effort: Pulmonary effort is normal. No respiratory distress. Breath sounds: Normal breath sounds. No wheezing. Abdominal:      General: Abdomen is flat. Bowel sounds are normal.      Palpations: Abdomen is soft. Tenderness: There is no abdominal tenderness. Hernia: No hernia is present. Musculoskeletal:         General: No swelling. Normal range of motion. Cervical back: Normal range of motion and neck supple. No tenderness. Right lower leg: No edema. Left lower leg: No edema. Lymphadenopathy:      Cervical: No cervical adenopathy. Skin:     General: Skin is warm and dry. Capillary Refill: Capillary refill takes less than 2 seconds. Neurological:      General: No focal deficit present. Mental Status: She is alert and oriented to person, place, and time. Mental status is at baseline. Cranial Nerves: No cranial nerve deficit.    Psychiatric:         Mood and Affect: Mood normal.         Behavior: Behavior normal.         Judgment: Judgment normal.       Immunization History   Administered Date(s) Administered    COVID-19, PFIZER PURPLE top, DILUTE for use, (age 15 y+), 30mcg/0.3mL 06/11/2021, 07/02/2021    Influenza Vaccine, unspecified formulation 10/01/2017    Influenza Virus Vaccine 10/11/2019, 11/11/2021       Health Maintenance   Topic Date Due    DTaP/Tdap/Td vaccine (1 - Tdap) 11/23/2016    COVID-19 Vaccine (3 - Booster for Pfizer series) 08/27/2021    Flu vaccine (1) 08/01/2022    Hepatitis C screen  08/24/2023 (Originally 7/15/1983)    HIV screen  08/24/2023 (Originally 7/15/1980)    Depression Screen  08/24/2023    Breast cancer screen  03/02/2024    Lipids  07/25/2027    Colorectal Cancer Screen  07/30/2031    Shingles vaccine  Completed    Hepatitis A vaccine  Aged Out    Hib vaccine  Aged Out    Meningococcal (ACWY) vaccine  Aged Out    Pneumococcal 0-64 years Vaccine  Aged Out       PSH, PMH, SH and FH reviewed and noted. Recent and past labs, tests and consults also reviewed. Recent or new meds also reviewed. Alaina Lovett, APRN - CNP    This dictation was generated by voice recognition computer software. Although all attempts are made to edit the dictation for accuracy, there may be errors in the transcription that are not intended.

## 2022-12-27 ENCOUNTER — HOSPITAL ENCOUNTER (OUTPATIENT)
Age: 57
Discharge: HOME | End: 2022-12-27
Payer: COMMERCIAL

## 2022-12-27 DIAGNOSIS — Z83.2 FAMILY HISTORY OF BLEEDING DISORDER: ICD-10-CM

## 2022-12-27 DIAGNOSIS — B35.3 TINEA PEDIS OF LEFT FOOT: ICD-10-CM

## 2022-12-27 LAB
ALBUMIN SERPL-MCNC: 4.4 G/DL (ref 3.4–5)
ALP BLD-CCNC: 80 U/L (ref 40–129)
ALT SERPL-CCNC: 11 U/L (ref 10–40)
AST SERPL-CCNC: 11 U/L (ref 15–37)
BILIRUB SERPL-MCNC: <0.2 MG/DL (ref 0–1)
BILIRUBIN DIRECT: <0.2 MG/DL (ref 0–0.3)
BILIRUBIN, INDIRECT: ABNORMAL MG/DL (ref 0–1)
TOTAL PROTEIN: 6.8 G/DL (ref 6.4–8.2)

## 2022-12-27 PROCEDURE — 85246 CLOT FACTOR VIII VW ANTIGEN: CPT

## 2022-12-27 PROCEDURE — 85240 CLOT FACTOR VIII AHG 1 STAGE: CPT

## 2022-12-27 PROCEDURE — 85245 CLOT FACTOR VIII VW RISTOCTN: CPT

## 2022-12-27 PROCEDURE — 80076 HEPATIC FUNCTION PANEL: CPT

## 2022-12-27 PROCEDURE — 36415 COLL VENOUS BLD VENIPUNCTURE: CPT

## 2022-12-30 LAB
FACTOR VIII ACTIVITY: 74 % (ref 56–191)
VON WILLEBRAND ACTIVITY RCF: 69 % (ref 51–215)
VON WILLEBRAND AG: 90 % (ref 52–214)

## 2023-03-06 ENCOUNTER — HOSPITAL ENCOUNTER (OUTPATIENT)
Dept: WOMENS IMAGING | Age: 58
Discharge: HOME | End: 2023-03-06
Payer: COMMERCIAL

## 2023-03-06 DIAGNOSIS — Z12.31 ENCOUNTER FOR SCREENING MAMMOGRAM FOR BREAST CANCER: ICD-10-CM

## 2023-03-06 PROCEDURE — 77067 SCR MAMMO BI INCL CAD: CPT

## 2023-03-11 NOTE — PROGRESS NOTES
Socioeconomic History    Marital status:      Spouse name: Not on file    Number of children: Not on file    Years of education: Not on file    Highest education level: Not on file   Occupational History    Not on file   Social Needs    Financial resource strain: Not on file    Food insecurity:     Worry: Not on file     Inability: Not on file    Transportation needs:     Medical: Not on file     Non-medical: Not on file   Tobacco Use    Smoking status: Never Smoker    Smokeless tobacco: Never Used   Substance and Sexual Activity    Alcohol use: No    Drug use: No    Sexual activity: Yes     Partners: Male   Lifestyle    Physical activity:     Days per week: Not on file     Minutes per session: Not on file    Stress: Not on file   Relationships    Social connections:     Talks on phone: Not on file     Gets together: Not on file     Attends [employer] service: Not on file     Active member of club or organization: Not on file     Attends meetings of clubs or organizations: Not on file     Relationship status: Not on file    Intimate partner violence:     Fear of current or ex partner: Not on file     Emotionally abused: Not on file     Physically abused: Not on file     Forced sexual activity: Not on file   Other Topics Concern    Not on file   Social History Narrative    Not on file       Family History   Problem Relation Age of Onset    High Cholesterol Mother     Diabetes Father     Kidney Disease Father     High Cholesterol Father        Current Outpatient Medications   Medication Sig Dispense Refill    estradiol (ESTRACE) 1 MG tablet Take 1 mg by mouth daily      meloxicam (MOBIC) 15 MG tablet Take 1 tablet by mouth daily 30 tablet 3    fexofenadine (ALLEGRA ALLERGY) 180 MG tablet Take 180 mg by mouth daily      fluticasone (FLONASE) 50 MCG/ACT nasal spray 1 spray by Nasal route as needed for Rhinitis      docusate sodium (COLACE) 100 MG capsule Take 1 capsule by mouth 2
4 = No assist / stand by assistance

## 2023-04-27 NOTE — PROGRESS NOTES
[address] [facility] Adult  Hospitalist Group                                                                                          Hospitalist Progress Note  Antwan Peña MD  Answering service: [phone] OR [phone] from in house phone        Date of Service:  2023  NAME:  Ivanna Gonzalez  :  1984  MRN:  [id number]       Admission Summary:   42 y.o. male who presents with abdominal pain. He has a history of metastatic testicular cancer. He was recently admitted for a pseudocyst of the pancreas after undergoing a surveillance CT for testicular cancer, though no clear history of pancreatitis. He was discharged and was doing well. He returned with acute abdominal pain and was found to have a hemorrhagic pseudocyst.       Interval history / Subjective:   Seen and examined earlier today by me for follow-up of pancreatic pseudocyst with hemorrhage. Patient reports vast improvement in pain. He is sitting more comfortably. He is agreeable to starting to dial down his pain medicine. No chills or fevers overnight. Patient tolerating clear liquid diet. Assessment & Plan:     Pancreatic pseudocyst with hemorrhage:  -s/p EUS w/ FNA cytology showing inflammatory cells  IR to place drain today  -pain control and antiemetics - improving  -appreciate GI/surgery consultants  -trend H/H - Hb dropped today- ? Dilutional effects or slow continued bleed into hemorrhagic cyst  -CTA  without active hemorrhage- repeat CT 23 showing little change  -Patient wanted to try p.o.  Dilaudid instead of IV so I have added this and held IV Dilaudid  -Soft and low-fat diet started  -Drain shows hemorrhagic/bloody fluid  -Monitor labs    Hypokalemia:-replete PRN     UC: -continue mesalamine     Hx of Stage IIIb testicular cancer     Code status: full  Prophylaxis: SCDs  Care Plan discussed with: pt   Anticipated Disposition: home likely 48 hours or more  Inpatient  Cardiac monitoring: Remote Telemetry Chief Complaint    Shoulder Pain (Right shoulder)      History of Present Illness:  Angel Riggins is a 48 y.o. female presenting for evaluation of three year history of right shoulder pain. Patient reports she was cleaning her house and moving furniture in March of 2016 (3 years ago) when she began to feel soreness and stiffness in her right shoulder. She reports this pain has persisted and gradually worsened in severity prompting her to see a chiropractor for rehab over the past few years. She reports most of her difficulty is with internal rotation, claiming it is difficult for her to unhook her bra strap and reach for her purse in the back seat of her car. She also notes difficulty unplugging things and pulling items toward her. She notes occasional pain that radiates down to the elbow, forearm, and wrist and admits to difficulty sleeping sometimes. She denies numbness or tingling. She reports her chiropractor has had her undergo acupuncture, massage therapy, and electrostimulation over the last 3 years; all of which have not helped and she feels as though her stiffness has increased over the past 6 months. She denies ever seeing an orthopedic physician before her appointment today, and was referred by Dr. Lyndon Barnard who is acting as her PCP. She reports that she has avoided seeing an orthopedic physician or going to physical therapy due to cost. She denies history of shoulder surgeries or cortisone injections. She denies heart disease, kidney disease, or diabetes. She just had blood work done 3 days ago to check her thyroid levels as she has recently gained weight. Medical History:    Patient's medications, allergies, past medical, surgical, social and family histories were reviewed and updated as appropriate. Past Medical History:   Diagnosis Date    Difficult intubation     states she's been told twice this.     Endometriosis     Fibroid tumor     Nausea & vomiting     PONV (postoperative Hospital Problems  Date Reviewed: 4/5/2023            Codes Class Noted POA    Pancreas hemorrhage ICD-10-CM: K86.89  ICD-9-CM: 577.8  4/16/2023 Unknown         Social Determinants of Health     Tobacco Use: Medium Risk    Smoking Tobacco Use: Former    Smokeless Tobacco Use: Never    Passive Exposure: Not on file   Alcohol Use: Not on file   Financial Resource Strain: Not on file   Food Insecurity: Not on file   Transportation Needs: Not on file   Physical Activity: Not on file   Stress: Not on file   Social Connections: Not on file   Intimate Partner Violence: Not on file   Depression: Not at risk    PHQ-2 Score: 0   Housing Stability: Not on file       Review of Systems:   A comprehensive review of systems was negative except for that written in the HPI. Vital Signs:    Last 24hrs VS reviewed since prior progress note. Most recent are:  Visit Vitals  /68 (BP 1 Location: Left arm, BP Patient Position: At rest)   Pulse 86   Temp 98.3 °F (36.8 °C)   Resp 18   Ht 5' 11\" (1.803 m)   Wt 93.9 kg (207 lb)   SpO2 94%   BMI 28.87 kg/m²     Patient Vitals for the past 24 hrs:   Temp Pulse Resp BP SpO2   04/27/23 1500 98.3 °F (36.8 °C) 86 18 109/68 94 %   04/27/23 0820 97.8 °F (36.6 °C) 88 18 128/76 95 %   04/27/23 0235 98.3 °F (36.8 °C) 80 16 110/69 94 %   04/26/23 2104 98.3 °F (36.8 °C) 98 18 110/64 96 %             Intake/Output Summary (Last 24 hours) at 4/27/2023 1745  Last data filed at 4/27/2023 1411  Gross per 24 hour   Intake 10 ml   Output 140 ml   Net -130 ml          Physical Examination:     I had a face to face encounter with this patient and independently examined them on 4/27/2023 as outlined below:          PHYSICAL EXAM:   General: NAD. A&O x3. No pain. HEENT: anicteric sclerae. Mucous membranes moist.  Neck: Supple, no JVD, no meningeal signs  Chest: No respiratory distress. No wheezing, rhonchi, rales. CVS: RRR. No murmurs/rubs/gallops. No chest pain.   Abd: Soft, drain coming out of abdomen. Ext: No clubbing, no cyanosis, no edema  Neuro/Psych: Moves all fours. Strength 5/5 throughout. No appreciable focal signs grossly. Data Review:    Review and/or order of clinical lab test  Review and/or order of tests in the radiology section of CPT  Review and/or order of tests in the medicine section of CPT      I have personally and independently reviewed all pertinent labs, diagnostic studies, imaging, and have provided independent interpretation of the same. Labs:     Recent Labs     04/26/23 2350 04/25/23 2337   WBC 5.7 7.5   HGB 8.5* 8.8*   HCT 26.9* 29.0*    304       Recent Labs     04/26/23 2350 04/25/23 2337 04/25/23  0008    132* 135*   K 3.5 4.0 3.9    100 102   CO2 29 28 29   BUN 10 8 8   CREA 0.90 1.02 1.06   * 122* 109*   CA 8.4* 8.8 9.1       Recent Labs     04/26/23 2350 04/25/23 2337 04/25/23  0008   ALT 23 24 23   AP 69 81 74   TBILI 0.4 0.6 0.6   TP 6.7 7.6 7.8   ALB 2.4* 2.7* 2.8*   GLOB 4.3* 4.9* 5.0*   LPSE 80 168 178       No results for input(s): INR, PTP, APTT, INREXT, INREXT in the last 72 hours. No results for input(s): FE, TIBC, PSAT, FERR in the last 72 hours. No results found for: FOL, RBCF   No results for input(s): PH, PCO2, PO2 in the last 72 hours. No results for input(s): CPK, CKNDX, TROIQ in the last 72 hours.     No lab exists for component: CPKMB  Lab Results   Component Value Date/Time    Triglyceride 78 04/20/2023 12:05 PM     Lab Results   Component Value Date/Time    Glucose (POC) 131 (H) 04/26/2023 07:19 AM    Glucose (POC) 106 04/21/2023 06:37 AM     Lab Results   Component Value Date/Time    Color DARK YELLOW 04/16/2023 08:47 AM    Appearance CLEAR 04/16/2023 08:47 AM    Specific gravity 1.011 04/03/2023 12:32 PM    pH (UA) 5.5 04/16/2023 08:47 AM    Protein 100 (A) 04/16/2023 08:47 AM    Glucose Negative 04/16/2023 08:47 AM    Ketone TRACE (A) 04/16/2023 08:47 AM    Bilirubin Negative 04/03/2023 12:32 PM spray by Nasal route as needed for Rhinitis      docusate sodium (COLACE) 100 MG capsule Take 1 capsule by mouth 2 times daily 60 capsule 1    ibuprofen (ADVIL;MOTRIN) 600 MG tablet Take 1 tablet by mouth every 6 hours as needed for Pain 30 tablet 1     No current facility-administered medications on file prior to visit. Review of Systems  A 14 point review of systems was completed by the patient on 5/8/2019 and is available in the media section of the scanned medical record and was reviewed on 5/8/2019. The review is negative with the exception of those things mentioned in the HPI and Past Medical History    Vital Signs:  Vitals:    05/06/19 1342   BP: 121/77   Pulse: 59       General Exam:   Constitutional: Patient is adequately groomed with no evidence of malnutrition  DTRs: Deep tendon reflexes are intact  Mental Status: The patient is oriented to time, place and person. The patient's mood and affect are appropriate. Lymphatic: The lymphatic examination bilaterally reveals all areas to be without enlargement or induration. Vascular: Examination reveals no swelling or calf tenderness. Peripheral pulses are palpable and 2+. Neurological: The patient has good coordination. There is no weakness or sensory deficit. Right Shoulder Examination:    Inspection: No erythema, ecchymosis, or effusion. No gross deformities or signs of infection. Palpation:  Mild tenderness to palpation of biceps tendon and capsular tightness of the rotator cuff footprint. No crepitus. Active Range of Motion: Forward elevation to 160, abduction to 160, external rotation to 50, internal rotation thumb to T10 vs. T6 on the left. In supine position her internal rotation with her arm at 90 degrees abduction was at 20-30 vs, 60 on the left. Passive Range of Motion: Forward elevation and abduction can be further increased to 180. Strength: 5/5 internal and external rotation against resistance with elbows at side. 5/5 supraspinatus, 5/5 infraspinatus. Special Tests: Positive speed's. Negative Maurita Cooks, Neer. Neurovascular: Radial pulse 2+. No motor deficits and sensation to light touch intact. Comparison Left Shoulder Examination:    Inspection: No erythema, ecchymosis, or effusion. No gross deformities or signs of infection. Palpation:  No tenderness to palpation. Active Range of Motion: Full active range of motion with internal rotation thumb to T6. Strength: Full strength against resistance. Neurovascular: Radial pulse 2+. No motor deficits and sensation to light touch intact. Radiology:     Plain radiographs of the right shoulder, comprising two views: True AP and axillary lateral were obtained and reviewed in the office: Radiographs showed good glenohumeral joint space without gross deformities, fractures, subluxations, or dislocations. Impression: No gross deformities, fractures, or signs of acute or chronic pathology. Assessment : This is a 47 y/o female with a three year history right shoulder tightness with posterior capsular tightness and biceps tendonitis. Impression:  Encounter Diagnoses   Name Primary?  Biceps tendonitis on right Yes    Shoulder stiffness, right     Right shoulder pain, unspecified chronicity        Office Procedures:  Orders Placed This Encounter   Procedures    XR SHOULDER RIGHT (MIN 2 VIEWS)     Order Specific Question:   Reason for exam:     Answer:   Pain RM 1    PA ARTHROCENTESIS ASPIR&/INJ MAJOR JT/BURSA W/O US    PA METHYLPREDNISOLONE 40 MG INJ     Depo-Medrol 40mg with 1% Lidocaine       Treatment Plan: I would not say she has a full on frozen shoulder however the patient does present with posterior capsular tightness and stiffness of the right shoulder especially with internal rotation stemming from a strain to the biceps tendon while she was moving her furniture causing inflammation.   She has not yet tried a course of a targeted Urobilinogen 1.0 04/16/2023 08:47 AM    Nitrites Negative 04/16/2023 08:47 AM    Leukocyte Esterase Negative 04/16/2023 08:47 AM    Epithelial cells FEW 04/16/2023 08:47 AM    Bacteria Negative 04/16/2023 08:47 AM    WBC 0-4 04/16/2023 08:47 AM    RBC 0-5 04/16/2023 08:47 AM       Notes reviewed from all clinical/nonclinical/nursing services involved in patient's clinical care. Care coordination discussions were held with appropriate clinical/nonclinical/ nursing providers based on care coordination needs. Patients current active Medications were reviewed, considered, added and adjusted based on the clinical condition today. Home Medications were reconciled to the best of my ability given all available resources at the time of admission. Route is PO if not otherwise noted.       Admission Status:[id number]:::1}      Medications Reviewed:     Current Facility-Administered Medications   Medication Dose Route Frequency    HYDROmorphone (DILAUDID) tablet 2 mg  2 mg Oral Q4H PRN    meropenem (MERREM) 1 g in 0.9% sodium chloride (MBP/ADV) 50 mL MBP  1 g IntraVENous Q8H    [Held by provider] HYDROmorphone (DILAUDID) injection 2 mg  2 mg IntraVENous Q3H PRN    ondansetron (ZOFRAN ODT) tablet 4 mg  4 mg Oral Q8H PRN    Or    ondansetron (ZOFRAN) injection 4 mg  4 mg IntraVENous Q4H PRN    senna-docusate (PERICOLACE) 8.6-50 mg per tablet 1 Tablet  1 Tablet Oral DAILY    sodium chloride (NS) flush 5-40 mL  5-40 mL IntraVENous PRN    acetaminophen (TYLENOL) tablet 1,000 mg  1,000 mg Oral Q6H PRN    sodium chloride (NS) flush 5-40 mL  5-40 mL IntraVENous Q8H    sodium chloride (NS) flush 5-40 mL  5-40 mL IntraVENous PRN    polyethylene glycol (MIRALAX) packet 17 g  17 g Oral DAILY PRN    pantoprazole (PROTONIX) tablet 20 mg  20 mg Oral ACB    mesalamine (DELZICOL) DR capsule 800 mg  800 mg Oral TID    lactated Ringers infusion  75 mL/hr IntraVENous CONTINUOUS    prochlorperazine (COMPAZINE) injection 10 mg  10 mg IntraVENous Q6H PRN     ______________________________________________________________________  EXPECTED LENGTH OF STAY: 4d 16h  ACTUAL LENGTH OF STAY:          [address] Jewel Ahumada MD physical therapy and feel that she would benefit from that. The orders were written to help with mobility of the right shoulder to focus on stretches. Patient was also given a cortisone injection to help with pain and inflammation. Risks and benefits of a corticosteroid injection were discussed. 80 milligrams of Depo Medrol and 8 CC of 1% lidocaine were injected in the left glenohumeral joint following chlorhexidine prep. She tolerated the procedure well with no immediate adverse sequelae after the injection. Patient will come back to our office for re-evaluation in 4 weeks, and if she continues to have symptoms that are getting worse we may consider an MRI. All questions were answered and the patient was agreeable to this plan. 9:49 AM      Dionne Adkins PA-C  Orthopaedic Sports Medicine Physician Assistant      This dictation was performed with a verbal recognition program [facility]) and it was checked for errors. It is possible that there are still dictated errors within this office note. If so, please bring any errors to my attention for an addendum. All efforts were made to ensure that this office note is accurate.

## 2023-05-04 ENCOUNTER — OFFICE VISIT (OUTPATIENT)
Dept: PULMONOLOGY | Age: 58
End: 2023-05-04
Payer: COMMERCIAL

## 2023-05-04 VITALS
HEART RATE: 67 BPM | RESPIRATION RATE: 16 BRPM | BODY MASS INDEX: 31.98 KG/M2 | OXYGEN SATURATION: 97 % | DIASTOLIC BLOOD PRESSURE: 64 MMHG | HEIGHT: 66 IN | SYSTOLIC BLOOD PRESSURE: 100 MMHG | WEIGHT: 199 LBS | TEMPERATURE: 98 F

## 2023-05-04 DIAGNOSIS — K76.0 FATTY INFILTRATION OF LIVER: ICD-10-CM

## 2023-05-04 DIAGNOSIS — G47.33 OSA (OBSTRUCTIVE SLEEP APNEA): ICD-10-CM

## 2023-05-04 DIAGNOSIS — J31.0 CHRONIC RHINITIS: ICD-10-CM

## 2023-05-04 DIAGNOSIS — R76.8 ANTI-TPO ANTIBODIES PRESENT: ICD-10-CM

## 2023-05-04 DIAGNOSIS — E66.9 CLASS 1 OBESITY WITH BODY MASS INDEX (BMI) OF 32.0 TO 32.9 IN ADULT, UNSPECIFIED OBESITY TYPE, UNSPECIFIED WHETHER SERIOUS COMORBIDITY PRESENT: Primary | ICD-10-CM

## 2023-05-04 PROCEDURE — 99214 OFFICE O/P EST MOD 30 MIN: CPT | Performed by: INTERNAL MEDICINE

## 2023-05-04 ASSESSMENT — SLEEP AND FATIGUE QUESTIONNAIRES
HOW LIKELY ARE YOU TO NOD OFF OR FALL ASLEEP WHILE WATCHING TV: 1
HOW LIKELY ARE YOU TO NOD OFF OR FALL ASLEEP IN A CAR, WHILE STOPPED FOR A FEW MINUTES IN TRAFFIC: 0
HOW LIKELY ARE YOU TO NOD OFF OR FALL ASLEEP WHILE SITTING AND TALKING TO SOMEONE: 0
HOW LIKELY ARE YOU TO NOD OFF OR FALL ASLEEP WHILE LYING DOWN TO REST IN THE AFTERNOON WHEN CIRCUMSTANCES PERMIT: 0
HOW LIKELY ARE YOU TO NOD OFF OR FALL ASLEEP WHILE SITTING QUIETLY AFTER LUNCH WITHOUT ALCOHOL: 0
HOW LIKELY ARE YOU TO NOD OFF OR FALL ASLEEP WHILE SITTING AND READING: 1
HOW LIKELY ARE YOU TO NOD OFF OR FALL ASLEEP WHILE SITTING INACTIVE IN A PUBLIC PLACE: 0
HOW LIKELY ARE YOU TO NOD OFF OR FALL ASLEEP WHEN YOU ARE A PASSENGER IN A CAR FOR AN HOUR WITHOUT A BREAK: 0
ESS TOTAL SCORE: 2

## 2023-05-04 NOTE — PROGRESS NOTES
MA Communication:   The following orders are received by verbal communication from Sherie Thomas MD    Orders include:    PRN

## 2023-05-04 NOTE — PROGRESS NOTES
Chief Complaint/Referring Provider:  Pulmonary follow up and to discuss the compliance    Patient has come to the office for a pulm evaluation to discuss the clinical status and the compliance data, patient states that she does have chronic rhinitis and patient takes Allegra on a regular basis, patient on questioning further states that she has been using her CPAP machine on a regular basis but patient states that in the daytime also she has increased yawning at work, patient does take trazodone on a regular basis, patient states that she tries to take the trazodone earlier so that when she has the bed she does not have to wait to sleep, patient does not have any significant cough expectorant shortness of breath or wheezing, no chest pain or palpitations, no hematochezia or melena, patient does not have any increasing leg edema, on evaluation of patient's labs patient has elevated thyroid peroxidase antibody along with that patient also has increased antithyroglobulin antibodies present patient had gone to a provider who told her that she has a leaky gut syndrome which is causing that to happen and patient never went to see a endocrinologist, patient does not have any other medications which are new, no other pertinent review of system of concern        Previous HPI:Patient is a 53-year-old female who has come to the office for a pulmonary follow-up, patient has been having increased leg edema which is concerning to her, patient has seen a liver doctor or urologist and her PCP and patient has undergone various tests including imaging of the liver along with echocardiogram without any significant improvement in the leg swelling per se, patient has history of total abdominal hysterectomy with bilateral salpingo-oophorectomy for endometriosis in the past but patient does not know whether any lymph nodes were removed at that time or not, patient does not have any significant cough expectoration shortness of breath or

## 2023-05-15 ENCOUNTER — TELEPHONE (OUTPATIENT)
Dept: FAMILY MEDICINE CLINIC | Age: 58
End: 2023-05-15

## 2023-05-15 DIAGNOSIS — R93.89 ABNORMAL THYROID ULTRASOUND: Primary | ICD-10-CM

## 2023-05-15 NOTE — TELEPHONE ENCOUNTER
Patient says she was pulmonologist, Dr Saniya Shah, on 5-4-23 and he recommended her PCP refer her to a thyroid specialist. Will need to see a [facility] provider.

## 2023-07-28 ENCOUNTER — OFFICE VISIT (OUTPATIENT)
Dept: ENDOCRINOLOGY | Age: 58
End: 2023-07-28
Payer: COMMERCIAL

## 2023-07-28 VITALS
SYSTOLIC BLOOD PRESSURE: 103 MMHG | WEIGHT: 201.8 LBS | BODY MASS INDEX: 32.43 KG/M2 | DIASTOLIC BLOOD PRESSURE: 67 MMHG | OXYGEN SATURATION: 99 % | HEIGHT: 66 IN | HEART RATE: 60 BPM

## 2023-07-28 DIAGNOSIS — E53.8 B12 DEFICIENCY: ICD-10-CM

## 2023-07-28 DIAGNOSIS — R76.8 ANTI-TPO ANTIBODIES PRESENT: Primary | ICD-10-CM

## 2023-07-28 DIAGNOSIS — R76.8 ANTI-TPO ANTIBODIES PRESENT: ICD-10-CM

## 2023-07-28 DIAGNOSIS — E55.9 VITAMIN D DEFICIENCY: ICD-10-CM

## 2023-07-28 DIAGNOSIS — E06.3 HASHIMOTO'S DISEASE: ICD-10-CM

## 2023-07-28 DIAGNOSIS — E04.9 GOITER: ICD-10-CM

## 2023-07-28 PROBLEM — F33.9 MAJOR DEPRESSIVE DISORDER, RECURRENT, UNSPECIFIED (HCC): Status: ACTIVE | Noted: 2023-07-28

## 2023-07-28 PROBLEM — F33.1 MAJOR DEPRESSIVE DISORDER, RECURRENT, MODERATE (HCC): Status: ACTIVE | Noted: 2023-07-28

## 2023-07-28 PROBLEM — F33.0 MAJOR DEPRESSIVE DISORDER, RECURRENT, MILD (HCC): Status: ACTIVE | Noted: 2023-07-28

## 2023-07-28 PROCEDURE — 99204 OFFICE O/P NEW MOD 45 MIN: CPT | Performed by: INTERNAL MEDICINE

## 2023-07-28 RX ORDER — ESTRADIOL 1 MG/1
TABLET ORAL
COMMUNITY
Start: 2023-06-28

## 2023-07-28 NOTE — PROGRESS NOTES
Patient ID: Caryle Ferrari is a 66 y.o. female      Chief Complaint: heterogeneous thyroid gland, hashimoto's disease     HPI:   Caryle Ferrari is here for initial evaluation of heterogeneous thyroid gland, , hashimoto's disease     She had severe COVID in 2020. Was hospitalized and had double pneumonia. Aug 2022: [address] showed heterogeneously enlarged thyroid gland showing auto immune thyroid disease     TPO antibodies high in Aug 2022, Sep 2022     Saw Dr. Ava Riggs and was told to have leaky gut syndrome     Takes Levothyroxine 0 mcg daily in morning empty stomach with water and waits for minutes. Energy levels are low. Sleep apnea has been resolved with CPAP   Weight is gradually going up   Usually cold    No changes in hair or skin   Has depression / anxiety . Son is deployed out of country  Has hot flashes and some excessive sweating, takes estradiol    Denies tremors, palpitations   She feels choking. S showed slightly enlarged thyroid gland. CT chest did not show issues due to thyroid. STUART at age 48     Family history of thyroid disorder: None     No neck radiation  Recent iodine loading in form of contrast material for diagnostic studies/cardiac cath  No Food supplements, herbs or medications including Biotin  Has severe alelrgies.  No Recent URTI    H/o B12 def   Off replacement       The following portions of the patient's history were reviewed and updated as appropriate:     Family History   Problem Relation Age of Onset    High Cholesterol Mother     Diabetes Father     Kidney Disease Father     High Cholesterol Father     Breast Cancer Maternal Aunt     Breast Cancer Paternal Cousin           Social History     Socioeconomic History    Marital status:      Spouse name: Not on file    Number of children: Not on file    Years of education: Not on file    Highest education level: Not on file   Occupational History    Not on file   Tobacco Use    Smoking status: Never    Smokeless

## 2023-07-29 LAB
25(OH)D3 SERPL-MCNC: 47.7 NG/ML
T3FREE SERPL-MCNC: 2.9 PG/ML (ref 2.3–4.2)
T4 FREE SERPL-MCNC: 1.1 NG/DL (ref 0.9–1.8)
TSH SERPL DL<=0.005 MIU/L-ACNC: 2.64 UIU/ML (ref 0.27–4.2)
VIT B12 SERPL-MCNC: 362 PG/ML (ref 211–911)

## 2023-08-02 LAB
CHOLEST SERPL-MCNC: 165 MG/DL (ref 0–199)
GLUCOSE SERPL-MCNC: 83 MG/DL (ref 70–99)
HDLC SERPL-MCNC: 38 MG/DL (ref 40–60)
LDLC SERPL CALC-MCNC: 100 MG/DL
TRIGL SERPL-MCNC: 137 MG/DL (ref 0–150)

## 2023-11-03 ENCOUNTER — TELEMEDICINE (OUTPATIENT)
Dept: ENDOCRINOLOGY | Age: 58
End: 2023-11-03
Payer: COMMERCIAL

## 2023-11-03 ENCOUNTER — HOSPITAL ENCOUNTER (OUTPATIENT)
Age: 58
Discharge: HOME | End: 2023-11-03
Payer: COMMERCIAL

## 2023-11-03 DIAGNOSIS — R76.8 ANTI-TPO ANTIBODIES PRESENT: Primary | ICD-10-CM

## 2023-11-03 DIAGNOSIS — E55.9 VITAMIN D DEFICIENCY: ICD-10-CM

## 2023-11-03 DIAGNOSIS — E53.8 B12 DEFICIENCY: ICD-10-CM

## 2023-11-03 DIAGNOSIS — R76.8 ANTI-TPO ANTIBODIES PRESENT: ICD-10-CM

## 2023-11-03 LAB
T4 FREE SERPL-MCNC: 1 NG/DL (ref 0.9–1.8)
TSH SERPL DL<=0.005 MIU/L-ACNC: 1.69 UIU/ML (ref 0.27–4.2)

## 2023-11-03 PROCEDURE — 36415 COLL VENOUS BLD VENIPUNCTURE: CPT

## 2023-11-03 PROCEDURE — 99214 OFFICE O/P EST MOD 30 MIN: CPT | Performed by: INTERNAL MEDICINE

## 2023-11-03 PROCEDURE — 84443 ASSAY THYROID STIM HORMONE: CPT

## 2023-11-03 PROCEDURE — 84439 ASSAY OF FREE THYROXINE: CPT

## 2023-11-03 NOTE — PROGRESS NOTES
Patient ID: Shelly Menjivar is a 66 y.o. female      Chief Complaint: heterogeneous thyroid gland, hashimoto's disease      Fernanda Swann, was evaluated through a synchronous (real-time) audio-video encounter. The patient (or guardian if applicable) is aware that this is a billable service, which includes applicable co-pays. This Virtual Visit was conducted with patient's (and/or legal guardian's) consent. Patient identification was verified, and a caregiver was present when appropriate. The patient was located at Home: [facility] [place] [address]  Provider was located at [facility] ([address]): [address]  [address] [facility],  [address]        HPI:   Shelly Menjivar is here for an evaluation of heterogeneous thyroid gland, , hashimoto's disease     She had severe COVID in 2020. Was hospitalized and had double pneumonia. Aug 2022: [address] showed heterogeneously enlarged thyroid gland showing auto immune thyroid disease     TPO antibodies high in Aug 2022, Sep 2022     Saw Dr. Rachel Lucero and was told to have leaky gut syndrome     Takes Levothyroxine 0 mcg daily in morning empty stomach with water and waits for minutes. Energy levels are better. Walking 2 miles per day. She has cut down sugar. No sodas now    Sleep apnea has been resolved with CPAP   Weight is down 10 lbs. Feels body swelling and inflammation is down. Usually cold  No changes in hair or skin   Has depression / anxiety . Son is in [place]. May get deployed to [address] [place] [address]   Taking estradiol    Denies tremors, palpitations   She feels choking. US showed slightly enlarged thyroid gland. CT chest did not show issues due to thyroid. STUART at age 48     Family history of thyroid disorder: None     No neck radiation  Recent iodine loading in form of contrast material for diagnostic studies/cardiac cath  No Food supplements, herbs or medications including Biotin  Has severe alelrgies.  No Recent URTI    H/o B12 def   Off

## 2023-11-30 DIAGNOSIS — F51.01 PRIMARY INSOMNIA: ICD-10-CM

## 2023-12-01 RX ORDER — TRAZODONE HYDROCHLORIDE 50 MG/1
50 TABLET ORAL NIGHTLY PRN
Qty: 90 TABLET | Refills: 1 | Status: SHIPPED | OUTPATIENT
Start: 2023-12-01

## 2024-01-16 ENCOUNTER — TELEPHONE (OUTPATIENT)
Dept: ENDOCRINOLOGY | Age: 59
End: 2024-01-16

## 2024-01-16 NOTE — TELEPHONE ENCOUNTER
Pt called stating that her labs were billed incorrectly due to the lab/insurance not having the a Dx code for the labs that were drawn     Pt stated that billing stated that they cannot help and that she needed to contact our office     Please advise   CB# [phone]

## 2024-01-17 NOTE — TELEPHONE ENCOUNTER
Mrs. Parham informed per Dr. Lozano of codes used.  She will check with her plan as codes filed typically are covered by most plans.

## 2024-01-17 NOTE — TELEPHONE ENCOUNTER
Which labs ?   For thyroid labs I attached Goiter, hashimotos codes, insurance should not refuse.

## 2024-03-04 ENCOUNTER — HOSPITAL ENCOUNTER (OUTPATIENT)
Dept: WOMENS IMAGING | Age: 59
Discharge: HOME | End: 2024-03-04
Payer: COMMERCIAL

## 2024-03-04 VITALS — HEIGHT: 66 IN | BODY MASS INDEX: 31.34 KG/M2 | WEIGHT: 195 LBS

## 2024-03-04 DIAGNOSIS — Z12.31 ENCOUNTER FOR SCREENING MAMMOGRAM FOR MALIGNANT NEOPLASM OF BREAST: ICD-10-CM

## 2024-03-04 PROCEDURE — 77063 BREAST TOMOSYNTHESIS BI: CPT

## 2024-04-23 ENCOUNTER — HOSPITAL ENCOUNTER (OUTPATIENT)
Age: 59
Discharge: HOME | End: 2024-04-23
Payer: COMMERCIAL

## 2024-04-23 DIAGNOSIS — E06.3 HASHIMOTO'S DISEASE: ICD-10-CM

## 2024-04-23 DIAGNOSIS — E04.9 GOITER: ICD-10-CM

## 2024-04-23 DIAGNOSIS — R76.8 ANTI-TPO ANTIBODIES PRESENT: ICD-10-CM

## 2024-04-23 LAB
PLATELET # BLD AUTO: 212 K/UL (ref 135–450)
T4 FREE SERPL-MCNC: 1.2 NG/DL (ref 0.9–1.8)
TSH SERPL DL<=0.005 MIU/L-ACNC: 1.92 UIU/ML (ref 0.27–4.2)

## 2024-04-23 PROCEDURE — 84450 TRANSFERASE (AST) (SGOT): CPT

## 2024-04-23 PROCEDURE — 82728 ASSAY OF FERRITIN: CPT

## 2024-04-23 PROCEDURE — 36415 COLL VENOUS BLD VENIPUNCTURE: CPT

## 2024-04-23 PROCEDURE — 84443 ASSAY THYROID STIM HORMONE: CPT

## 2024-04-23 PROCEDURE — 84460 ALANINE AMINO (ALT) (SGPT): CPT

## 2024-04-23 PROCEDURE — 84439 ASSAY OF FREE THYROXINE: CPT

## 2024-04-28 LAB — MISCELLANEOUS LAB TEST ORDER: NORMAL

## 2024-05-30 ENCOUNTER — ANESTHESIA EVENT (OUTPATIENT)
Dept: ENDOSCOPY | Age: 59
End: 2024-05-30
Payer: COMMERCIAL

## 2024-05-31 ENCOUNTER — HOSPITAL ENCOUNTER (OUTPATIENT)
Age: 59
Setting detail: OUTPATIENT SURGERY
Discharge: HOME | End: 2024-05-31
Attending: INTERNAL MEDICINE | Admitting: INTERNAL MEDICINE
Payer: COMMERCIAL

## 2024-05-31 ENCOUNTER — ANESTHESIA (OUTPATIENT)
Dept: ENDOSCOPY | Age: 59
End: 2024-05-31
Payer: COMMERCIAL

## 2024-05-31 VITALS
OXYGEN SATURATION: 99 % | HEIGHT: 66 IN | WEIGHT: 195 LBS | RESPIRATION RATE: 16 BRPM | HEART RATE: 61 BPM | SYSTOLIC BLOOD PRESSURE: 99 MMHG | DIASTOLIC BLOOD PRESSURE: 73 MMHG | TEMPERATURE: 98 F | BODY MASS INDEX: 31.34 KG/M2

## 2024-05-31 DIAGNOSIS — R10.13 EPIGASTRIC PAIN: ICD-10-CM

## 2024-05-31 DIAGNOSIS — R68.81 EARLY SATIETY: ICD-10-CM

## 2024-05-31 DIAGNOSIS — K21.9 GASTROESOPHAGEAL REFLUX DISEASE, UNSPECIFIED WHETHER ESOPHAGITIS PRESENT: ICD-10-CM

## 2024-05-31 DIAGNOSIS — Z86.010 PERSONAL HISTORY OF COLONIC POLYPS: ICD-10-CM

## 2024-05-31 PROCEDURE — 2580000003 HC RX 258: Performed by: ANESTHESIOLOGY

## 2024-05-31 PROCEDURE — 3700000000 HC ANESTHESIA ATTENDED CARE: Performed by: INTERNAL MEDICINE

## 2024-05-31 PROCEDURE — 88305 TISSUE EXAM BY PATHOLOGIST: CPT

## 2024-05-31 PROCEDURE — 3609012400 HC EGD TRANSORAL BIOPSY SINGLE/MULTIPLE: Performed by: INTERNAL MEDICINE

## 2024-05-31 PROCEDURE — 3700000001 HC ADD 15 MINUTES (ANESTHESIA): Performed by: INTERNAL MEDICINE

## 2024-05-31 PROCEDURE — 7100000011 HC PHASE II RECOVERY - ADDTL 15 MIN: Performed by: INTERNAL MEDICINE

## 2024-05-31 PROCEDURE — 6360000002 HC RX W HCPCS

## 2024-05-31 PROCEDURE — 2709999900 HC NON-CHARGEABLE SUPPLY: Performed by: INTERNAL MEDICINE

## 2024-05-31 PROCEDURE — 7100000010 HC PHASE II RECOVERY - FIRST 15 MIN: Performed by: INTERNAL MEDICINE

## 2024-05-31 PROCEDURE — 3609010600 HC COLONOSCOPY POLYPECTOMY SNARE/COLD BIOPSY: Performed by: INTERNAL MEDICINE

## 2024-05-31 PROCEDURE — 2500000003 HC RX 250 WO HCPCS

## 2024-05-31 RX ORDER — GLYCOPYRROLATE 0.2 MG/ML
INJECTION INTRAMUSCULAR; INTRAVENOUS PRN
Status: DISCONTINUED | OUTPATIENT
Start: 2024-05-31 | End: 2024-05-31 | Stop reason: SDUPTHER

## 2024-05-31 RX ORDER — PROPOFOL 10 MG/ML
INJECTION, EMULSION INTRAVENOUS CONTINUOUS PRN
Status: DISCONTINUED | OUTPATIENT
Start: 2024-05-31 | End: 2024-05-31 | Stop reason: SDUPTHER

## 2024-05-31 RX ORDER — PANTOPRAZOLE SODIUM 40 MG/1
40 TABLET, DELAYED RELEASE ORAL
Qty: 90 TABLET | Refills: 3 | Status: SHIPPED | OUTPATIENT
Start: 2024-05-31

## 2024-05-31 RX ORDER — SODIUM CHLORIDE 0.9 % (FLUSH) 0.9 %
5-40 SYRINGE (ML) INJECTION EVERY 12 HOURS SCHEDULED
Status: DISCONTINUED | OUTPATIENT
Start: 2024-05-31 | End: 2024-05-31 | Stop reason: HOSPADM

## 2024-05-31 RX ORDER — LIDOCAINE HYDROCHLORIDE 10 MG/ML
1 INJECTION, SOLUTION EPIDURAL; INFILTRATION; INTRACAUDAL; PERINEURAL
Status: DISCONTINUED | OUTPATIENT
Start: 2024-05-31 | End: 2024-05-31 | Stop reason: HOSPADM

## 2024-05-31 RX ORDER — LIDOCAINE HYDROCHLORIDE 20 MG/ML
INJECTION, SOLUTION INFILTRATION; PERINEURAL PRN
Status: DISCONTINUED | OUTPATIENT
Start: 2024-05-31 | End: 2024-05-31 | Stop reason: SDUPTHER

## 2024-05-31 RX ORDER — SODIUM CHLORIDE 0.9 % (FLUSH) 0.9 %
5-40 SYRINGE (ML) INJECTION PRN
Status: DISCONTINUED | OUTPATIENT
Start: 2024-05-31 | End: 2024-05-31 | Stop reason: HOSPADM

## 2024-05-31 RX ORDER — LINACLOTIDE 290 UG/1
290 CAPSULE, GELATIN COATED ORAL
COMMUNITY

## 2024-05-31 RX ORDER — SODIUM CHLORIDE, SODIUM LACTATE, POTASSIUM CHLORIDE, CALCIUM CHLORIDE 600; 310; 30; 20 MG/100ML; MG/100ML; MG/100ML; MG/100ML
INJECTION, SOLUTION INTRAVENOUS CONTINUOUS
Status: DISCONTINUED | OUTPATIENT
Start: 2024-05-31 | End: 2024-05-31 | Stop reason: HOSPADM

## 2024-05-31 RX ADMIN — GLYCOPYRROLATE 0.2 MG: 0.2 INJECTION INTRAMUSCULAR; INTRAVENOUS at 12:05

## 2024-05-31 RX ADMIN — LIDOCAINE HYDROCHLORIDE 100 MG: 20 INJECTION, SOLUTION INFILTRATION; PERINEURAL at 12:05

## 2024-05-31 RX ADMIN — PROPOFOL 100 MG: 10 INJECTION, EMULSION INTRAVENOUS at 12:05

## 2024-05-31 RX ADMIN — SODIUM CHLORIDE, POTASSIUM CHLORIDE, SODIUM LACTATE AND CALCIUM CHLORIDE: 600; 310; 30; 20 INJECTION, SOLUTION INTRAVENOUS at 11:30

## 2024-05-31 RX ADMIN — PROPOFOL 150 MCG/KG/MIN: 10 INJECTION, EMULSION INTRAVENOUS at 12:04

## 2024-05-31 ASSESSMENT — PAIN - FUNCTIONAL ASSESSMENT: PAIN_FUNCTIONAL_ASSESSMENT: 0-10

## 2024-05-31 ASSESSMENT — PAIN SCALES - GENERAL
PAINLEVEL_OUTOF10: 0
PAINLEVEL_OUTOF10: 0

## 2024-05-31 ASSESSMENT — LIFESTYLE VARIABLES: SMOKING_STATUS: 0

## 2024-05-31 NOTE — PROGRESS NOTES
Ambulatory Surgery/Procedure Discharge Note    Vitals:    05/31/24 1247   BP: 99/73   Pulse: 54   Resp: 16   Temp:    SpO2: 100%       In: 600 [I.V.:600]  Out: -     Restroom use offered before discharge.  Yes    Pain assessment:  level of pain (1-10, 10 severe),   Pain Level: 0    Pt and S.O./family states \"ready to go home\". Pt alert and oriented x4. IV removed. Denies N/V or pain. Pt tolerating po intake. Discharge instructions given to pt and friend with pt permission. Pt and friend verbalized understanding of all instructions. Left with all belongings,  and discharge instructions.     Patient discharged to home/self care. Patient discharged via wheel chair by transporter to waiting family/S.O.       5/31/2024 12:49 PM

## 2024-05-31 NOTE — H&P
Gastroenterology Note                 Pre-operative History and Physical    Patient: Fernanda Parham  : 1965  CSN:     History Obtained From:   Patient or guardian.      HISTORY OF PRESENT ILLNESS:    The patient is a 58 y.o. female here for EGD/colon for GERD, epigastric abdominal pain, Phx advanced colon polyps    Past Medical History:    Past Medical History:   Diagnosis Date    Class 1 obesity in adult 10/29/2021    COVID-19 2020    Difficult intubation     states she's been told twice this.    Endometriosis     Fatty infiltration of liver 10/29/2021    Fibroid tumor     Goiter 2023    Hashimoto's disease     Major depressive disorder, recurrent, mild 2023    Nausea & vomiting     Nephrolithiasis 10/29/2021    PONV (postoperative nausea and vomiting)     Sleep apnea     wears CPAP    TMJ disease      Past Surgical History:    Past Surgical History:   Procedure Laterality Date     SECTION      CHOLECYSTECTOMY  2013    laparoscopic    COLONOSCOPY  2021    COLONOSCOPY POLYPECTOMY SNARE/COLD BIOPSY performed by Carlos Stock MD at [facility] ENDOSCOPY    COLONOSCOPY  2021    COLONOSCOPY POLYPECTOMY ABLATION performed by Carlos Stock MD at [facility] ENDOSCOPY    CYSTOSCOPY      DILATION AND CURETTAGE OF UTERUS      HYSTERECTOMY (CERVIX STATUS UNKNOWN)      MOUTH SURGERY  2023    OTHER SURGICAL HISTORY  2018    SUCTION DILATATION AND CURETTAGE HYSTEROSCOPY,    STUART AND BSO (CERVIX REMOVED)  2018     Robotic assisted laparoscopic hysterectomy, Bilateral salpingoophorectomy, Minilaparotomy for uterine extraction      UPPER GASTROINTESTINAL ENDOSCOPY N/A 2022    EGD BIOPSY performed by Carlos Stock MD at [facility] ENDOSCOPY    UPPER GASTROINTESTINAL ENDOSCOPY N/A 10/11/2022    ESOPHAGOGASTRODUODENOSCOPY ENDOSCOPIC ULTRASOUND performed by Miranda Walker MD at [facility] ENDOSCOPY    UPPER GASTROINTESTINAL ENDOSCOPY N/A 10/11/2022    EGD BIOPSY

## 2024-05-31 NOTE — ANESTHESIA PRE PROCEDURE
• Fatty infiltration of liver K76.0   • Nephrolithiasis N20.0   • Chronic rhinitis J31.0   • Bilateral leg edema R60.0   • Hepatic cyst K76.89   • Anti-TPO antibodies present R76.8   • Major depressive disorder, recurrent, mild F33.0   • Major depressive disorder, recurrent, moderate F33.1   • Major depressive disorder, recurrent, unspecified F33.9   • B12 deficiency E53.8   • Goiter E04.9       Past Medical History:        Diagnosis Date   • Class 1 obesity in adult 10/29/2021   • COVID-19 2020   • Difficult intubation     states she's been told twice this.   • Endometriosis    • Fatty infiltration of liver 10/29/2021   • Fibroid tumor    • Goiter 2023   • Major depressive disorder, recurrent, mild 2023   • Nausea & vomiting    • Nephrolithiasis 10/29/2021   • PONV (postoperative nausea and vomiting)    • Sleep apnea     wears CPAP   • TMJ disease        Past Surgical History:        Procedure Laterality Date   •  SECTION     • CHOLECYSTECTOMY  2013    laparoscopic   • COLONOSCOPY  2021    COLONOSCOPY POLYPECTOMY SNARE/COLD BIOPSY performed by Carlos Stock MD at [facility] ENDOSCOPY   • COLONOSCOPY  2021    COLONOSCOPY POLYPECTOMY ABLATION performed by Carlos Stock MD at [facility] ENDOSCOPY   • CYSTOSCOPY     • DILATION AND CURETTAGE OF UTERUS     • HYSTERECTOMY (CERVIX STATUS UNKNOWN)     • MOUTH SURGERY  2023   • OTHER SURGICAL HISTORY  2018    SUCTION DILATATION AND CURETTAGE HYSTEROSCOPY,   • STUART AND BSO (CERVIX REMOVED)  2018     Robotic assisted laparoscopic hysterectomy, Bilateral salpingoophorectomy, Minilaparotomy for uterine extraction     • UPPER GASTROINTESTINAL ENDOSCOPY N/A 2022    EGD BIOPSY performed by Carlos Stock MD at [facility] ENDOSCOPY   • UPPER GASTROINTESTINAL ENDOSCOPY N/A 10/11/2022    ESOPHAGOGASTRODUODENOSCOPY ENDOSCOPIC ULTRASOUND performed by Miranda Walker MD at [facility] ENDOSCOPY   • UPPER GASTROINTESTINAL ENDOSCOPY N/A

## 2024-05-31 NOTE — ANESTHESIA POSTPROCEDURE EVALUATION
Department of Anesthesiology  Postprocedure Note    Patient: Fernanda Parham  MRN: [id number]  YOB: 1965  Date of evaluation: 5/31/2024    Procedure Summary       Date: 05/31/24 Room / Location: [place] [id number] / [facility]    Anesthesia Start: 1202 Anesthesia Stop: 1234    Procedures:       COLONOSCOPY POLYPECTOMY SNARE BIOPSY      ESOPHAGOGASTRODUODENOSCOPY BIOPSY Diagnosis:       Gastroesophageal reflux disease, unspecified whether esophagitis present      Personal history of colonic polyps      Early satiety      Epigastric pain      (Gastroesophageal reflux disease, unspecified whether esophagitis present [K21.9])      (Personal history of colonic polyps [Z86.010])      (Early satiety [R68.81])      (Epigastric pain [R10.13])    Surgeons: Carlos Stock MD Responsible Provider: Mateusz Cano MD    Anesthesia Type: MAC ASA Status: 2            Anesthesia Type: No value filed.    Rosie Phase I: Rosie Score: 10    Rosie Phase II:      Anesthesia Post Evaluation    Patient location during evaluation: PACU  Patient participation: complete - patient participated  Level of consciousness: awake and alert  Airway patency: patent  Nausea & Vomiting: no nausea and no vomiting  Cardiovascular status: hemodynamically stable  Respiratory status: acceptable  Hydration status: euvolemic  Multimodal analgesia pain management approach  Pain management: satisfactory to patient    No notable events documented.

## 2024-05-31 NOTE — PROCEDURES
[facility]  Colonoscopy Note    Patient: Fernanda Parham  : 1965  Acct#:     Procedure: Colonoscopy with polypectomy (cold snare)    Date:  2024    Surgeon:  CARLOS STOCK MD    Referring Physician:  Carlos Stokc MD    Anesthesia: IV propofol, per anesthesia    EBL: <50 mL    Indications: Screening for colon cancer  Personal history of advanced colon polyps    Procedure:     An informed consent was obtained from the patient after explanation of indications, benefits, possible risks and complications of the procedure.  The patient was then taken to the endoscopy suite, placed in the left lateral decubitus position, and the above IV anesthesia was administered.    A digital rectal examination was performed and revealed negative without mass, lesions or tenderness.      The Olympus PCFQ-H190 video colonoscope was placed in the patient's rectum under digital direction and advanced to the cecum. The cecum was identified by characteristic anatomy and ballottment.  The prep was adequate.      Findings:  One 6 mm sessile polyp in the ascending colon removed via cold snare polypectomy.  3 sessile polyps in the transverse colon measuring 5 to 10 mm removed via cold snare polypectomy.      The scope was then withdrawn into the rectum and retroflexed.  The retroflexed view of the anal verge and rectum demonstrates small hemorrhoids.     The scope was straightened, the colon was decompressed and the scope was withdrawn from the patient.      The patient tolerated the procedure well and was taken to the PACU in good condition.    Biopsies:  yes      Impression:   One 6 mm sessile polyp in the ascending colon removed via cold snare polypectomy.  3 sessile polyps in the transverse colon measuring 5 to 10 mm removed via cold snare polypectomy.    Recommendations:  Await pathology results    Carlos Stock MD  [facility]

## 2024-05-31 NOTE — DISCHARGE INSTRUCTIONS
ENDOSCOPY DISCHARGE INSTRUCTIONS:    Call the physician that did your procedure for any questions or concern:    [facility]: [phone]  DR. ARNOLD OLIVARES      ACTIVITY:    There are potential side effects to the medications used for sedation and anesthesia during your procedure.  These include:  Dizziness or light-headedness, confusion or memory loss, delayed reaction times, loss of coordination, nausea and vomiting.  Because of your increased risk for injury, we ask that you observe the following precautions:  For the next 24 hours,  DO NOT operate an automobile, bicycle, motorcycle, , power tools or large equipment of any kind.  Do not drink alcohol, sign any legal documents or make any legal decisions for 24 hours.  Do not bend your head over lower than your heart.  DO sit on the side of bed/couch awhile before getting up.  Plan on bedrest or quiet relaxation today.  You may resume normal activities in 24 hours.    DIET:    Your first meal today should be light, avoiding spicy and fatty foods.  If you tolerate this first meal, then you may advance to your regular diet unless otherwise advised by your physician.    NORMAL SYMPTOMS:  -Mild sore throat if you’ve had an EGD   -Gaseous discomfort    NOTIFY YOUR PHYSICIAN IF THESE SYMPTOMS OCCUR:  1. Fever (greater than 100)  5. Increased abdominal bloating  2. Severe pain    6. Excessive bleeding  3. Nausea and vomiting  7. Chest pain                                                                    4. Chills    8. Shortness of breath    ADDITIONAL INSTRUCTIONS:    Biopsy results: Call [facility] for biopsy results in 1 week         Colon Polyps: Care Instructions  Your Care Instructions     Colon polyps are growths in the colon or the rectum. The cause of most colon polyps is not known, and most people who get them do not have any problems. But a certain kind can turn into cancer. For this reason, regular testing for colon polyps is important for  testing for colon polyps is important for people as they get older. It is also important for anyone who has an increased risk for colon cancer.  Polyps are usually found through routine colon cancer screening tests. Although most colon polyps are not cancerous, they are usually removed and then tested for cancer. Screening for colon cancer saves lives because the cancer can usually be cured if it is caught early.  If you have a polyp that is the type that can turn into cancer, you may need more tests to examine your entire colon. The doctor will remove any other polyps that are found, and you will be tested more often.  Follow-up care is a key part of your treatment and safety. Be sure to make and go to all appointments, and call your doctor if you are having problems. It's also a good idea to know your test results and keep a list of the medicines you take.  How can you care for yourself at home?  Regular exams to look for colon polyps are the best way to prevent polyps from turning into colon cancer. These can include stool tests, sigmoidoscopy, colonoscopy, and CT colonography. Talk with your doctor about a testing schedule that is right for you.  To prevent polyps  There is no home treatment that can prevent colon polyps. But these steps may help lower your risk for cancer.  Stay active. Being active can help you get to and stay at a healthy weight. Try to exercise on most days of the week. Walking is a good choice.  Eat well. Choose a variety of vegetables, fruits, legumes (such as peas and beans), fish, poultry, and whole grains.  Do not smoke. If you need help quitting, talk to your doctor about stop-smoking programs and medicines. These can increase your chances of quitting for good.  If you drink alcohol, limit how much you drink. Limit alcohol to 2 drinks a day for men and 1 drink a day for women.  When should you call for help?   Call your doctor now or seek immediate medical care if:    You have severe

## 2024-05-31 NOTE — PROCEDURES
[facility]  Endoscopy Note    Patient: Fernanda Parham  : 1965  Acct#:     Procedure: Esophagogastroduodenoscopy with biopsy    Date:  2024     Surgeon:  ARNOLD OLIVARES MD      Anesthesia:    IV propofol, per anesthesia       EBL: <50 mL    Indications:  GERD, epigastric abdominal pain     Description of Procedure:    Informed consent was obtained from the patient after explanation of indications, benefits and possible risks and complications of the procedure.      The patient was then taken to the endoscopy suite, placed in the left lateral decubitus position and the above IV sedation was administrered.    The Olympus videoendoscope (GIF-H190) was placed in the patient's mouth and under direct visualization passed into the esophagus.  The scope was then advanced into the stomach and to the second portion of the duodenum.  A retroflexed exam of the gastric cardia and fundus was performed. The scope was then withdrawn back into the stomach, it was decompressed, and the scope was completely withdrawn.    Findings:  Normal first and second portion of the duodenum.  Biopsies were obtained evaluate for celiac disease.  Multiple areas of pinpoint hemorrhage throughout the gastric antrum and body with many areas of hematin consistent with hemorrhagic gastritis.  Biopsies were obtained evaluate for H. pylori.  Small hiatal hernia.  Grade A reflux esophagitis.  Minor irregularity of the GE junction.  Biopsies were obtained from the lower third of the esophagus to evaluate for short segment Hyde's esophagus.       The patient tolerated the procedure well and was taken to the post anesthesia care unit in good condition.    Biopsies: Yes.     Impression:    Normal first and second portion of the duodenum.  Biopsies were obtained evaluate for celiac disease.  Multiple areas of pinpoint hemorrhage throughout the gastric antrum and body with many areas of hematin consistent with hemorrhagic

## 2024-08-01 ENCOUNTER — HOSPITAL ENCOUNTER (OUTPATIENT)
Age: 59
Discharge: HOME | End: 2024-08-01
Payer: COMMERCIAL

## 2024-08-01 LAB
T4 FREE SERPL-MCNC: 1.1 NG/DL (ref 0.9–1.8)
TSH SERPL DL<=0.005 MIU/L-ACNC: 1.1 UIU/ML (ref 0.27–4.2)

## 2024-08-01 PROCEDURE — 84443 ASSAY THYROID STIM HORMONE: CPT

## 2024-08-01 PROCEDURE — 84439 ASSAY OF FREE THYROXINE: CPT

## 2024-08-06 ENCOUNTER — OFFICE VISIT (OUTPATIENT)
Dept: ENDOCRINOLOGY | Age: 59
End: 2024-08-06

## 2024-08-06 VITALS
BODY MASS INDEX: 31.66 KG/M2 | HEART RATE: 59 BPM | WEIGHT: 197 LBS | SYSTOLIC BLOOD PRESSURE: 101 MMHG | HEIGHT: 66 IN | DIASTOLIC BLOOD PRESSURE: 66 MMHG

## 2024-08-06 DIAGNOSIS — E66.09 CLASS 1 OBESITY DUE TO EXCESS CALORIES WITH SERIOUS COMORBIDITY AND BODY MASS INDEX (BMI) OF 31.0 TO 31.9 IN ADULT: ICD-10-CM

## 2024-08-06 DIAGNOSIS — Z13.29 SCREENING FOR THYROID DISORDER: Primary | ICD-10-CM

## 2024-08-06 DIAGNOSIS — R60.9 LIPEDEMA: ICD-10-CM

## 2024-08-06 DIAGNOSIS — Z86.39 H/O VITAMIN B DEFICIENCY: ICD-10-CM

## 2024-08-06 DIAGNOSIS — E55.9 HYPOVITAMINOSIS D: ICD-10-CM

## 2024-08-06 RX ORDER — PHENTERMINE HYDROCHLORIDE 37.5 MG/1
18.75 TABLET ORAL
Qty: 45 TABLET | Refills: 0 | Status: SHIPPED | OUTPATIENT
Start: 2024-08-06 | End: 2024-11-04

## 2024-08-06 NOTE — PROGRESS NOTES
[facility] Endocrinology        Chief Complaint   Patient presents with    Thyroid Problem     Fatigue     Follow-up     Would like to have vit d and b labs drawn        Fernanda Parham is a pleasant 59 y.o. year old female here to establish care for Hashimoto's thyroiditis.  She otherwise has history of B12 deficiency, fatty liver disease, obstructive sleep apnea, major depressive disorder, allergic rhinitis and has a BMI of 31.  She is a prior patient of Dr. Moraes and was last seen by him in July 2023.    In 2018, she had significant vaginal bleeding and required emergent D&C and subsequently underwent hysterectomy and bilateral salpingo-oophorectomy.  Since that time she had noticed unintentional weight gain specially in the midsection.    Patient reports that she had not been feeling well over the past few years.  She had COVID in 2020 and had bilateral pneumonia and reports that she has not felt well since that time.    In 2021, she was diagnosed with sleep apnea and was started on CPAP.  She had noticed improvement in energy level at least initially.    She was diagnosed with infectious mononucleosis in 2022 after being evaluated by Dr. Cloud.  She was also told that she had leaky gut syndrome.  She was diagnosed with vitamin D deficiency and B deficiency.  She had been on and off of supplements.  Recently she restarted vitamin D3 10,000 IU daily along with K2.  She had been evaluated by GI given chronic constipation and bloating.  She is on Linzess with variable response.  She reports bloating.  She had an EGD and colonoscopy completed recently.  She was diagnosed with a hernia and ulcer.  She is on Protonix.    On estradiol supplement.    As far as thyroid, she was diagnosed with Hashimoto's thyroiditis based on elevated TPO antibody.  TSH and free T4 had been in normal range with most recent in August 2024.  She never been on levothyroxine therapy and prefers to avoid that if able to.    She has been

## 2024-09-05 PROBLEM — Z13.29 SCREENING FOR THYROID DISORDER: Status: RESOLVED | Noted: 2024-08-06 | Resolved: 2024-09-05

## 2024-09-10 LAB
CHOLEST SERPL-MCNC: 158 MG/DL (ref 0–199)
GLUCOSE SERPL-MCNC: 93 MG/DL (ref 70–99)
HDLC SERPL-MCNC: 34 MG/DL (ref 40–60)
LDLC SERPL CALC-MCNC: 109 MG/DL
TRIGL SERPL-MCNC: 76 MG/DL (ref 0–150)

## 2024-09-20 ENCOUNTER — TELEPHONE (OUTPATIENT)
Dept: ENDOCRINOLOGY | Age: 59
End: 2024-09-20

## 2024-09-20 NOTE — TELEPHONE ENCOUNTER
Pt called back to reschedule her 11/26/24 appt and the next available in [place] was not until Jan 22, 2025. Pt  states she is takung the Phentermine did you need her to come in sooner because she is taking that medication? She states she has lost 20lbs so far and some inches    # [phone]

## 2024-10-25 NOTE — TELEPHONE ENCOUNTER
Lvm to call back and see if pt is avail either Dec 3rd or Dec 4th @ 2pm since we can double book now at that time slot

## 2024-10-25 NOTE — TELEPHONE ENCOUNTER
Pt asking if she can just keep her appointment on 1-22-25  she has or if there is something later on 12-3 or 12-4 pt asking for a call back

## 2024-11-19 ENCOUNTER — PATIENT MESSAGE (OUTPATIENT)
Dept: ENDOCRINOLOGY | Age: 59
End: 2024-11-19

## 2024-11-19 ENCOUNTER — PATIENT MESSAGE (OUTPATIENT)
Dept: FAMILY MEDICINE CLINIC | Age: 59
End: 2024-11-19

## 2024-11-19 DIAGNOSIS — E66.09 CLASS 1 OBESITY DUE TO EXCESS CALORIES WITH SERIOUS COMORBIDITY AND BODY MASS INDEX (BMI) OF 31.0 TO 31.9 IN ADULT: Primary | ICD-10-CM

## 2024-11-19 DIAGNOSIS — E66.811 CLASS 1 OBESITY DUE TO EXCESS CALORIES WITH SERIOUS COMORBIDITY AND BODY MASS INDEX (BMI) OF 31.0 TO 31.9 IN ADULT: Primary | ICD-10-CM

## 2024-11-20 DIAGNOSIS — E66.811 CLASS 1 OBESITY DUE TO EXCESS CALORIES WITH SERIOUS COMORBIDITY AND BODY MASS INDEX (BMI) OF 31.0 TO 31.9 IN ADULT: ICD-10-CM

## 2024-11-20 DIAGNOSIS — E66.09 CLASS 1 OBESITY DUE TO EXCESS CALORIES WITH SERIOUS COMORBIDITY AND BODY MASS INDEX (BMI) OF 31.0 TO 31.9 IN ADULT: ICD-10-CM

## 2024-11-20 RX ORDER — ESCITALOPRAM OXALATE 10 MG/1
10 TABLET ORAL DAILY
Qty: 90 TABLET | Refills: 1 | Status: SHIPPED | OUTPATIENT
Start: 2024-11-20

## 2024-11-20 RX ORDER — PHENTERMINE HYDROCHLORIDE 37.5 MG/1
18.75 TABLET ORAL
Qty: 45 TABLET | Refills: 0 | Status: SHIPPED | OUTPATIENT
Start: 2024-11-20 | End: 2025-02-18

## 2024-11-20 RX ORDER — PHENTERMINE HYDROCHLORIDE 37.5 MG/1
TABLET ORAL
Qty: 15 TABLET | Refills: 0 | Status: SHIPPED | OUTPATIENT
Start: 2024-11-20 | End: 2024-12-25

## 2024-11-20 NOTE — TELEPHONE ENCOUNTER
Please refill phentermine for patient.     Last OV 08/06/2024  Next OV 01/22/2025    Her appt was cancelled because of Dr Young  went out of town

## 2025-01-22 ENCOUNTER — OFFICE VISIT (OUTPATIENT)
Dept: ENDOCRINOLOGY | Age: 60
End: 2025-01-22
Payer: COMMERCIAL

## 2025-01-22 VITALS
HEART RATE: 74 BPM | DIASTOLIC BLOOD PRESSURE: 64 MMHG | BODY MASS INDEX: 28.61 KG/M2 | WEIGHT: 178 LBS | SYSTOLIC BLOOD PRESSURE: 114 MMHG | HEIGHT: 66 IN

## 2025-01-22 DIAGNOSIS — Z13.29 SCREENING FOR THYROID DISORDER: ICD-10-CM

## 2025-01-22 DIAGNOSIS — E66.811 CLASS 1 OBESITY DUE TO EXCESS CALORIES WITH SERIOUS COMORBIDITY AND BODY MASS INDEX (BMI) OF 31.0 TO 31.9 IN ADULT: ICD-10-CM

## 2025-01-22 DIAGNOSIS — E66.09 CLASS 1 OBESITY DUE TO EXCESS CALORIES WITH SERIOUS COMORBIDITY AND BODY MASS INDEX (BMI) OF 31.0 TO 31.9 IN ADULT: ICD-10-CM

## 2025-01-22 DIAGNOSIS — E55.9 HYPOVITAMINOSIS D: ICD-10-CM

## 2025-01-22 DIAGNOSIS — Z86.39 H/O VITAMIN B DEFICIENCY: ICD-10-CM

## 2025-01-22 PROCEDURE — 99214 OFFICE O/P EST MOD 30 MIN: CPT | Performed by: INTERNAL MEDICINE

## 2025-01-22 RX ORDER — PHENTERMINE HYDROCHLORIDE 37.5 MG/1
18.75 TABLET ORAL
Qty: 45 TABLET | Refills: 2 | Status: SHIPPED | OUTPATIENT
Start: 2025-01-22 | End: 2025-04-22

## 2025-01-22 NOTE — PROGRESS NOTES
been watching diet by cutting down on carbs/sugar and exercising regularly.  Has been able to lose 20+ pounds since starting phentermine.  No adverse effects.  Continue current regimen.  Refilled phentermine.        -     phentermine (ADIPEX-P) 37.5 MG tablet; Take 0.5 tablets by mouth every morning (before breakfast) for 90 days. Max Daily Amount: 18.75 mg, Disp-45 tablet, R-0Normal  -     Hemoglobin A1C; Future  3. Hypovitaminosis D, continue current vitamin D3 10,000 IU daily.  Update level  -     Vitamin D 25 Hydroxy; Future  4. H/O vitamin B deficiency  -     Vitamin B12; Future  -     Vitamin B1; Future  -     VITAMIN B2; Future  -     Vitamin B6; Future  5. Lipedema, stage I. I advised to review material on [email or web address]        Follow-up scheduled for May 2025      Electronically signed by Afsaneh Young MD on 1/22/2025 at 3:49 PM    Thank you very much for allowing me to take care of this patient along with you.    Comment: This documentation utilized a software-based speech recognition technology (voice-to-text process), where occasional errors in transcription can occur.    Time spent during visit: 40 mins

## 2025-01-23 LAB
25(OH)D3 SERPL-MCNC: 50.4 NG/ML
ALBUMIN SERPL-MCNC: 4.4 G/DL (ref 3.4–5)
ALBUMIN/GLOB SERPL: 2.1 {RATIO} (ref 1.1–2.2)
ALP SERPL-CCNC: 84 U/L (ref 40–129)
ALT SERPL-CCNC: 16 U/L (ref 10–40)
ANION GAP SERPL CALCULATED.3IONS-SCNC: 9 MMOL/L (ref 3–16)
AST SERPL-CCNC: 16 U/L (ref 15–37)
BILIRUB SERPL-MCNC: 0.6 MG/DL (ref 0–1)
BUN SERPL-MCNC: 12 MG/DL (ref 7–20)
CALCIUM SERPL-MCNC: 10.5 MG/DL (ref 8.3–10.6)
CHLORIDE SERPL-SCNC: 105 MMOL/L (ref 99–110)
CO2 SERPL-SCNC: 27 MMOL/L (ref 21–32)
CREAT SERPL-MCNC: 0.8 MG/DL (ref 0.6–1.1)
EST. AVERAGE GLUCOSE BLD GHB EST-MCNC: 93.9 MG/DL
GFR SERPLBLD CREATININE-BSD FMLA CKD-EPI: 86 ML/MIN/{1.73_M2}
GLUCOSE SERPL-MCNC: 112 MG/DL (ref 70–99)
HBA1C MFR BLD: 4.9 %
POTASSIUM SERPL-SCNC: 4.3 MMOL/L (ref 3.5–5.1)
PROT SERPL-MCNC: 6.5 G/DL (ref 6.4–8.2)
SODIUM SERPL-SCNC: 141 MMOL/L (ref 136–145)
T4 FREE SERPL-MCNC: 1 NG/DL (ref 0.9–1.8)
TSH SERPL DL<=0.005 MIU/L-ACNC: 1.13 UIU/ML (ref 0.27–4.2)
VIT B12 SERPL-MCNC: 293 PG/ML (ref 211–911)

## 2025-01-26 LAB
VIT B1 SERPL-MCNC: 20 NMOL/L (ref 4–15)
VIT B6 SERPL-MCNC: 41 NMOL/L (ref 20–125)

## 2025-01-27 LAB — VIT B2 SERPL-SCNC: 8 NMOL/L (ref 5–50)

## 2025-01-31 DIAGNOSIS — F51.01 PRIMARY INSOMNIA: ICD-10-CM

## 2025-01-31 RX ORDER — TRAZODONE HYDROCHLORIDE 50 MG/1
50 TABLET ORAL NIGHTLY PRN
Qty: 90 TABLET | Refills: 1 | OUTPATIENT
Start: 2025-01-31

## 2025-03-05 ENCOUNTER — HOSPITAL ENCOUNTER (OUTPATIENT)
Dept: WOMENS IMAGING | Age: 60
Discharge: HOME | End: 2025-03-05
Payer: COMMERCIAL

## 2025-03-05 VITALS — HEIGHT: 66 IN | WEIGHT: 170 LBS | BODY MASS INDEX: 27.32 KG/M2

## 2025-03-05 DIAGNOSIS — Z12.31 SCREENING MAMMOGRAM FOR BREAST CANCER: ICD-10-CM

## 2025-03-05 PROCEDURE — 77063 BREAST TOMOSYNTHESIS BI: CPT

## 2025-03-06 ENCOUNTER — TELEPHONE (OUTPATIENT)
Dept: WOMENS IMAGING | Age: 60
End: 2025-03-06

## 2025-03-06 DIAGNOSIS — R92.8 ABNORMAL MAMMOGRAM OF BOTH BREASTS: Primary | ICD-10-CM

## 2025-03-06 DIAGNOSIS — R92.1 CALCIFICATION OF RIGHT BREAST ON MAMMOGRAPHY: ICD-10-CM

## 2025-03-06 DIAGNOSIS — N63.20 MASS OF LEFT BREAST, UNSPECIFIED QUADRANT: ICD-10-CM

## 2025-03-06 NOTE — TELEPHONE ENCOUNTER
Reviewed screening mammogram results with patient.  Answered questions regarding results. Patient verbalized understanding. Patient has been scheduled for follow-up breast imaging appointment.    MARGOTH Maloney, CN-BM  Patient Navigator  [facility]  [phone]

## 2025-03-07 ENCOUNTER — HOSPITAL ENCOUNTER (OUTPATIENT)
Dept: WOMENS IMAGING | Age: 60
Discharge: HOME | End: 2025-03-07
Payer: COMMERCIAL

## 2025-03-07 DIAGNOSIS — N63.20 MASS OF LEFT BREAST, UNSPECIFIED QUADRANT: ICD-10-CM

## 2025-03-07 DIAGNOSIS — R92.8 ABNORMAL MAMMOGRAM OF BOTH BREASTS: ICD-10-CM

## 2025-03-07 PROCEDURE — 76642 ULTRASOUND BREAST LIMITED: CPT

## 2025-03-07 PROCEDURE — 77065 DX MAMMO INCL CAD UNI: CPT

## 2025-04-25 ENCOUNTER — OFFICE VISIT (OUTPATIENT)
Dept: OBGYN CLINIC | Age: 60
End: 2025-04-25

## 2025-04-25 VITALS
OXYGEN SATURATION: 98 % | SYSTOLIC BLOOD PRESSURE: 110 MMHG | HEART RATE: 61 BPM | BODY MASS INDEX: 28.73 KG/M2 | WEIGHT: 178 LBS | DIASTOLIC BLOOD PRESSURE: 80 MMHG

## 2025-04-25 DIAGNOSIS — Z01.419 WELL WOMAN EXAM WITH ROUTINE GYNECOLOGICAL EXAM: Primary | ICD-10-CM

## 2025-04-25 RX ORDER — ESTRADIOL 1 MG/1
1 TABLET ORAL DAILY
Qty: 90 TABLET | Refills: 4 | Status: SHIPPED | OUTPATIENT
Start: 2025-04-25

## 2025-04-25 SDOH — ECONOMIC STABILITY: FOOD INSECURITY: WITHIN THE PAST 12 MONTHS, THE FOOD YOU BOUGHT JUST DIDN'T LAST AND YOU DIDN'T HAVE MONEY TO GET MORE.: NEVER TRUE

## 2025-04-25 SDOH — ECONOMIC STABILITY: FOOD INSECURITY: WITHIN THE PAST 12 MONTHS, YOU WORRIED THAT YOUR FOOD WOULD RUN OUT BEFORE YOU GOT MONEY TO BUY MORE.: NEVER TRUE

## 2025-04-25 ASSESSMENT — ENCOUNTER SYMPTOMS
ABDOMINAL PAIN: 0
SHORTNESS OF BREATH: 0
COUGH: 0
DIARRHEA: 0
NAUSEA: 0
CONSTIPATION: 0
VOMITING: 0

## 2025-04-25 ASSESSMENT — PATIENT HEALTH QUESTIONNAIRE - PHQ9
7. TROUBLE CONCENTRATING ON THINGS, SUCH AS READING THE NEWSPAPER OR WATCHING TELEVISION: NOT AT ALL
SUM OF ALL RESPONSES TO PHQ QUESTIONS 1-9: 0
6. FEELING BAD ABOUT YOURSELF - OR THAT YOU ARE A FAILURE OR HAVE LET YOURSELF OR YOUR FAMILY DOWN: NOT AT ALL
SUM OF ALL RESPONSES TO PHQ QUESTIONS 1-9: 0
10. IF YOU CHECKED OFF ANY PROBLEMS, HOW DIFFICULT HAVE THESE PROBLEMS MADE IT FOR YOU TO DO YOUR WORK, TAKE CARE OF THINGS AT HOME, OR GET ALONG WITH OTHER PEOPLE: NOT DIFFICULT AT ALL
SUM OF ALL RESPONSES TO PHQ QUESTIONS 1-9: 0
4. FEELING TIRED OR HAVING LITTLE ENERGY: NOT AT ALL
1. LITTLE INTEREST OR PLEASURE IN DOING THINGS: NOT AT ALL
5. POOR APPETITE OR OVEREATING: NOT AT ALL
9. THOUGHTS THAT YOU WOULD BE BETTER OFF DEAD, OR OF HURTING YOURSELF: NOT AT ALL
SUM OF ALL RESPONSES TO PHQ QUESTIONS 1-9: 0
3. TROUBLE FALLING OR STAYING ASLEEP: NOT AT ALL
8. MOVING OR SPEAKING SO SLOWLY THAT OTHER PEOPLE COULD HAVE NOTICED. OR THE OPPOSITE, BEING SO FIGETY OR RESTLESS THAT YOU HAVE BEEN MOVING AROUND A LOT MORE THAN USUAL: NOT AT ALL
2. FEELING DOWN, DEPRESSED OR HOPELESS: NOT AT ALL

## 2025-04-25 ASSESSMENT — ANXIETY QUESTIONNAIRES
6. BECOMING EASILY ANNOYED OR IRRITABLE: NOT AT ALL
1. FEELING NERVOUS, ANXIOUS, OR ON EDGE: NOT AT ALL
5. BEING SO RESTLESS THAT IT IS HARD TO SIT STILL: NOT AT ALL
GAD7 TOTAL SCORE: 0
2. NOT BEING ABLE TO STOP OR CONTROL WORRYING: NOT AT ALL
IF YOU CHECKED OFF ANY PROBLEMS ON THIS QUESTIONNAIRE, HOW DIFFICULT HAVE THESE PROBLEMS MADE IT FOR YOU TO DO YOUR WORK, TAKE CARE OF THINGS AT HOME, OR GET ALONG WITH OTHER PEOPLE: NOT DIFFICULT AT ALL
3. WORRYING TOO MUCH ABOUT DIFFERENT THINGS: NOT AT ALL
7. FEELING AFRAID AS IF SOMETHING AWFUL MIGHT HAPPEN: NOT AT ALL
4. TROUBLE RELAXING: NOT AT ALL

## 2025-04-25 NOTE — PROGRESS NOTES
[facility] Ob/Gyn   Annual Exam      CC:   Chief Complaint   Patient presents with    Annual Exam     Pt here for a pap  Last mammogram 3/7/25 with US   Last she think January or February last yr       HPI:  59 y.o.  presents for her gynecologic annual exam.    Health Maintenance:  Sexually Active: yes   Sexual Issues: no   Contraception: n/a    Screening:  Last pap smear: n/a hysterectomy  History of abnormal pap smears: no  STI hx: no  Mammogram: recent  Colonoscopy:   DEXA scan: n/a    Review of Systems:   Review of Systems   Constitutional:  Negative for activity change and fatigue.   Respiratory:  Negative for cough and shortness of breath.    Cardiovascular:  Negative for chest pain.   Gastrointestinal:  Negative for abdominal pain, constipation, diarrhea, nausea and vomiting.   Genitourinary:  Negative for difficulty urinating, dyspareunia, dysuria, menstrual problem, pelvic pain, vaginal bleeding, vaginal discharge and vaginal pain.   Musculoskeletal:  Negative for joint swelling and myalgias.   Allergic/Immunologic: Negative for environmental allergies.   Neurological:  Negative for dizziness and headaches.        Primary Care Physician: René Colvin, APRN - CNP    Obstetric History  OB History    Para Term  AB Living   4 4 4      SAB IAB Ectopic Molar Multiple Live Births        4      # Outcome Date GA Lbr Castillo/2nd Weight Sex Type Anes PTL Lv   4 Term            3 Term            2 Term            1 Term                Gynecologic History  Menstrual History:  hysterectomy  Post Menopausal Bleeding: n/a     Medical History:  Past Medical History:   Diagnosis Date    Class 1 obesity in adult 10/29/2021    COVID-19 2020    Difficult intubation     states she's been told twice this.    Endometriosis     Fatty infiltration of liver 10/29/2021    Fibroid tumor     Goiter 2023    Hashimoto's disease     Major depressive disorder, recurrent, mild 2023    Nausea &

## 2025-05-15 ENCOUNTER — OFFICE VISIT (OUTPATIENT)
Dept: FAMILY MEDICINE CLINIC | Age: 60
End: 2025-05-15
Payer: COMMERCIAL

## 2025-05-15 VITALS
SYSTOLIC BLOOD PRESSURE: 104 MMHG | HEIGHT: 66 IN | DIASTOLIC BLOOD PRESSURE: 60 MMHG | BODY MASS INDEX: 28.61 KG/M2 | WEIGHT: 178 LBS

## 2025-05-15 DIAGNOSIS — G47.33 OSA (OBSTRUCTIVE SLEEP APNEA): ICD-10-CM

## 2025-05-15 DIAGNOSIS — F51.01 PRIMARY INSOMNIA: Primary | ICD-10-CM

## 2025-05-15 DIAGNOSIS — F33.1 MAJOR DEPRESSIVE DISORDER, RECURRENT, MODERATE (HCC): ICD-10-CM

## 2025-05-15 PROCEDURE — G2211 COMPLEX E/M VISIT ADD ON: HCPCS

## 2025-05-15 PROCEDURE — 99213 OFFICE O/P EST LOW 20 MIN: CPT

## 2025-05-15 RX ORDER — TRAZODONE HYDROCHLORIDE 50 MG/1
50 TABLET ORAL NIGHTLY PRN
Qty: 90 TABLET | Refills: 0 | Status: SHIPPED | OUTPATIENT
Start: 2025-05-15

## 2025-05-15 RX ORDER — ESCITALOPRAM OXALATE 10 MG/1
10 TABLET ORAL DAILY
Qty: 90 TABLET | Refills: 1 | Status: CANCELLED | OUTPATIENT
Start: 2025-05-15

## 2025-05-15 ASSESSMENT — ENCOUNTER SYMPTOMS
RESPIRATORY NEGATIVE: 1
GASTROINTESTINAL NEGATIVE: 1

## 2025-05-15 NOTE — PROGRESS NOTES
[facility] care visit   5/15/2025    Fernanda Parham (:  1965) is a 59 y.o. female, here for routine follow up and sleep.    No chief complaint on file.       ASSESSMENT/ PLAN  1. Primary insomnia  -Stable on trazodone.  Okay to continue.  We did discuss other measures to improve sleep such as avoidance caffeine, methylated B vitamins, beam dream sleep powder and magnesium  - traZODone (DESYREL) 50 MG tablet; Take 1 tablet by mouth nightly as needed for Sleep  Dispense: 90 tablet; Refill: 0    2. Major depressive disorder, recurrent, moderate (HCC)  -Stable on Lexapro.  Continue    3. RADHA (obstructive sleep apnea)  -Compliant with CPAP      Imaging:    US THYROID 2022    FINDINGS:   Right thyroid lobe:  4.9 x 1.7 x 1.5 cm       Left thyroid lobe:  5.3 x 1.7 x 1.6 cm       Isthmus:  0.6 cm       Thyroid Gland:  Thyroid gland demonstrates diffuse heterogenous echotexture   of the thyroid gland.       Nodules: No discrete sonographically measurable focal thyroid nodule seen.       Cervical lymphadenopathy: No abnormal lymph nodes in the imaged portions of   the neck.           Impression   Nonspecific heterogenous echotexture of the thyroid gland, which may reflect   underlying conditions diffusely affecting thyroid gland such as Graves   disease or Hashimoto's thyroiditis.  Correlation with thyroid function tests   is recommended.         MAMMO 3/2/2022    Impression   No mammographic evidence of malignancy.       BIRADS:   BIRADS - CATEGORY 2       Benign Findings. Normal interval follow-up is recommended in 12 months.       OVERALL ASSESSMENT - BENIGN       A letter of notification will be sent to the patient regarding the results.       The [employer] recommends annual mammograms for women 40   years and older.           MRI ABDOMEN 11/3/2021    Organs: Previous identified 9 mm segment 3 lesion is seen to represent a   cyst.  Gallbladder is surgically

## (undated) DEVICE — TRAP SPEC RETRV CLR PLAS POLYP IN LN SUCT QUIK CTCH

## (undated) DEVICE — FORCEPS BX L240CM JAW DIA2.4MM ORNG L CAP W/ NDL DISP RAD

## (undated) DEVICE — SNARE COLD DIAMOND 15MM THIN

## (undated) DEVICE — CANNULA SAMP CO2 AD GRN 7FT CO2 AND 7FT O2 TBNG UNIV CONN

## (undated) DEVICE — SNARE COLD DIAMOND 10MM THIN

## (undated) DEVICE — SINGLE-USE POLYPECTOMY SNARE: Brand: CAPTIVATOR